# Patient Record
Sex: MALE | Race: WHITE | NOT HISPANIC OR LATINO | Employment: FULL TIME | URBAN - METROPOLITAN AREA
[De-identification: names, ages, dates, MRNs, and addresses within clinical notes are randomized per-mention and may not be internally consistent; named-entity substitution may affect disease eponyms.]

---

## 2017-04-01 ENCOUNTER — APPOINTMENT (OUTPATIENT)
Dept: LAB | Facility: CLINIC | Age: 52
End: 2017-04-01
Payer: COMMERCIAL

## 2017-04-01 DIAGNOSIS — R79.89 OTHER SPECIFIED ABNORMAL FINDINGS OF BLOOD CHEMISTRY: ICD-10-CM

## 2017-04-01 DIAGNOSIS — I51.9 HEART DISEASE: ICD-10-CM

## 2017-04-01 LAB
ANION GAP SERPL CALCULATED.3IONS-SCNC: 7 MMOL/L (ref 4–13)
BUN SERPL-MCNC: 16 MG/DL (ref 5–25)
CALCIUM SERPL-MCNC: 8.8 MG/DL (ref 8.3–10.1)
CHLORIDE SERPL-SCNC: 105 MMOL/L (ref 100–108)
CO2 SERPL-SCNC: 29 MMOL/L (ref 21–32)
CREAT SERPL-MCNC: 1.44 MG/DL (ref 0.6–1.3)
GFR SERPL CREATININE-BSD FRML MDRD: 51.5 ML/MIN/1.73SQ M
GLUCOSE P FAST SERPL-MCNC: 128 MG/DL (ref 65–99)
POTASSIUM SERPL-SCNC: 3.6 MMOL/L (ref 3.5–5.3)
SODIUM SERPL-SCNC: 141 MMOL/L (ref 136–145)

## 2017-04-01 PROCEDURE — 36415 COLL VENOUS BLD VENIPUNCTURE: CPT

## 2017-04-01 PROCEDURE — 80048 BASIC METABOLIC PNL TOTAL CA: CPT

## 2017-04-02 ENCOUNTER — GENERIC CONVERSION - ENCOUNTER (OUTPATIENT)
Dept: OTHER | Facility: OTHER | Age: 52
End: 2017-04-02

## 2017-04-02 DIAGNOSIS — I10 ESSENTIAL (PRIMARY) HYPERTENSION: ICD-10-CM

## 2017-04-02 DIAGNOSIS — Z12.5 ENCOUNTER FOR SCREENING FOR MALIGNANT NEOPLASM OF PROSTATE: ICD-10-CM

## 2017-04-02 DIAGNOSIS — R79.89 OTHER SPECIFIED ABNORMAL FINDINGS OF BLOOD CHEMISTRY: ICD-10-CM

## 2017-04-02 DIAGNOSIS — I51.9 HEART DISEASE: ICD-10-CM

## 2017-04-02 DIAGNOSIS — R53.83 OTHER FATIGUE: ICD-10-CM

## 2017-04-02 DIAGNOSIS — F41.9 ANXIETY DISORDER: ICD-10-CM

## 2017-04-14 ENCOUNTER — APPOINTMENT (OUTPATIENT)
Dept: LAB | Facility: CLINIC | Age: 52
End: 2017-04-14
Payer: COMMERCIAL

## 2017-04-14 DIAGNOSIS — Z12.5 ENCOUNTER FOR SCREENING FOR MALIGNANT NEOPLASM OF PROSTATE: ICD-10-CM

## 2017-04-14 DIAGNOSIS — I51.9 HEART DISEASE: ICD-10-CM

## 2017-04-14 DIAGNOSIS — R53.83 OTHER FATIGUE: ICD-10-CM

## 2017-04-14 DIAGNOSIS — R79.89 OTHER SPECIFIED ABNORMAL FINDINGS OF BLOOD CHEMISTRY: ICD-10-CM

## 2017-04-14 DIAGNOSIS — F41.9 ANXIETY DISORDER: ICD-10-CM

## 2017-04-14 LAB
ALBUMIN SERPL BCP-MCNC: 3.8 G/DL (ref 3.5–5)
ALP SERPL-CCNC: 59 U/L (ref 46–116)
ALT SERPL W P-5'-P-CCNC: 28 U/L (ref 12–78)
ANION GAP SERPL CALCULATED.3IONS-SCNC: 9 MMOL/L (ref 4–13)
AST SERPL W P-5'-P-CCNC: 15 U/L (ref 5–45)
BILIRUB SERPL-MCNC: 0.54 MG/DL (ref 0.2–1)
BUN SERPL-MCNC: 13 MG/DL (ref 5–25)
CALCIUM SERPL-MCNC: 9.2 MG/DL (ref 8.3–10.1)
CHLORIDE SERPL-SCNC: 103 MMOL/L (ref 100–108)
CHOLEST SERPL-MCNC: 155 MG/DL (ref 50–200)
CO2 SERPL-SCNC: 26 MMOL/L (ref 21–32)
CREAT SERPL-MCNC: 1.32 MG/DL (ref 0.6–1.3)
ERYTHROCYTE [DISTWIDTH] IN BLOOD BY AUTOMATED COUNT: 12.9 % (ref 11.6–15.1)
GFR SERPL CREATININE-BSD FRML MDRD: 57 ML/MIN/1.73SQ M
GLUCOSE SERPL-MCNC: 115 MG/DL (ref 65–140)
HCT VFR BLD AUTO: 44.6 % (ref 36.5–49.3)
HDLC SERPL-MCNC: 39 MG/DL (ref 40–60)
HGB BLD-MCNC: 15.7 G/DL (ref 12–17)
LDLC SERPL CALC-MCNC: 75 MG/DL (ref 0–100)
MCH RBC QN AUTO: 30 PG (ref 26.8–34.3)
MCHC RBC AUTO-ENTMCNC: 35.2 G/DL (ref 31.4–37.4)
MCV RBC AUTO: 85 FL (ref 82–98)
PLATELET # BLD AUTO: 217 THOUSANDS/UL (ref 149–390)
PMV BLD AUTO: 10.5 FL (ref 8.9–12.7)
POTASSIUM SERPL-SCNC: 3.8 MMOL/L (ref 3.5–5.3)
PROT SERPL-MCNC: 7.1 G/DL (ref 6.4–8.2)
PSA SERPL-MCNC: 0.6 NG/ML (ref 0–4)
RBC # BLD AUTO: 5.24 MILLION/UL (ref 3.88–5.62)
SODIUM SERPL-SCNC: 138 MMOL/L (ref 136–145)
TRIGL SERPL-MCNC: 206 MG/DL
TSH SERPL DL<=0.05 MIU/L-ACNC: 0.69 UIU/ML (ref 0.36–3.74)
WBC # BLD AUTO: 5.11 THOUSAND/UL (ref 4.31–10.16)

## 2017-04-14 PROCEDURE — 80061 LIPID PANEL: CPT

## 2017-04-14 PROCEDURE — 80053 COMPREHEN METABOLIC PANEL: CPT

## 2017-04-14 PROCEDURE — G0103 PSA SCREENING: HCPCS

## 2017-04-14 PROCEDURE — 84443 ASSAY THYROID STIM HORMONE: CPT

## 2017-04-14 PROCEDURE — 85027 COMPLETE CBC AUTOMATED: CPT

## 2017-04-14 PROCEDURE — 36415 COLL VENOUS BLD VENIPUNCTURE: CPT

## 2017-05-01 ENCOUNTER — ALLSCRIPTS OFFICE VISIT (OUTPATIENT)
Dept: OTHER | Facility: OTHER | Age: 52
End: 2017-05-01

## 2017-05-15 ENCOUNTER — ALLSCRIPTS OFFICE VISIT (OUTPATIENT)
Dept: OTHER | Facility: OTHER | Age: 52
End: 2017-05-15

## 2017-05-22 DIAGNOSIS — R79.89 OTHER SPECIFIED ABNORMAL FINDINGS OF BLOOD CHEMISTRY: ICD-10-CM

## 2017-05-22 DIAGNOSIS — I10 ESSENTIAL (PRIMARY) HYPERTENSION: ICD-10-CM

## 2017-09-11 ENCOUNTER — GENERIC CONVERSION - ENCOUNTER (OUTPATIENT)
Dept: OTHER | Facility: OTHER | Age: 52
End: 2017-09-11

## 2017-10-06 ENCOUNTER — APPOINTMENT (OUTPATIENT)
Dept: LAB | Facility: CLINIC | Age: 52
End: 2017-10-06
Payer: COMMERCIAL

## 2017-10-06 DIAGNOSIS — Z00.00 ENCOUNTER FOR GENERAL ADULT MEDICAL EXAMINATION WITHOUT ABNORMAL FINDINGS: ICD-10-CM

## 2017-10-06 DIAGNOSIS — I10 ESSENTIAL (PRIMARY) HYPERTENSION: ICD-10-CM

## 2017-10-06 DIAGNOSIS — E78.1 PURE HYPERGLYCERIDEMIA: ICD-10-CM

## 2017-10-06 LAB
ALBUMIN SERPL BCP-MCNC: 3.8 G/DL (ref 3.5–5)
ALP SERPL-CCNC: 58 U/L (ref 46–116)
ALT SERPL W P-5'-P-CCNC: 25 U/L (ref 12–78)
ANION GAP SERPL CALCULATED.3IONS-SCNC: 8 MMOL/L (ref 4–13)
AST SERPL W P-5'-P-CCNC: 14 U/L (ref 5–45)
BILIRUB SERPL-MCNC: 0.36 MG/DL (ref 0.2–1)
BUN SERPL-MCNC: 14 MG/DL (ref 5–25)
CALCIUM SERPL-MCNC: 8.5 MG/DL (ref 8.3–10.1)
CHLORIDE SERPL-SCNC: 106 MMOL/L (ref 100–108)
CHOLEST SERPL-MCNC: 134 MG/DL (ref 50–200)
CO2 SERPL-SCNC: 26 MMOL/L (ref 21–32)
CREAT SERPL-MCNC: 1.31 MG/DL (ref 0.6–1.3)
GFR SERPL CREATININE-BSD FRML MDRD: 62 ML/MIN/1.73SQ M
GLUCOSE P FAST SERPL-MCNC: 95 MG/DL (ref 65–99)
HDLC SERPL-MCNC: 38 MG/DL (ref 40–60)
LDLC SERPL CALC-MCNC: 71 MG/DL (ref 0–100)
POTASSIUM SERPL-SCNC: 3.5 MMOL/L (ref 3.5–5.3)
PROT SERPL-MCNC: 7.3 G/DL (ref 6.4–8.2)
SODIUM SERPL-SCNC: 140 MMOL/L (ref 136–145)
TRIGL SERPL-MCNC: 123 MG/DL

## 2017-10-06 PROCEDURE — 36415 COLL VENOUS BLD VENIPUNCTURE: CPT

## 2017-10-06 PROCEDURE — 80061 LIPID PANEL: CPT

## 2017-10-06 PROCEDURE — 80053 COMPREHEN METABOLIC PANEL: CPT

## 2017-10-12 DIAGNOSIS — Z00.00 ENCOUNTER FOR GENERAL ADULT MEDICAL EXAMINATION WITHOUT ABNORMAL FINDINGS: ICD-10-CM

## 2017-10-12 DIAGNOSIS — I10 ESSENTIAL (PRIMARY) HYPERTENSION: ICD-10-CM

## 2017-10-12 DIAGNOSIS — E78.1 PURE HYPERGLYCERIDEMIA: ICD-10-CM

## 2017-12-15 ENCOUNTER — ALLSCRIPTS OFFICE VISIT (OUTPATIENT)
Dept: OTHER | Facility: OTHER | Age: 52
End: 2017-12-15

## 2017-12-16 NOTE — PROGRESS NOTES
Assessment  1  Benign essential hypertension (401 1) (I10)   2  Generalized anxiety disorder (300 02) (F41 1)   3  Acid reflux disease (530 81) (K21 9)    Plan  Acid reflux disease    · Pantoprazole Sodium 40 MG Oral Tablet Delayed Release; TAKE 1 TABLET 30MINUTES BEFORE BREAKFAST DAILY PRN : acid reflux  Benign essential hypertension    · Bystolic 5 MG Oral Tablet; take 1 tablet every day   · EKG/ECG- POC; Status:Complete;   Done: [id number] 12:03PM    Discussion/Summary    Patient presents for re-evaluation of hypertension, generalized anxiety disorder and symptoms of acid reflux  EKG performed in the office today is normal  Blood pressure is elevated  He appears bit anxious and stressed out  Will continue on amlodipine 5 mg once a day and add Bystolic 5 mg daily  I advised patient to follow bland diet  Will try him on pantoprazole for symptoms of acid reflux  He may use medication on a as needed basis  I advised patient to monitor blood pressures at home  He will contact me in a few weeks with an update  The patient was counseled regarding instructions for management,-- impressions  Chief Complaint  Patient is here c/o elevated blood pressure and acid reflux x's 1 day  All medications were reviewed and updated with the patient  History of Present Illness  HPI: acid reflux- burning in chest all day Wednesday - through Thursday - improved nowPt used TUMsB/P was high yesterday,  systolic / yesterday - B/P has improved after resting SBP of 155 felt hot yesterdayface was red  yesterday B/P this am at home 155/89Patient denies chest pain, palpitations or dizziness  He admits being under lot of stress within past few weeksHe uses Lexapro daily along with Norvasc 5 mg once a dayHe tolerates current medication regimen well  We tried him on metoprolol in the past and cause significant fatigue and sexual side effects  Patient admits recurrent acid reflux symptoms      Review of Systems   Constitutional: no fever or chills, feels well, no tiredness, no recent weight loss or weight gain  ENT: no complaints of earache, no loss of hearing, no nosebleeds or nasal discharge, no sore throat or hoarseness  Cardiovascular: no complaints of slow or fast heart rate, no chest pain, no palpitations, no leg claudication or lower extremity edema  Respiratory: no complaints of shortness of breath, no wheezing or cough, no dyspnea on exertion, no orthopnea or PND  Gastrointestinal: as noted in HPI  Genitourinary: no complaints of dysuria or incontinence, no hesitancy, no nocturia, no genital lesion, no inadequacy of penile erection  Musculoskeletal: no complaints of arthralgia, no myalgia, no joint swelling or stiffness, no limb pain or swelling  Integumentary: no complaints of skin rash or lesion, no itching or dry skin, no skin wounds  Neurological: no complaints of headache, no confusion, no numbness or tingling, no dizziness or fainting  Other Symptoms: Anxiety, stress, emotional problems  Active Problems  1  Abnormal TSH (790 6) (R94 6)   2  Allergic rhinitis (477 9) (J30 9)   3  Anxiety (300 00) (F41 9)   4  Asthma (493 90) (J45 909)   5  Benign essential hypertension (401 1) (I10)   6  Chest pain (786 50) (R07 9)   7  Diastolic dysfunction (604 2) (I51 9)   8  Elevated serum creatinine (790 99) (R79 89)   9  Encounter for prostate cancer screening (V76 44) (Z12 5)   10  Fatigue (780 79) (R53 83)   11  Generalized anxiety disorder (300 02) (F41 1)   12  Hypertriglyceridemia (272 1) (E78 1)   13  Insomnia (780 52) (G47 00)   14  Need for influenza vaccination (V04 81) (Z23)   15  Palpitations (785 1) (R00 2)    Past Medical History  Active Problems And Past Medical History Reviewed: The active problems and past medical history were reviewed and updated today  Family History  Father    1  Family history of Diabetes Mellitus (V18 0)   2  Family history of Hypertension (V17 49)  Sister    3   Family history of Breast Cancer (V16 3)  Family History Reviewed: The family history was reviewed and updated today  Social History   · Never A Smoker  The social history was reviewed and updated today  Surgical History  1  History of Surgery Vas Deferens Vasectomy  Surgical History Reviewed: The surgical history was reviewed and updated today  Current Meds   1  AmLODIPine Besylate 5 MG Oral Tablet; Take 1 tablet daily; Therapy: [id number] to (Evaluate:33Bvk1326)  Requested for: 47YAA5093; Last Rx:[id number] Ordered   2  Escitalopram Oxalate 20 MG Oral Tablet; take 1 tablet by mouth every day; Therapy: 05MTG6263 to (Evaluate:13Oct2018)  Requested for: 63YAJ8634; Last Rx:[id number] Ordered    The medication list was reviewed and updated today  Allergies  1  No Known Drug Allergies    Vitals   Recorded: 06PRP2780 11:57AM Recorded: 98XED9128 11:43AM   Temperature  98 2 F   Heart Rate  64   Respiration  16   Systolic 841 707   Diastolic 88 72   Height  5 ft 8 in   Weight  214 lb 4 oz   BMI Calculated  32 58   BSA Calculated  2 1     Physical Exam   Constitutional  General appearance: No acute distress, well appearing and well nourished  Pulmonary  Respiratory effort: No increased work of breathing or signs of respiratory distress  Auscultation of lungs: Clear to auscultation, equal breath sounds bilaterally, no wheezes, no rales, no rhonci  Cardiovascular  Auscultation of heart: Normal rate and rhythm, normal S1 and S2, without murmurs  Examination of extremities for edema and/or varicosities: Normal    Carotid pulses: Normal    Abdomen  Abdomen: Non-tender, no masses  -- Mild epigastric tenderness, no rebound no guarding; no abdominal bruit  Liver and spleen: No hepatomegaly or splenomegaly  Musculoskeletal  Gait and station: Normal    Neurologic  Cranial nerves: Cranial nerves 2-12 intact     Psychiatric  Orientation to person, place and time: Normal    Mood and affect: Normal          Results/Data  EKG/ECG- [facility] [id number] 12:03PM Nicole Tristan     Test Name Result Flag Reference   EKG/ECG 12/15/2017       A 12 lead ECG was performed and was normal -- No acute ischemia  Rhythm and rate: normal sinus rhythm  Signatures   Electronically signed by :  BLU Cabrera ; Dec 15 2017  7:32PM EST                       (Author)

## 2018-01-14 VITALS
WEIGHT: 212.44 LBS | HEART RATE: 62 BPM | HEIGHT: 68 IN | SYSTOLIC BLOOD PRESSURE: 140 MMHG | DIASTOLIC BLOOD PRESSURE: 100 MMHG | BODY MASS INDEX: 32.2 KG/M2

## 2018-01-14 VITALS
HEART RATE: 86 BPM | SYSTOLIC BLOOD PRESSURE: 148 MMHG | DIASTOLIC BLOOD PRESSURE: 80 MMHG | BODY MASS INDEX: 32.15 KG/M2 | HEIGHT: 68 IN | WEIGHT: 212.13 LBS | TEMPERATURE: 98.3 F

## 2018-01-14 NOTE — RESULT NOTES
Message   Please contact patient  I received his blood work  His kidney function is still decreased  At this time I advised him to discontinue Cozaar ( Losartan)  Please make sure that he is drinking plenty of fluids  He needs repeat complete panel blood work in 1-2 weeks and schedule checkup with me then  Verified Results  (1) BASIC METABOLIC PROFILE [id number] 10:07AM Kristy Banuelos Order Number: [id number]     Test Name Result Flag Reference   SODIUM 141 mmol/L  136-145   POTASSIUM 3 6 mmol/L  3 5-5 3   CHLORIDE 105 mmol/L  100-108   CARBON DIOXIDE 29 mmol/L  21-32   ANION GAP (CALC) 7 mmol/L  4-13   BLOOD UREA NITROGEN 16 mg/dL  5-25   CREATININE 1 44 mg/dL H 0 60-1 30   Standardized to IDMS reference method   CALCIUM 8 8 mg/dL  8 3-10 1   eGFR Non-African American 51 5 ml/min/1 73sq m     National Kidney Disease Education Program recommendations are as follows:  GFR calculation is accurate only with a steady state creatinine  Chronic Kidney disease less than 60 ml/min/1 73 sq  meters  Kidney failure less than 15 ml/min/1 73 sq  meters  GLUCOSE FASTING 128 mg/dL H 65-99       Plan  Diastolic dysfunction, Fatigue, Palpitations    · Losartan Potassium 25 MG Oral Tablet (Cozaar)    Signatures   Electronically signed by :  BLU Milan ; Apr 2 2017  2:01PM EST                       (Author)

## 2018-01-16 NOTE — RESULT NOTES
Verified Results  (1) CBC/PLT/DIFF [id number] 12:27PM Beatriz Prader     Test Name Result Flag Reference   WHITE BLOOD CELL COUNT 4 1 Thousand/uL  3 8-10 8   RED BLOOD CELL COUNT 5 08 Million/uL  4 20-5 80   HEMOGLOBIN 15 1 g/dL  13 2-17 1   HEMATOCRIT 44 4 %  38 5-50 0   MCV 87 3 fL  80 0-100 0   MCH 29 7 pg  27 0-33 0   MCHC 34 0 g/dL  32 0-36 0   RDW 13 3 %  11 0-15 0   PLATELET COUNT 224 Thousand/uL  140-400   MPV 8 4 fL  7 5-11 5   ABSOLUTE NEUTROPHILS 2530 cells/uL  8178-4688   ABSOLUTE LYMPHOCYTES 951 cells/uL  850-3900   ABSOLUTE MONOCYTES 390 cells/uL  200-950   ABSOLUTE EOSINOPHILS 201 cells/uL     ABSOLUTE BASOPHILS 29 cells/uL  0-200   NEUTROPHILS 61 7 %     LYMPHOCYTES 23 2 %     MONOCYTES 9 5 %     EOSINOPHILS 4 9 %     BASOPHILS 0 7 %       (1) COMPREHENSIVE METABOLIC PANEL [id number] 48:29XP Melony Prader     Test Name Result Flag Reference   GLUCOSE 106 mg/dL H 65-99   Fasting reference interval   UREA NITROGEN (BUN) 15 mg/dL  7-25   CREATININE 1 32 mg/dL  0 70-1 33   For patients >52years of age, the reference limit  for Creatinine is approximately 13% higher for people  identified as -American  eGFR NON-AFR   AMERICAN 62 mL/min/1 73m2  > OR = 60   eGFR AFRICAN AMERICAN 72 mL/min/1 73m2  > OR = 60   BUN/CREATININE RATIO   3-69   NOT APPLICABLE (calc)   SODIUM 139 mmol/L  135-146   POTASSIUM 3 8 mmol/L  3 5-5 3   CHLORIDE 101 mmol/L     CARBON DIOXIDE 28 mmol/L  19-30   CALCIUM 8 9 mg/dL  8 6-10 3   PROTEIN, TOTAL 6 8 g/dL  6 1-8 1   ALBUMIN 4 5 g/dL  3 6-5 1   GLOBULIN 2 3 g/dL (calc)  1 9-3 7   ALBUMIN/GLOBULIN RATIO 2 0 (calc)  1 0-2 5   BILIRUBIN, TOTAL 0 6 mg/dL  0 2-1 2   ALKALINE PHOSPHATASE 44 U/L     AST 18 U/L  10-35   ALT 22 U/L  9-46     (Q) LIPID PANEL WITH REFLEX TO DIRECT LDL [id number] 12:27PM Melony Prader     Test Name Result Flag Reference   CHOLESTEROL, TOTAL 181 mg/dL  125-200   HDL CHOLESTEROL 40 mg/dL  > OR = 40   TRIGLICERIDES 438 mg/dL H <150   LDL-CHOLESTEROL 111 mg/dL (calc)  <130   Desirable range <100 mg/dL for patients with CHD or  diabetes and <70 mg/dL for diabetic patients with  known heart disease  CHOL/HDLC RATIO 4 5 (calc)  < OR = 5 0   NON HDL CHOLESTEROL 141 mg/dL (calc)     Target for non-HDL cholesterol is 30 mg/dL higher than   LDL cholesterol target  (1) PSA, DIAGNOSTIC (FOLLOW-UP) [id number] 12:27PM Dakota Lester     Test Name Result Flag Reference   PSA, TOTAL 0 6 ng/mL  < OR = 4 0   This test was performed using the Siemens  chemiluminescent method  Values obtained from  different assay methods cannot be used  interchangeably  PSA levels, regardless of  value, should not be interpreted as absolute  evidence of the presence or absence of disease  (Q) TSH, 3RD GENERATION W/REFLEX TO FT4 [id number] 12:27PM Dakota Lester   REPORT COMMENT:  FASTING:YES  AN UPDATE OR CORRECTION HAS BEEN MADE TO      Test Name Result Flag Reference   TSH W/REFLEX TO FT4 0 51 mIU/L  0 40-4 50       Discussion/Summary   Dear Denise Chicas,      All blood work is normal    If you have any questions or concerns please contact our office  Thank you      Signatures   Electronically signed by :  BLU Gonzalez ; Deo 10 2016  2:51PM EST                       (Author)

## 2018-01-18 ENCOUNTER — GENERIC CONVERSION - ENCOUNTER (OUTPATIENT)
Dept: OTHER | Facility: OTHER | Age: 53
End: 2018-01-18

## 2018-01-22 VITALS
WEIGHT: 309.25 LBS | BODY MASS INDEX: 46.87 KG/M2 | SYSTOLIC BLOOD PRESSURE: 130 MMHG | HEIGHT: 68 IN | DIASTOLIC BLOOD PRESSURE: 80 MMHG | RESPIRATION RATE: 16 BRPM | TEMPERATURE: 97.6 F | HEART RATE: 64 BPM

## 2018-01-23 VITALS
WEIGHT: 214.25 LBS | HEIGHT: 68 IN | HEART RATE: 64 BPM | SYSTOLIC BLOOD PRESSURE: 142 MMHG | RESPIRATION RATE: 16 BRPM | DIASTOLIC BLOOD PRESSURE: 88 MMHG | TEMPERATURE: 98.2 F | BODY MASS INDEX: 32.47 KG/M2

## 2018-01-23 NOTE — MISCELLANEOUS
Message   Recorded as Task   Date: 01/18/2018 09:38 AM, Created By: Des Edward   Task Name: Medical Complaint Callback   Assigned To: Ingrid Stevens   Regarding Patient: Yamila Uribe, Status: Active   Comment:    Darcy Reyez - 18 Jan 2018 9:38 AM     TASK CREATED    FYI:  Spoke with pt, he is out of town  He is reporting that his abdomen is extremely bloated and his lower extremities are edematous--he is unable to define his ankles  I advised him it was imperative to proceed to the nearest [facility] for evaluation ASAP  I spelled each of his medications he was taking for him to write down to take the Urgent Care and advised him if the [address] [place] [address] physician required and further information to contact our office--explained that there is always an on call physician here to speak with  Ingrid Stevens - 18 Jan 2018 12:50 PM     TASK REPLIED TO: Previously Assigned To Ingrid Stevens               agree, thank you        Signatures   Electronically signed by :  BLU Caballero ; Jan 18 2018 12:50PM EST                       (Author)

## 2018-01-29 RX ORDER — PANTOPRAZOLE SODIUM 40 MG/1
40 TABLET, DELAYED RELEASE ORAL DAILY
COMMUNITY
Start: 2017-12-15 | End: 2018-03-10 | Stop reason: SDUPTHER

## 2018-01-29 RX ORDER — ESCITALOPRAM OXALATE 20 MG/1
1 TABLET ORAL DAILY
COMMUNITY
Start: 2012-07-24 | End: 2018-09-08 | Stop reason: SDUPTHER

## 2018-01-29 RX ORDER — AMLODIPINE BESYLATE 5 MG/1
1 TABLET ORAL DAILY
COMMUNITY
Start: 2017-09-11 | End: 2018-04-03 | Stop reason: SDUPTHER

## 2018-01-29 RX ORDER — NEBIVOLOL 5 MG/1
1 TABLET ORAL DAILY
COMMUNITY
Start: 2017-12-15 | End: 2018-02-05 | Stop reason: SDUPTHER

## 2018-01-30 ENCOUNTER — OFFICE VISIT (OUTPATIENT)
Dept: FAMILY MEDICINE CLINIC | Facility: CLINIC | Age: 53
End: 2018-01-30
Payer: COMMERCIAL

## 2018-01-30 VITALS
DIASTOLIC BLOOD PRESSURE: 90 MMHG | SYSTOLIC BLOOD PRESSURE: 144 MMHG | HEART RATE: 84 BPM | WEIGHT: 220 LBS | TEMPERATURE: 97.8 F | BODY MASS INDEX: 33.34 KG/M2 | HEIGHT: 68 IN

## 2018-01-30 DIAGNOSIS — I10 BENIGN ESSENTIAL HTN: Primary | ICD-10-CM

## 2018-01-30 PROBLEM — K21.9 ACID REFLUX DISEASE: Status: ACTIVE | Noted: 2017-12-15

## 2018-01-30 PROCEDURE — 99213 OFFICE O/P EST LOW 20 MIN: CPT | Performed by: FAMILY MEDICINE

## 2018-01-30 NOTE — PROGRESS NOTES
FAMILY PRACTICE OFFICE VISIT       NAME: Dick Daley  AGE: 47 y o  SEX: male       : 1965        MRN: [id number]    DATE: 2018  TIME: 9:01 AM    Assessment and Plan     Problem List Items Addressed This Visit     Benign essential HTN - Primary    Relevant Medications    nebivolol (BYSTOLIC) 5 mg tablet    amLODIPine (NORVASC) 5 mg tablet        Patient presents for follow-up  He was evaluated at [place] urgent care [facility] after developing transient leg edema, could be related to travel and use of amlodipine  He has not been taking Bystolic for the last 2 weeks  His blood pressure is elevated today and I advised him to restart his beta-blocker as directed and update me with his blood pressures in 2 weeks  Will schedule follow-up in 1 month  He will continue on regimen of Protonix and Lexapro for chronic medical conditions otherwise  There are no Patient Instructions on file for this visit  Chief Complaint     Chief Complaint   Patient presents with    Follow-up     bilateral leg swelling approx 2 weeks ago  Pt was seen at the hospital in [facility]  History of Present Illness     Patient presents for follow-up  He was traveling in [place] 2 weeks ago and has developed leg swelling  Patient was evaluated at urgent care center and was evaluated  His blood pressure was 144/84 EKG performed at urgent care center revealed no acute ischemic changes with a heart rate of 60 beats per minute  Patient self discontinued Bystolic since he was concerned that this new medication is contributing to his leg swelling  His leg swelling has resolved within few days, he is not on any diuretics  He denies any symptoms of chest pain, palpitations, dyspnea or leg edema at present time  He remains on regimen of amlodipine and Protonix  Acid reflux symptoms are well controlled  He has not restarted Bystolic so far  Review of Systems   Review of Systems   Constitutional: Negative  HENT: Negative  Respiratory: Negative  Cardiovascular: Negative  Gastrointestinal: Negative  Musculoskeletal: Negative  Neurological: Negative  Psychiatric/Behavioral: Negative  Active Problem List     Patient Active Problem List   Diagnosis    Acid reflux disease    Asthma    Benign essential HTN       Past Medical History:  No past medical history on file  Past Surgical History:  Past Surgical History:   Procedure Laterality Date    VASECTOMY      Surgery Vas Deferens Vasectomy       Family History:  Family History   Problem Relation Age of Onset    Diabetes Father      Diabetes Mellitus    Hypertension Father     Breast cancer Sister        Social History:  Social History     Social History    Marital status: /Civil Union     Spouse name: N/A    Number of children: N/A    Years of education: N/A     Occupational History    Not on file  Social History Main Topics    Smoking status: Never Smoker    Smokeless tobacco: Never Used    Alcohol use Not on file      Comment: social    Drug use: Unknown    Sexual activity: Not on file     Other Topics Concern    Not on file     Social History Narrative    No narrative on file       I have reviewed the patient's medical history in detail; there are no changes to the history as noted in the electronic medical record  Objective     Vitals:    01/30/18 0855   BP: 144/90   Pulse:    Temp:      Vitals:    01/30/18 0807 01/30/18 0855   BP: 158/94 144/90   Pulse: 84    Temp: 97 8 °F (36 6 °C)    Weight: 99 8 kg (220 lb)    Height: 5' 8" (1 727 m)      Wt Readings from Last 3 Encounters:   01/30/18 99 8 kg (220 lb)   12/15/17 97 2 kg (214 lb 4 oz)   09/11/17 (!) 140 kg (309 lb 4 oz)       Physical Exam   Constitutional: He is oriented to person, place, and time  He appears well-developed and well-nourished  HENT:   Head: Normocephalic and atraumatic  Neck: Neck supple     Cardiovascular: Normal rate, regular rhythm and normal heart sounds  No murmur heard  No leg edema   Pulmonary/Chest: Effort normal and breath sounds normal    Musculoskeletal: Normal range of motion  Neurological: He is alert and oriented to person, place, and time  Psychiatric: He has a normal mood and affect  His behavior is normal  Thought content normal    Nursing note and vitals reviewed        Pertinent Laboratory/Diagnostic Studies:  Lab Results   Component Value Date    GLUCOSE 115 04/14/2017    BUN 14 10/06/2017    CREATININE 1 31 (H) 10/06/2017    CALCIUM 8 5 10/06/2017     10/06/2017    K 3 5 10/06/2017    CO2 26 10/06/2017     10/06/2017     Lab Results   Component Value Date    ALT 25 10/06/2017    AST 14 10/06/2017    ALKPHOS 58 10/06/2017    BILITOT 0 36 10/06/2017       Lab Results   Component Value Date    WBC 5 11 04/14/2017    HGB 15 7 04/14/2017    HCT 44 6 04/14/2017    MCV 85 04/14/2017     04/14/2017       No results found for: TSH    Lab Results   Component Value Date    CHOL 134 10/06/2017     Lab Results   Component Value Date    TRIG 123 10/06/2017     Lab Results   Component Value Date    HDL 38 (L) 10/06/2017     Lab Results   Component Value Date    LDLCALC 71 10/06/2017     No results found for: HGBA1C    Results for orders placed or performed in visit on 10/06/17   Comprehensive metabolic panel   Result Value Ref Range    Sodium 140 136 - 145 mmol/L    Potassium 3 5 3 5 - 5 3 mmol/L    Chloride 106 100 - 108 mmol/L    CO2 26 21 - 32 mmol/L    Anion Gap 8 4 - 13 mmol/L    BUN 14 5 - 25 mg/dL    Creatinine 1 31 (H) 0 60 - 1 30 mg/dL    Glucose, Fasting 95 65 - 99 mg/dL    Calcium 8 5 8 3 - 10 1 mg/dL    AST 14 5 - 45 U/L    ALT 25 12 - 78 U/L    Alkaline Phosphatase 58 46 - 116 U/L    Total Protein 7 3 6 4 - 8 2 g/dL    Albumin 3 8 3 5 - 5 0 g/dL    Total Bilirubin 0 36 0 20 - 1 00 mg/dL    eGFR 62 ml/min/1 73sq m   Lipid Panel with Direct LDL reflex   Result Value Ref Range    Cholesterol 134 50 - 200 mg/dL    Triglycerides 123 <=150 mg/dL    HDL, Direct 38 (L) 40 - 60 mg/dL    LDL Calculated 71 0 - 100 mg/dL       No orders of the defined types were placed in this encounter  ALLERGIES:  Allergies no known allergies    Current Medications     Current Outpatient Prescriptions   Medication Sig Dispense Refill    amLODIPine (NORVASC) 5 mg tablet Take 1 tablet by mouth daily      escitalopram (LEXAPRO) 20 mg tablet Take 1 tablet by mouth daily      pantoprazole (PROTONIX) 40 mg tablet Take 40 mg by mouth daily      nebivolol (BYSTOLIC) 5 mg tablet Take 1 tablet by mouth daily       No current facility-administered medications for this visit  Health Maintenance   There are no preventive care reminders to display for this patient    Immunization History   Administered Date(s) Administered    Influenza Quadrivalent Preservative Free 3 years and older IM 11/27/2015    Influenza TIV (IM) 11/29/2005       Rasheeda Wong MD

## 2018-01-30 NOTE — PROGRESS NOTES
FAMILY PRACTICE OFFICE VISIT       NAME: Uche Armetna  AGE: 47 y o  SEX: male       : 1965        MRN: [id number]    DATE: 2018  TIME: 8:56 AM    Assessment and Plan     Problem List Items Addressed This Visit     None            There are no Patient Instructions on file for this visit  Chief Complaint     Chief Complaint   Patient presents with    Follow-up     bilateral leg swelling approx 2 weeks ago  Pt was seen at the hospital in [facility]  History of Present Illness     HPI    Review of Systems   Review of Systems    Active Problem List   There is no problem list on file for this patient  Past Medical History:  No past medical history on file  Past Surgical History:  Past Surgical History:   Procedure Laterality Date    VASECTOMY      Surgery Vas Deferens Vasectomy       Family History:  Family History   Problem Relation Age of Onset    Diabetes Father      Diabetes Mellitus    Hypertension Father     Breast cancer Sister        Social History:  Social History     Social History    Marital status: /Civil Union     Spouse name: N/A    Number of children: N/A    Years of education: N/A     Occupational History    Not on file  Social History Main Topics    Smoking status: Never Smoker    Smokeless tobacco: Never Used    Alcohol use Not on file      Comment: social    Drug use: Unknown    Sexual activity: Not on file     Other Topics Concern    Not on file     Social History Narrative    No narrative on file       I have reviewed the patient's medical history in detail; there are no changes to the history as noted in the electronic medical record      Objective     Vitals:    18 0855   BP: 144/90   Pulse:    Temp:      Wt Readings from Last 3 Encounters:   18 99 8 kg (220 lb)   12/15/17 97 2 kg (214 lb 4 oz)   17 (!) 140 kg (309 lb 4 oz)       Physical Exam    Pertinent Laboratory/Diagnostic Studies:  Lab Results   Component Value Date    GLUCOSE 115 04/14/2017    BUN 14 10/06/2017    CREATININE 1 31 (H) 10/06/2017    CALCIUM 8 5 10/06/2017     10/06/2017    K 3 5 10/06/2017    CO2 26 10/06/2017     10/06/2017     Lab Results   Component Value Date    ALT 25 10/06/2017    AST 14 10/06/2017    ALKPHOS 58 10/06/2017    BILITOT 0 36 10/06/2017       Lab Results   Component Value Date    WBC 5 11 04/14/2017    HGB 15 7 04/14/2017    HCT 44 6 04/14/2017    MCV 85 04/14/2017     04/14/2017       No results found for: TSH    Lab Results   Component Value Date    CHOL 134 10/06/2017     Lab Results   Component Value Date    TRIG 123 10/06/2017     Lab Results   Component Value Date    HDL 38 (L) 10/06/2017     Lab Results   Component Value Date    LDLCALC 71 10/06/2017     No results found for: HGBA1C    Results for orders placed or performed in visit on 10/06/17   Comprehensive metabolic panel   Result Value Ref Range    Sodium 140 136 - 145 mmol/L    Potassium 3 5 3 5 - 5 3 mmol/L    Chloride 106 100 - 108 mmol/L    CO2 26 21 - 32 mmol/L    Anion Gap 8 4 - 13 mmol/L    BUN 14 5 - 25 mg/dL    Creatinine 1 31 (H) 0 60 - 1 30 mg/dL    Glucose, Fasting 95 65 - 99 mg/dL    Calcium 8 5 8 3 - 10 1 mg/dL    AST 14 5 - 45 U/L    ALT 25 12 - 78 U/L    Alkaline Phosphatase 58 46 - 116 U/L    Total Protein 7 3 6 4 - 8 2 g/dL    Albumin 3 8 3 5 - 5 0 g/dL    Total Bilirubin 0 36 0 20 - 1 00 mg/dL    eGFR 62 ml/min/1 73sq m   Lipid Panel with Direct LDL reflex   Result Value Ref Range    Cholesterol 134 50 - 200 mg/dL    Triglycerides 123 <=150 mg/dL    HDL, Direct 38 (L) 40 - 60 mg/dL    LDL Calculated 71 0 - 100 mg/dL       No orders of the defined types were placed in this encounter        ALLERGIES:  Allergies no known allergies    Current Medications     Current Outpatient Prescriptions   Medication Sig Dispense Refill    amLODIPine (NORVASC) 5 mg tablet Take 1 tablet by mouth daily      escitalopram (LEXAPRO) 20 mg tablet Take 1 tablet by mouth daily      pantoprazole (PROTONIX) 40 mg tablet Take 40 mg by mouth daily      nebivolol (BYSTOLIC) 5 mg tablet Take 1 tablet by mouth daily       No current facility-administered medications for this visit  Health Maintenance   There are no preventive care reminders to display for this patient    Immunization History   Administered Date(s) Administered    Influenza Quadrivalent Preservative Free 3 years and older IM 11/27/2015    Influenza TIV (IM) 11/29/2005       Hernán Rod MD

## 2018-02-05 ENCOUNTER — TELEPHONE (OUTPATIENT)
Dept: FAMILY MEDICINE CLINIC | Facility: CLINIC | Age: 53
End: 2018-02-05

## 2018-02-05 DIAGNOSIS — I10 ESSENTIAL HYPERTENSION: Primary | ICD-10-CM

## 2018-02-05 RX ORDER — NEBIVOLOL 5 MG/1
5 TABLET ORAL DAILY
Qty: 90 TABLET | Refills: 1 | Status: SHIPPED | OUTPATIENT
Start: 2018-02-05 | End: 2018-07-25 | Stop reason: SDUPTHER

## 2018-02-05 NOTE — TELEPHONE ENCOUNTER
Patient called to advise he is almost out of the Bostolic samples & would like to know if you can call in a script to [facility] in [place] for him

## 2018-02-19 ENCOUNTER — OFFICE VISIT (OUTPATIENT)
Dept: FAMILY MEDICINE CLINIC | Facility: CLINIC | Age: 53
End: 2018-02-19
Payer: COMMERCIAL

## 2018-02-19 VITALS
HEIGHT: 68 IN | SYSTOLIC BLOOD PRESSURE: 118 MMHG | BODY MASS INDEX: 33.01 KG/M2 | DIASTOLIC BLOOD PRESSURE: 80 MMHG | TEMPERATURE: 98.1 F | RESPIRATION RATE: 16 BRPM | HEART RATE: 64 BPM | WEIGHT: 217.8 LBS

## 2018-02-19 DIAGNOSIS — I10 BENIGN ESSENTIAL HTN: Primary | ICD-10-CM

## 2018-02-19 DIAGNOSIS — Z12.5 ENCOUNTER FOR PROSTATE CANCER SCREENING: ICD-10-CM

## 2018-02-19 PROCEDURE — 99212 OFFICE O/P EST SF 10 MIN: CPT | Performed by: FAMILY MEDICINE

## 2018-02-19 NOTE — PROGRESS NOTES
FAMILY PRACTICE OFFICE VISIT       NAME: Laverne Benton  AGE: 47 y o  SEX: male       : 1965        MRN: [id number]    DATE: 2018  TIME: 7:37 PM    Assessment and Plan     Problem List Items Addressed This Visit     Benign essential HTN - Primary        Patient presents for follow-up  Hypertension-well controlled, continue same medications including Norvasc and Bystolic  Patient feels well and offers no complaints  He is motivated to start with exercise routine and lose weight  Will repeat blood work in 3 months and schedule annual wellness exam then  There are no Patient Instructions on file for this visit  Chief Complaint     Chief Complaint   Patient presents with    Follow-up       History of Present Illness     Patient presents for follow-up of hypertension  He remains on regimen of Norvasc and Bystolic  He tolerates both medications well without side effects  No recurrences of leg edema  He uses Lexapro and Protonix as directed as well  His motivated to start healthy diet and exercise routine  He offers no complaints today        Review of Systems   Review of Systems   All other systems reviewed and are negative  Active Problem List     Patient Active Problem List   Diagnosis    Acid reflux disease    Asthma    Benign essential HTN       Past Medical History:  History reviewed  No pertinent past medical history  Past Surgical History:  Past Surgical History:   Procedure Laterality Date    VASECTOMY      Surgery Vas Deferens Vasectomy       Family History:  Family History   Problem Relation Age of Onset    Diabetes Father      Diabetes Mellitus    Hypertension Father     Breast cancer Sister        Social History:  Social History     Social History    Marital status: /Civil Union     Spouse name: N/A    Number of children: N/A    Years of education: N/A     Occupational History    Not on file       Social History Main Topics    Smoking status: Never Smoker    Smokeless tobacco: Never Used    Alcohol use Not on file      Comment: social    Drug use: Unknown    Sexual activity: Not on file     Other Topics Concern    Not on file     Social History Narrative    No narrative on file       Objective     Vitals:    02/19/18 1107   BP: 118/80   Pulse: 64   Resp: 16   Temp: 98 1 °F (36 7 °C)     Wt Readings from Last 3 Encounters:   02/19/18 98 8 kg (217 lb 12 8 oz)   01/30/18 99 8 kg (220 lb)   12/15/17 97 2 kg (214 lb 4 oz)       Physical Exam   Constitutional: He appears well-developed and well-nourished  Neck: Neck supple  Cardiovascular: Normal rate, regular rhythm and normal heart sounds  Neurological: He is alert  Psychiatric: He has a normal mood and affect  Nursing note and vitals reviewed        Pertinent Laboratory/Diagnostic Studies:  Lab Results   Component Value Date    GLUCOSE 115 04/14/2017    BUN 14 10/06/2017    CREATININE 1 31 (H) 10/06/2017    CALCIUM 8 5 10/06/2017     10/06/2017    K 3 5 10/06/2017    CO2 26 10/06/2017     10/06/2017     Lab Results   Component Value Date    ALT 25 10/06/2017    AST 14 10/06/2017    ALKPHOS 58 10/06/2017    BILITOT 0 36 10/06/2017       Lab Results   Component Value Date    WBC 5 11 04/14/2017    HGB 15 7 04/14/2017    HCT 44 6 04/14/2017    MCV 85 04/14/2017     04/14/2017       No results found for: TSH    Lab Results   Component Value Date    CHOL 134 10/06/2017     Lab Results   Component Value Date    TRIG 123 10/06/2017     Lab Results   Component Value Date    HDL 38 (L) 10/06/2017     Lab Results   Component Value Date    LDLCALC 71 10/06/2017     No results found for: HGBA1C    Results for orders placed or performed in visit on 10/06/17   Comprehensive metabolic panel   Result Value Ref Range    Sodium 140 136 - 145 mmol/L    Potassium 3 5 3 5 - 5 3 mmol/L    Chloride 106 100 - 108 mmol/L    CO2 26 21 - 32 mmol/L    Anion Gap 8 4 - 13 mmol/L    BUN 14 5 - 25 mg/dL Creatinine 1 31 (H) 0 60 - 1 30 mg/dL    Glucose, Fasting 95 65 - 99 mg/dL    Calcium 8 5 8 3 - 10 1 mg/dL    AST 14 5 - 45 U/L    ALT 25 12 - 78 U/L    Alkaline Phosphatase 58 46 - 116 U/L    Total Protein 7 3 6 4 - 8 2 g/dL    Albumin 3 8 3 5 - 5 0 g/dL    Total Bilirubin 0 36 0 20 - 1 00 mg/dL    eGFR 62 ml/min/1 73sq m   Lipid Panel with Direct LDL reflex   Result Value Ref Range    Cholesterol 134 50 - 200 mg/dL    Triglycerides 123 <=150 mg/dL    HDL, Direct 38 (L) 40 - 60 mg/dL    LDL Calculated 71 0 - 100 mg/dL       No orders of the defined types were placed in this encounter  ALLERGIES:  No Known Allergies    Current Medications     Current Outpatient Prescriptions   Medication Sig Dispense Refill    amLODIPine (NORVASC) 5 mg tablet Take 1 tablet by mouth daily      escitalopram (LEXAPRO) 20 mg tablet Take 1 tablet by mouth daily      nebivolol (BYSTOLIC) 5 mg tablet Take 1 tablet (5 mg total) by mouth daily 90 tablet 1    pantoprazole (PROTONIX) 40 mg tablet Take 40 mg by mouth daily       No current facility-administered medications for this visit            Health Maintenance     Health Maintenance   Topic Date Due    HIV SCREENING  1965    Hepatitis C Screening  1965    PNEUMOCOCCAL POLYSACCHARIDE VACCINE AGE 2-64 HIGH RISK  07/05/1966    DTaP,Tdap,and Td Vaccines (1 - Tdap) 07/05/1971    INFLUENZA VACCINE  09/01/2017    Depression Screening PHQ-9  01/30/2019     Immunization History   Administered Date(s) Administered    Influenza Quadrivalent Preservative Free 3 years and older IM 11/27/2015    Influenza TIV (IM) 11/29/2005       Justus Vogel MD

## 2018-03-10 DIAGNOSIS — K21.9 CHRONIC GERD: Primary | ICD-10-CM

## 2018-03-10 RX ORDER — PANTOPRAZOLE SODIUM 40 MG/1
TABLET, DELAYED RELEASE ORAL
Qty: 30 TABLET | Refills: 5 | Status: SHIPPED | OUTPATIENT
Start: 2018-03-10 | End: 2018-09-08 | Stop reason: SDUPTHER

## 2018-04-03 DIAGNOSIS — I10 ESSENTIAL HYPERTENSION: Primary | ICD-10-CM

## 2018-04-04 RX ORDER — AMLODIPINE BESYLATE 5 MG/1
TABLET ORAL
Qty: 30 TABLET | Refills: 3 | Status: SHIPPED | OUTPATIENT
Start: 2018-04-04 | End: 2018-04-26 | Stop reason: SDUPTHER

## 2018-04-26 DIAGNOSIS — I10 ESSENTIAL HYPERTENSION: ICD-10-CM

## 2018-04-26 RX ORDER — AMLODIPINE BESYLATE 5 MG/1
TABLET ORAL
Qty: 30 TABLET | Refills: 3 | Status: SHIPPED | OUTPATIENT
Start: 2018-04-26 | End: 2018-07-27 | Stop reason: SDUPTHER

## 2018-07-24 ENCOUNTER — TELEPHONE (OUTPATIENT)
Dept: FAMILY MEDICINE CLINIC | Facility: CLINIC | Age: 53
End: 2018-07-24

## 2018-07-25 DIAGNOSIS — I10 ESSENTIAL HYPERTENSION: ICD-10-CM

## 2018-07-26 RX ORDER — NEBIVOLOL 5 MG/1
5 TABLET ORAL DAILY
Qty: 90 TABLET | Refills: 0 | Status: SHIPPED | OUTPATIENT
Start: 2018-07-26 | End: 2018-09-08

## 2018-07-26 NOTE — TELEPHONE ENCOUNTER
Please contact patient  I refilled his Bystolic  I have not seen him in the office since January  He was supposed to proceed with blood work and schedule follow-up  Please remind him to do so    Thank you

## 2018-07-27 ENCOUNTER — TELEPHONE (OUTPATIENT)
Dept: FAMILY MEDICINE CLINIC | Facility: CLINIC | Age: 53
End: 2018-07-27

## 2018-07-27 DIAGNOSIS — I10 ESSENTIAL HYPERTENSION: ICD-10-CM

## 2018-07-27 RX ORDER — AMLODIPINE BESYLATE 5 MG/1
5 TABLET ORAL DAILY
Qty: 90 TABLET | Refills: 0 | Status: SHIPPED | OUTPATIENT
Start: 2018-07-27 | End: 2018-09-08 | Stop reason: SDUPTHER

## 2018-07-27 NOTE — TELEPHONE ENCOUNTER
Bystolic is not covered with pts new [employer]  PA is required and started:,  Ref# [id number]  Pt has not been taking this or the Amlodipine for his BP  He did state he was taking Cherry Extract and losing weight and has not s/s of HTN  He does have an appt scheduled with you 8/22/18  I did request the refill of the Amlodipine but he does not have any Bystolic

## 2018-08-03 NOTE — TELEPHONE ENCOUNTER
PA for Bystolic denied  Pt must have documentation of trial and failure of at least five of the formulary alternatives:  1  Acebutolol  2  Atenolol  3  Betaxolol  4  Bisoprolol  5  Carvedilol  6  Labetolol  7  Metoprolol Succinate  8  Metoprolol Tartrate  9  Nadolol  10  Propranolol  11  Timolol    Please change to one of the formulary alternatives  Please call pt with outcome

## 2018-09-08 ENCOUNTER — OFFICE VISIT (OUTPATIENT)
Dept: FAMILY MEDICINE CLINIC | Facility: CLINIC | Age: 53
End: 2018-09-08
Payer: COMMERCIAL

## 2018-09-08 ENCOUNTER — APPOINTMENT (OUTPATIENT)
Dept: LAB | Facility: CLINIC | Age: 53
End: 2018-09-08
Payer: COMMERCIAL

## 2018-09-08 VITALS
TEMPERATURE: 97.7 F | WEIGHT: 218.6 LBS | HEART RATE: 66 BPM | RESPIRATION RATE: 16 BRPM | DIASTOLIC BLOOD PRESSURE: 78 MMHG | HEIGHT: 68 IN | SYSTOLIC BLOOD PRESSURE: 122 MMHG | BODY MASS INDEX: 33.13 KG/M2

## 2018-09-08 DIAGNOSIS — Z12.5 ENCOUNTER FOR PROSTATE CANCER SCREENING: ICD-10-CM

## 2018-09-08 DIAGNOSIS — I10 BENIGN ESSENTIAL HTN: ICD-10-CM

## 2018-09-08 DIAGNOSIS — K21.9 CHRONIC GERD: ICD-10-CM

## 2018-09-08 DIAGNOSIS — F41.9 ANXIETY: ICD-10-CM

## 2018-09-08 DIAGNOSIS — K21.9 GASTROESOPHAGEAL REFLUX DISEASE WITHOUT ESOPHAGITIS: ICD-10-CM

## 2018-09-08 DIAGNOSIS — I10 ESSENTIAL HYPERTENSION: Primary | ICD-10-CM

## 2018-09-08 LAB
ALBUMIN SERPL BCP-MCNC: 3.9 G/DL (ref 3.5–5)
ALP SERPL-CCNC: 68 U/L (ref 46–116)
ALT SERPL W P-5'-P-CCNC: 32 U/L (ref 12–78)
ANION GAP SERPL CALCULATED.3IONS-SCNC: 8 MMOL/L (ref 4–13)
AST SERPL W P-5'-P-CCNC: 15 U/L (ref 5–45)
BILIRUB SERPL-MCNC: 0.44 MG/DL (ref 0.2–1)
BUN SERPL-MCNC: 19 MG/DL (ref 5–25)
CALCIUM SERPL-MCNC: 8.4 MG/DL (ref 8.3–10.1)
CHLORIDE SERPL-SCNC: 103 MMOL/L (ref 100–108)
CHOLEST SERPL-MCNC: 148 MG/DL (ref 50–200)
CO2 SERPL-SCNC: 29 MMOL/L (ref 21–32)
CREAT SERPL-MCNC: 1.4 MG/DL (ref 0.6–1.3)
ERYTHROCYTE [DISTWIDTH] IN BLOOD BY AUTOMATED COUNT: 13 % (ref 11.6–15.1)
GFR SERPL CREATININE-BSD FRML MDRD: 57 ML/MIN/1.73SQ M
GLUCOSE P FAST SERPL-MCNC: 95 MG/DL (ref 65–99)
HCT VFR BLD AUTO: 44.2 % (ref 36.5–49.3)
HDLC SERPL-MCNC: 34 MG/DL (ref 40–60)
HGB BLD-MCNC: 14.6 G/DL (ref 12–17)
LDLC SERPL CALC-MCNC: 78 MG/DL (ref 0–100)
MCH RBC QN AUTO: 28.8 PG (ref 26.8–34.3)
MCHC RBC AUTO-ENTMCNC: 33 G/DL (ref 31.4–37.4)
MCV RBC AUTO: 87 FL (ref 82–98)
NONHDLC SERPL-MCNC: 114 MG/DL
PLATELET # BLD AUTO: 227 THOUSANDS/UL (ref 149–390)
PMV BLD AUTO: 10.3 FL (ref 8.9–12.7)
POTASSIUM SERPL-SCNC: 3.8 MMOL/L (ref 3.5–5.3)
PROT SERPL-MCNC: 7 G/DL (ref 6.4–8.2)
PSA SERPL-MCNC: 0.6 NG/ML (ref 0–4)
RBC # BLD AUTO: 5.07 MILLION/UL (ref 3.88–5.62)
SODIUM SERPL-SCNC: 140 MMOL/L (ref 136–145)
TRIGL SERPL-MCNC: 181 MG/DL
TSH SERPL DL<=0.05 MIU/L-ACNC: 0.61 UIU/ML (ref 0.36–3.74)
WBC # BLD AUTO: 5.5 THOUSAND/UL (ref 4.31–10.16)

## 2018-09-08 PROCEDURE — G0103 PSA SCREENING: HCPCS

## 2018-09-08 PROCEDURE — 3008F BODY MASS INDEX DOCD: CPT | Performed by: FAMILY MEDICINE

## 2018-09-08 PROCEDURE — 36415 COLL VENOUS BLD VENIPUNCTURE: CPT

## 2018-09-08 PROCEDURE — 80053 COMPREHEN METABOLIC PANEL: CPT

## 2018-09-08 PROCEDURE — 3074F SYST BP LT 130 MM HG: CPT | Performed by: FAMILY MEDICINE

## 2018-09-08 PROCEDURE — 85027 COMPLETE CBC AUTOMATED: CPT

## 2018-09-08 PROCEDURE — 80061 LIPID PANEL: CPT

## 2018-09-08 PROCEDURE — 1036F TOBACCO NON-USER: CPT | Performed by: FAMILY MEDICINE

## 2018-09-08 PROCEDURE — 84443 ASSAY THYROID STIM HORMONE: CPT

## 2018-09-08 PROCEDURE — 3078F DIAST BP <80 MM HG: CPT | Performed by: FAMILY MEDICINE

## 2018-09-08 PROCEDURE — 99213 OFFICE O/P EST LOW 20 MIN: CPT | Performed by: FAMILY MEDICINE

## 2018-09-08 RX ORDER — AMLODIPINE BESYLATE 5 MG/1
5 TABLET ORAL DAILY
Qty: 90 TABLET | Refills: 3 | Status: SHIPPED | OUTPATIENT
Start: 2018-09-08 | End: 2019-10-12 | Stop reason: SDUPTHER

## 2018-09-08 RX ORDER — ESCITALOPRAM OXALATE 20 MG/1
20 TABLET ORAL DAILY
Qty: 90 TABLET | Refills: 3 | Status: SHIPPED | OUTPATIENT
Start: 2018-09-08 | End: 2019-10-12 | Stop reason: SDUPTHER

## 2018-09-08 RX ORDER — PANTOPRAZOLE SODIUM 40 MG/1
TABLET, DELAYED RELEASE ORAL
Qty: 90 TABLET | Refills: 3 | Status: SHIPPED | OUTPATIENT
Start: 2018-09-08 | End: 2019-10-12 | Stop reason: SDUPTHER

## 2018-09-08 NOTE — PROGRESS NOTES
FAMILY PRACTICE OFFICE VISIT       NAME: Alexa Pena  AGE: 46 y o  SEX: male       : 1965        MRN: [id number]        Assessment and Plan     Problem List Items Addressed This Visit     Acid reflux disease    Relevant Medications    pantoprazole (PROTONIX) 40 mg tablet    Anxiety    Relevant Medications    escitalopram (LEXAPRO) 20 mg tablet      Other Visit Diagnoses     Essential hypertension    -  Primary    Relevant Medications    amLODIPine (NORVASC) 5 mg tablet    Chronic GERD        Relevant Medications    pantoprazole (PROTONIX) 40 mg tablet       Patient presents for follow-up of chronic medical conditions  He will continue same daily medications for hypertension, generalized anxiety disorder and acid reflux disease  I advised him to proceed with blood work including CBC, CMP, TSH and lipid panel  I commended him on significant lifestyle changes  Blood pressure is well controlled  Will follow up in 9-12 months and pending lab results  There are no Patient Instructions on file for this visit  Chief Complaint     Chief Complaint   Patient presents with    Follow-up     Patient is here for a follow up visit  History of Present Illness     Patient presents for follow-up of chronic medical conditions  He has been using Norvasc 5 mg once a day for hypertension  He denies any adverse side effects with this medication  He has been exercising, following healthy diet and trying to lose weight  Patient denies chest pain, palpitations, shortness of breath or dizziness  He remains on Lexapro 20 mg daily for symptoms of generalized anxiety disorder  He has been using Protonix daily for symptoms of acid reflux  Patient is up-to-date with colonoscopy, 2016 - Dr Rosalba Sanchez  He unfortunately did not proceed with blood work that was ordered        Review of Systems   Review of Systems   Constitutional: Negative  HENT: Negative  Respiratory: Negative      Cardiovascular: Negative  Gastrointestinal: Negative  Endocrine: Negative  Genitourinary: Negative  Musculoskeletal: Negative  Allergic/Immunologic: Negative  Neurological: Negative  Hematological: Negative  Psychiatric/Behavioral: Negative  Active Problem List     Patient Active Problem List   Diagnosis    Acid reflux disease    Asthma    Benign essential HTN    Anxiety       Past Medical History:  No past medical history on file  Past Surgical History:  Past Surgical History:   Procedure Laterality Date    VASECTOMY      Surgery Vas Deferens Vasectomy       Family History:  Family History   Problem Relation Age of Onset    Diabetes Father         Diabetes Mellitus    Hypertension Father     Breast cancer Sister        Social History:  Social History     Social History    Marital status: /Civil Union     Spouse name: N/A    Number of children: N/A    Years of education: N/A     Occupational History    Not on file  Social History Main Topics    Smoking status: Never Smoker    Smokeless tobacco: Never Used    Alcohol use Yes      Comment: social    Drug use: No    Sexual activity: Not on file     Other Topics Concern    Not on file     Social History Narrative    No narrative on file       Objective     Vitals:    09/08/18 0928 09/08/18 1000   BP: 122/78 122/78   Pulse: 66    Resp: 16    Temp: 97 7 °F (36 5 °C)    TempSrc: Tympanic    Weight: 99 2 kg (218 lb 9 6 oz)    Height: 5' 8" (1 727 m)        Vitals:    09/08/18 1000   BP: 122/78   Pulse:    Resp:    Temp:      Wt Readings from Last 3 Encounters:   09/08/18 99 2 kg (218 lb 9 6 oz)   02/19/18 98 8 kg (217 lb 12 8 oz)   01/30/18 99 8 kg (220 lb)       Physical Exam   Constitutional: He is oriented to person, place, and time  He appears well-developed and well-nourished  HENT:   Head: Normocephalic and atraumatic  Eyes: Conjunctivae are normal    Neck: Neck supple  Carotid bruit is not present     Cardiovascular: Normal rate, regular rhythm and normal heart sounds  No murmur heard  Pulmonary/Chest: Effort normal and breath sounds normal  No respiratory distress  He has no wheezes  He has no rales  Musculoskeletal: Normal range of motion  Neurological: He is alert and oriented to person, place, and time  No cranial nerve deficit  Psychiatric: He has a normal mood and affect  His behavior is normal    Nursing note and vitals reviewed        Pertinent Laboratory/Diagnostic Studies:  Lab Results   Component Value Date    GLUCOSE 102 07/21/2014    BUN 19 09/08/2018    CREATININE 1 40 (H) 09/08/2018    CALCIUM 8 4 09/08/2018     09/08/2018    K 3 8 09/08/2018    CO2 29 09/08/2018     09/08/2018     Lab Results   Component Value Date    ALT 32 09/08/2018    AST 15 09/08/2018    ALKPHOS 68 09/08/2018    BILITOT 0 6 01/08/2016       Lab Results   Component Value Date    WBC 5 50 09/08/2018    HGB 14 6 09/08/2018    HCT 44 2 09/08/2018    MCV 87 09/08/2018     09/08/2018       No results found for: TSH    Lab Results   Component Value Date    CHOL 181 01/08/2016     Lab Results   Component Value Date    TRIG 181 (H) 09/08/2018     Lab Results   Component Value Date    HDL 34 (L) 09/08/2018     Lab Results   Component Value Date    LDLCALC 78 09/08/2018     No results found for: HGBA1C    Results for orders placed or performed in visit on 10/06/17   Comprehensive metabolic panel   Result Value Ref Range    Sodium 140 136 - 145 mmol/L    Potassium 3 5 3 5 - 5 3 mmol/L    Chloride 106 100 - 108 mmol/L    CO2 26 21 - 32 mmol/L    ANION GAP 8 4 - 13 mmol/L    BUN 14 5 - 25 mg/dL    Creatinine 1 31 (H) 0 60 - 1 30 mg/dL    Glucose, Fasting 95 65 - 99 mg/dL    Calcium 8 5 8 3 - 10 1 mg/dL    AST 14 5 - 45 U/L    ALT 25 12 - 78 U/L    Alkaline Phosphatase 58 46 - 116 U/L    Total Protein 7 3 6 4 - 8 2 g/dL    Albumin 3 8 3 5 - 5 0 g/dL    Total Bilirubin 0 36 0 20 - 1 00 mg/dL    eGFR 62 ml/min/1 73sq m   Lipid Panel with Direct LDL reflex   Result Value Ref Range    Cholesterol 134 50 - 200 mg/dL    Triglycerides 123 <=150 mg/dL    HDL, Direct 38 (L) 40 - 60 mg/dL    LDL Calculated 71 0 - 100 mg/dL       No orders of the defined types were placed in this encounter  ALLERGIES:  No Known Allergies    Current Medications     Current Outpatient Prescriptions   Medication Sig Dispense Refill    amLODIPine (NORVASC) 5 mg tablet Take 1 tablet (5 mg total) by mouth daily 90 tablet 3    escitalopram (LEXAPRO) 20 mg tablet Take 1 tablet (20 mg total) by mouth daily 90 tablet 3    pantoprazole (PROTONIX) 40 mg tablet Take 1 tablet once a day in the morning half an hour before breakfast 90 tablet 3     No current facility-administered medications for this visit            Health Maintenance     Health Maintenance   Topic Date Due    CRC Screening: Colonoscopy  1965    DTaP,Tdap,and Td Vaccines (1 - Tdap) 08/21/2019 (Originally 7/5/1985)    INFLUENZA VACCINE  09/07/2019 (Originally 9/1/2018)    Depression Screening PHQ  09/08/2019     Immunization History   Administered Date(s) Administered    Influenza Quadrivalent Preservative Free 3 years and older IM 11/27/2015    Influenza TIV (IM) 11/29/2005       Arsen France MD

## 2018-09-12 ENCOUNTER — TELEPHONE (OUTPATIENT)
Dept: FAMILY MEDICINE CLINIC | Facility: CLINIC | Age: 53
End: 2018-09-12

## 2018-09-12 DIAGNOSIS — I10 BENIGN ESSENTIAL HTN: Primary | ICD-10-CM

## 2018-09-12 DIAGNOSIS — N18.30 CHRONIC RENAL DISEASE, STAGE 3, MODERATELY DECREASED GLOMERULAR FILTRATION RATE BETWEEN 30-59 ML/MIN/1.73 SQUARE METER (HCC): ICD-10-CM

## 2018-09-13 NOTE — TELEPHONE ENCOUNTER
Please contact patient  I received his blood work  It was all normal aside from still decreased kidney function  I strongly advise patient to proceed with kidney ultrasound that I have ordered over a year ago!! We also need to check urinalysis and keep an eye on kidney function via blood work  I will place new orders for kidney ultrasound  Please advise him to repeat nonfasting BMP in 3-4 weeks along with urinalysis  Please advise him to drink plenty of water! Please emphasize importance of this follow-up!!!      Thank you

## 2018-11-14 DIAGNOSIS — K21.9 CHRONIC GERD: ICD-10-CM

## 2018-11-14 RX ORDER — PANTOPRAZOLE SODIUM 40 MG/1
TABLET, DELAYED RELEASE ORAL
Qty: 30 TABLET | Refills: 5 | Status: SHIPPED | OUTPATIENT
Start: 2018-11-14 | End: 2020-06-07

## 2019-01-09 ENCOUNTER — HOSPITAL ENCOUNTER (OUTPATIENT)
Dept: ULTRASOUND IMAGING | Facility: HOSPITAL | Age: 54
Discharge: HOME | End: 2019-01-09
Payer: COMMERCIAL

## 2019-01-09 DIAGNOSIS — N18.30 CHRONIC RENAL DISEASE, STAGE 3, MODERATELY DECREASED GLOMERULAR FILTRATION RATE BETWEEN 30-59 ML/MIN/1.73 SQUARE METER (HCC): ICD-10-CM

## 2019-01-09 DIAGNOSIS — I10 BENIGN ESSENTIAL HTN: ICD-10-CM

## 2019-01-09 PROCEDURE — 76770 US EXAM ABDO BACK WALL COMP: CPT

## 2019-01-17 ENCOUNTER — TELEPHONE (OUTPATIENT)
Dept: FAMILY MEDICINE CLINIC | Facility: CLINIC | Age: 54
End: 2019-01-17

## 2019-01-17 DIAGNOSIS — N28.1 KIDNEY CYSTS: Primary | ICD-10-CM

## 2019-01-17 NOTE — TELEPHONE ENCOUNTER
----- Message from Damon Mcarthur MD sent at 1/16/2019  9:59 PM EST -----  Please contact patient  I received results of kidney and bladder ultrasound  Overall it looks normal, kidneys look healthy on ultrasound  Radiologist does see 2 small cysts on the left and right kidney  This is not a worrisome finding but since this is patient's baseline - II would  Recommend  to repeat US to recheck those cysts in 6 months    I will place orders for repeat kidney ultrasound in July/August   Thank you

## 2019-07-01 ENCOUNTER — TELEPHONE (OUTPATIENT)
Dept: FAMILY MEDICINE CLINIC | Facility: CLINIC | Age: 54
End: 2019-07-01

## 2019-07-01 DIAGNOSIS — Z01.84 IMMUNITY STATUS TESTING: ICD-10-CM

## 2019-07-01 DIAGNOSIS — Z11.1 SCREENING FOR TUBERCULOSIS: Primary | ICD-10-CM

## 2019-07-01 NOTE — TELEPHONE ENCOUNTER
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   Please call to advise

## 2019-07-02 ENCOUNTER — APPOINTMENT (OUTPATIENT)
Dept: LAB | Facility: CLINIC | Age: 54
End: 2019-07-02
Payer: COMMERCIAL

## 2019-07-02 ENCOUNTER — TRANSCRIBE ORDERS (OUTPATIENT)
Dept: LAB | Facility: CLINIC | Age: 54
End: 2019-07-02

## 2019-07-02 DIAGNOSIS — Z01.84 IMMUNITY STATUS TESTING: ICD-10-CM

## 2019-07-02 DIAGNOSIS — Z11.1 SCREENING FOR TUBERCULOSIS: ICD-10-CM

## 2019-07-02 DIAGNOSIS — I10 BENIGN ESSENTIAL HTN: ICD-10-CM

## 2019-07-02 DIAGNOSIS — N18.30 CHRONIC RENAL DISEASE, STAGE 3, MODERATELY DECREASED GLOMERULAR FILTRATION RATE BETWEEN 30-59 ML/MIN/1.73 SQUARE METER (HCC): ICD-10-CM

## 2019-07-02 LAB
ANION GAP SERPL CALCULATED.3IONS-SCNC: 8 MMOL/L (ref 4–13)
BUN SERPL-MCNC: 12 MG/DL (ref 5–25)
CALCIUM SERPL-MCNC: 8.8 MG/DL (ref 8.3–10.1)
CHLORIDE SERPL-SCNC: 105 MMOL/L (ref 100–108)
CO2 SERPL-SCNC: 24 MMOL/L (ref 21–32)
CREAT SERPL-MCNC: 1.52 MG/DL (ref 0.6–1.3)
GFR SERPL CREATININE-BSD FRML MDRD: 51 ML/MIN/1.73SQ M
GLUCOSE SERPL-MCNC: 113 MG/DL (ref 65–140)
HBV SURFACE AB SER-ACNC: <3.1 MIU/ML
POTASSIUM SERPL-SCNC: 3.4 MMOL/L (ref 3.5–5.3)
RUBV IGG SERPL IA-ACNC: 16.9 IU/ML
SODIUM SERPL-SCNC: 137 MMOL/L (ref 136–145)

## 2019-07-02 PROCEDURE — 80048 BASIC METABOLIC PNL TOTAL CA: CPT

## 2019-07-02 PROCEDURE — 86480 TB TEST CELL IMMUN MEASURE: CPT

## 2019-07-02 PROCEDURE — 86706 HEP B SURFACE ANTIBODY: CPT

## 2019-07-02 PROCEDURE — 86787 VARICELLA-ZOSTER ANTIBODY: CPT

## 2019-07-02 PROCEDURE — 86762 RUBELLA ANTIBODY: CPT

## 2019-07-02 PROCEDURE — 36415 COLL VENOUS BLD VENIPUNCTURE: CPT

## 2019-07-02 PROCEDURE — 86735 MUMPS ANTIBODY: CPT

## 2019-07-02 PROCEDURE — 86765 RUBEOLA ANTIBODY: CPT

## 2019-07-04 LAB
MEV IGG SER QL: NORMAL
MUV IGG SER QL: NORMAL

## 2019-07-05 ENCOUNTER — TELEPHONE (OUTPATIENT)
Dept: FAMILY MEDICINE CLINIC | Facility: CLINIC | Age: 54
End: 2019-07-05

## 2019-07-05 DIAGNOSIS — I10 BENIGN ESSENTIAL HTN: ICD-10-CM

## 2019-07-05 DIAGNOSIS — N18.30 CHRONIC RENAL DISEASE, STAGE 3, MODERATELY DECREASED GLOMERULAR FILTRATION RATE BETWEEN 30-59 ML/MIN/1.73 SQUARE METER (HCC): Primary | ICD-10-CM

## 2019-07-05 LAB
GAMMA INTERFERON BACKGROUND BLD IA-ACNC: 0.02 IU/ML
M TB IFN-G BLD-IMP: NEGATIVE
M TB IFN-G CD4+ BCKGRND COR BLD-ACNC: -0.01 IU/ML
M TB IFN-G CD4+ BCKGRND COR BLD-ACNC: -0.01 IU/ML
MITOGEN IGNF BCKGRD COR BLD-ACNC: >10 IU/ML

## 2019-07-05 NOTE — TELEPHONE ENCOUNTER
Please contact patient  I received blood work results  · Patient has good immunity against measles, mumps and rubella  · Testing for tuberculosis is negative  · He does not have immunity against hepatitis B and we need to schedule hepatitis-B vaccine series in the office  · Testing for immunity against chickenpox is still pending  · Patient's kidney function is still decreased, slightly worse than last study 10 months ago  His potassium level is slightly decreased as well  Please advise him to follow high potassium diet, I strongly advised him to schedule consultation with [facility], kidney specialists, patient should be drinking plenty of water    · We need to repeat his blood work in a few weeks to recheck kidney function and potassium, orders placed, no need to fast   Thank you

## 2019-07-06 LAB — VZV IGG SER IA-ACNC: NORMAL

## 2019-07-08 NOTE — TELEPHONE ENCOUNTER
Please contact patient  I believe my message was very important  If patient does not understand did-then I would rather schedule a follow-up office visits we can review results of his blood work and long-term follow-up  Please schedule follow-up with me, 15 minutes office visit, will do hepatitis B vaccine at that time  Let me know there is any scheduling conflicts I will try to accommodate this patient    Thank you

## 2019-07-08 NOTE — TELEPHONE ENCOUNTER
Spoke with patient and will  a copy of the task so he understands more as to what is going on  He states that e message is hard to make a decision  Other than that no other questions at this time and he will call to schedule Hep B injection

## 2019-07-09 ENCOUNTER — OFFICE VISIT (OUTPATIENT)
Dept: FAMILY MEDICINE CLINIC | Facility: CLINIC | Age: 54
End: 2019-07-09
Payer: COMMERCIAL

## 2019-07-09 VITALS
HEART RATE: 78 BPM | TEMPERATURE: 98.5 F | BODY MASS INDEX: 33.49 KG/M2 | WEIGHT: 221 LBS | DIASTOLIC BLOOD PRESSURE: 82 MMHG | HEIGHT: 68 IN | SYSTOLIC BLOOD PRESSURE: 124 MMHG

## 2019-07-09 DIAGNOSIS — N18.30 CHRONIC RENAL DISEASE, STAGE 3, MODERATELY DECREASED GLOMERULAR FILTRATION RATE BETWEEN 30-59 ML/MIN/1.73 SQUARE METER (HCC): Primary | ICD-10-CM

## 2019-07-09 DIAGNOSIS — Z23 NEED FOR HEPATITIS B VACCINATION: ICD-10-CM

## 2019-07-09 DIAGNOSIS — I10 BENIGN ESSENTIAL HTN: ICD-10-CM

## 2019-07-09 DIAGNOSIS — Z01.84 LACK OF IMMUNITY TO HEPATITIS B VIRUS DEMONSTRATED BY SEROLOGIC TEST: ICD-10-CM

## 2019-07-09 DIAGNOSIS — F41.9 ANXIETY: ICD-10-CM

## 2019-07-09 DIAGNOSIS — K21.9 GASTROESOPHAGEAL REFLUX DISEASE WITHOUT ESOPHAGITIS: ICD-10-CM

## 2019-07-09 PROCEDURE — 90471 IMMUNIZATION ADMIN: CPT

## 2019-07-09 PROCEDURE — 99213 OFFICE O/P EST LOW 20 MIN: CPT | Performed by: FAMILY MEDICINE

## 2019-07-09 PROCEDURE — 90746 HEPB VACCINE 3 DOSE ADULT IM: CPT

## 2019-07-09 PROCEDURE — 3074F SYST BP LT 130 MM HG: CPT | Performed by: FAMILY MEDICINE

## 2019-07-09 PROCEDURE — 3008F BODY MASS INDEX DOCD: CPT | Performed by: FAMILY MEDICINE

## 2019-07-09 NOTE — PROGRESS NOTES
FAMILY PRACTICE OFFICE VISIT       NAME: Jordan Mcqueen  AGE: 53 y o  SEX: male       : 1965        MRN: [id number]        Assessment and Plan     Problem List Items Addressed This Visit        Digestive    Acid reflux disease     Symptoms are well controlled Protonix            Cardiovascular and Mediastinum    Benign essential HTN     Norvasc 5 mg daily             Genitourinary    Chronic renal disease, stage 3, moderately decreased glomerular filtration rate between 30-59 mL/min/1 73 square meter (HCC) - Primary       Other    Anxiety     Continue Lexapro 20 mg daily, symptoms are well controlled           Other Visit Diagnoses     Need for hepatitis B vaccination        Relevant Orders    HEPATITIS B VACCINE ADULT IM (Completed)    Lack of immunity to hepatitis B virus demonstrated by serologic test           Patient presents for evaluation of hypertension, chronic renal insufficiency, new onset of mild hypokalemia  Pending evaluation with Nephrology  He likely will benefit from further workup with evaluation of renin/aldosterone levels, will defer to Nephrology team   I advised patient to discuss results of renal ultrasound performed in January at his forthcoming appointment with Nephrology  Blood pressure is well controlled on Norvasc 5 mg daily  Patient will continue same daily medications for chronic medical conditions  Hepatitis-B vaccine was administered today, will schedule follow-up vaccination series  There are no Patient Instructions on file for this visit  Discussed with the patient and all questioned fully answered  He will call me if any problems arise  M*Modal software was used to dictate this note  It may contain errors with dictating incorrect words/spelling  Please contact provider directly with any questions  Chief Complaint   No chief complaint on file        History of Present Illness     Patient presents for follow-up of hypertension and chronic renal insufficiency  He is feeling well and remains on regimen of Norvasc, Lexapro and Protonix  Symptoms of chronic acid reflux and generalized anxiety disorder well controlled on current med regimen  Daily supplements include probiotic and new supplement by  " [employer] ' - testosterone supplement  Patient denies symptoms of chest pain, palpitations, shortness of breath or dizziness  He has been compliant with med regimen  Results of recent blood work reviewed  BMP reveals creatinine 1 5 with GFR 51 and mild hypokalemia with potassium level of 3 4  No history of hypokalemia in the past   Patient was evaluated with essentially normal kidney/bladder ultrasound in January of 2019 revealing calcification in the right kidney  I did recommend that patient should repeat ultrasound in 6 months/end of July  Patient has pending evaluation with [facility] tomorrow    Rest of the blood work:  Patient has strong immunity against Varivax, measles, mumps, rubella  QuantiFERON gold is negative  Inadequate immunity against hepatitis-B  Will be starting vaccination series today                  Review of Systems   Review of Systems   Constitutional: Negative  HENT: Negative  Eyes: Negative  Respiratory: Negative  Cardiovascular: Negative  Gastrointestinal: Negative  Endocrine: Negative  Genitourinary: Negative  Musculoskeletal: Negative  Skin: Negative  Allergic/Immunologic: Negative  Neurological: Negative  Hematological: Negative  Psychiatric/Behavioral: Negative  Active Problem List     Patient Active Problem List   Diagnosis    Acid reflux disease    Asthma    Benign essential HTN    Anxiety    Chronic renal disease, stage 3, moderately decreased glomerular filtration rate between 30-59 mL/min/1 73 square meter ([address] )    Kidney cysts       Past Medical History:  No past medical history on file      Past Surgical History:  Past Surgical History:   Procedure Laterality Date    VASECTOMY      Surgery Vas Deferens Vasectomy       Family History:  Family History   Problem Relation Age of Onset    Diabetes Father         Diabetes Mellitus    Hypertension Father     Breast cancer Sister        Social History:  Social History     Socioeconomic History    Marital status: /Civil Union     Spouse name: Not on file    Number of children: Not on file    Years of education: Not on file    Highest education level: Not on file   Occupational History    Not on file   Social Needs    Financial resource strain: Not on file    Food insecurity:     Worry: Not on file     Inability: Not on file    Transportation needs:     Medical: Not on file     Non-medical: Not on file   Tobacco Use    Smoking status: Never Smoker    Smokeless tobacco: Never Used   Substance and Sexual Activity    Alcohol use: Yes     Comment: social    Drug use: No    Sexual activity: Not on file   Lifestyle    Physical activity:     Days per week: Not on file     Minutes per session: Not on file    Stress: Not on file   Relationships    Social connections:     Talks on phone: Not on file     Gets together: Not on file     Attends Hindu service: Not on file     Active member of club or organization: Not on file     Attends meetings of clubs or organizations: Not on file     Relationship status: Not on file    Intimate partner violence:     Fear of current or ex partner: Not on file     Emotionally abused: Not on file     Physically abused: Not on file     Forced sexual activity: Not on file   Other Topics Concern    Not on file   Social History Narrative    Not on file       Objective     Vitals:    07/09/19 1346   BP: 124/82   BP Location: Left arm   Pulse: 78   Temp: 98 5 °F (36 9 °C)   Weight: 100 kg (221 lb)   Height: 5' 8" (1 727 m)     Wt Readings from Last 3 Encounters:   07/09/19 100 kg (221 lb)   09/08/18 99 2 kg (218 lb 9 6 oz)   02/19/18 98 8 kg (217 lb 12 8 oz) Physical Exam   Constitutional: He is oriented to person, place, and time  He appears well-developed and well-nourished  HENT:   Head: Normocephalic and atraumatic  Eyes: Conjunctivae are normal    Neck: Neck supple  Carotid bruit is not present  Cardiovascular: Normal rate, regular rhythm and normal heart sounds  No murmur heard  Pulmonary/Chest: Effort normal and breath sounds normal  No respiratory distress  He has no wheezes  He has no rales  Abdominal: Bowel sounds are normal  He exhibits no distension and no abdominal bruit  Musculoskeletal: Normal range of motion  He exhibits no edema  Neurological: He is alert and oriented to person, place, and time  No cranial nerve deficit  Psychiatric: He has a normal mood and affect  His behavior is normal    Nursing note and vitals reviewed        Pertinent Laboratory/Diagnostic Studies:  Lab Results   Component Value Date    GLUCOSE 102 07/21/2014    BUN 12 07/02/2019    CREATININE 1 52 (H) 07/02/2019    CALCIUM 8 8 07/02/2019     01/08/2016    K 3 4 (L) 07/02/2019    CO2 24 07/02/2019     07/02/2019     Lab Results   Component Value Date    ALT 32 09/08/2018    AST 15 09/08/2018    ALKPHOS 68 09/08/2018    BILITOT 0 6 01/08/2016       Lab Results   Component Value Date    WBC 5 50 09/08/2018    HGB 14 6 09/08/2018    HCT 44 2 09/08/2018    MCV 87 09/08/2018     09/08/2018       No results found for: TSH    Lab Results   Component Value Date    CHOL 181 01/08/2016     Lab Results   Component Value Date    TRIG 181 (H) 09/08/2018     Lab Results   Component Value Date    HDL 34 (L) 09/08/2018     Lab Results   Component Value Date    LDLCALC 78 09/08/2018     No results found for: HGBA1C    Results for orders placed or performed in visit on 66/64/82   Basic metabolic panel   Result Value Ref Range    Sodium 137 136 - 145 mmol/L    Potassium 3 4 (L) 3 5 - 5 3 mmol/L    Chloride 105 100 - 108 mmol/L    CO2 24 21 - 32 mmol/L ANION GAP 8 4 - 13 mmol/L    BUN 12 5 - 25 mg/dL    Creatinine 1 52 (H) 0 60 - 1 30 mg/dL    Glucose 113 65 - 140 mg/dL    Calcium 8 8 8 3 - 10 1 mg/dL    eGFR 51 ml/min/1 73sq m   Rubeola antibody IgG   Result Value Ref Range    Rubeola IgG IMMUNE IMMUNE   Rubella antibody, IgG   Result Value Ref Range    Rubella IgG Quant 16 9 >9 9 IU/mL   Mumps antibody, IgG   Result Value Ref Range    Mumps IgG IMMUNE IMMUNE   Varicella zoster antibody, IgG   Result Value Ref Range    Varicella IgG IMMUNE IMMUNE   Hepatitis B surface antibody   Result Value Ref Range    Hep B S Ab <3 10 mIU/mL   Quantiferon TB Gold Plus   Result Value Ref Range    QFT Nil 0 02 0 - 8 0 IU/ml    QFT TB1-NIL -0 01 IU/ml    QFT TB2-NIL -0 01 IU/ml    QFT Mitogen-NIL >10 00 IU/ml    QFT Final Interpretation Negative Negative       Orders Placed This Encounter   Procedures    HEPATITIS B VACCINE ADULT IM       ALLERGIES:  No Known Allergies    Current Medications     Current Outpatient Medications   Medication Sig Dispense Refill    amLODIPine (NORVASC) 5 mg tablet Take 1 tablet (5 mg total) by mouth daily 90 tablet 3    escitalopram (LEXAPRO) 20 mg tablet Take 1 tablet (20 mg total) by mouth daily 90 tablet 3    pantoprazole (PROTONIX) 40 mg tablet Take 1 tablet once a day in the morning half an hour before breakfast 90 tablet 3    pantoprazole (PROTONIX) 40 mg tablet TAKE 1 TABLET 30 MINUTES BEFORE BREAKFAST DAILY AS NEEDED : ACID REFLUX 30 tablet 5     No current facility-administered medications for this visit  There are no discontinued medications      Health Maintenance     Health Maintenance   Topic Date Due    Hepatitis C Screening  1965    Pneumococcal Vaccine: Pediatrics (0 to 5 Years) and At-Risk Patients (6 to 59 Years) (1 of 3 - PCV13) 07/05/1970    BMI: Followup Plan  07/05/1982    CRC Screening: Colonoscopy  08/22/2019    DTaP,Tdap,and Td Vaccines (1 - Tdap) 08/21/2019 (Originally 7/5/1985)    INFLUENZA VACCINE 09/07/2019 (Originally 7/1/2019)    HEPATITIS B VACCINES (2 of 3 - Risk 3-dose series) 08/06/2019    Depression Screening PHQ  09/08/2019    BMI: Adult  07/09/2020    Pneumococcal Vaccine: 65+ Years (1 of 2 - PCV13) 07/05/2029       Immunization History   Administered Date(s) Administered    Hep B, adult 07/09/2019    Influenza Quadrivalent Preservative Free 3 years and older IM 11/27/2015    Influenza TIV (IM) 11/29/2005       Vona Harada, MD

## 2019-10-12 DIAGNOSIS — K21.9 CHRONIC GERD: ICD-10-CM

## 2019-10-12 DIAGNOSIS — I10 ESSENTIAL HYPERTENSION: ICD-10-CM

## 2019-10-12 DIAGNOSIS — F41.9 ANXIETY: ICD-10-CM

## 2019-10-13 RX ORDER — ESCITALOPRAM OXALATE 20 MG/1
TABLET ORAL
Qty: 90 TABLET | Refills: 3 | Status: SHIPPED | OUTPATIENT
Start: 2019-10-13 | End: 2020-11-09

## 2019-10-13 RX ORDER — PANTOPRAZOLE SODIUM 40 MG/1
TABLET, DELAYED RELEASE ORAL
Qty: 90 TABLET | Refills: 3 | Status: SHIPPED | OUTPATIENT
Start: 2019-10-13 | End: 2020-11-09

## 2019-10-13 RX ORDER — AMLODIPINE BESYLATE 5 MG/1
TABLET ORAL
Qty: 90 TABLET | Refills: 3 | Status: SHIPPED | OUTPATIENT
Start: 2019-10-13 | End: 2020-11-09

## 2020-06-02 ENCOUNTER — TELEMEDICINE (OUTPATIENT)
Dept: FAMILY MEDICINE CLINIC | Facility: CLINIC | Age: 55
End: 2020-06-02
Payer: COMMERCIAL

## 2020-06-02 VITALS
DIASTOLIC BLOOD PRESSURE: 85 MMHG | SYSTOLIC BLOOD PRESSURE: 140 MMHG | BODY MASS INDEX: 33.95 KG/M2 | HEIGHT: 68 IN | TEMPERATURE: 98 F | WEIGHT: 224 LBS

## 2020-06-02 DIAGNOSIS — Z12.5 ENCOUNTER FOR PROSTATE CANCER SCREENING: ICD-10-CM

## 2020-06-02 DIAGNOSIS — N18.30 CHRONIC RENAL DISEASE, STAGE 3, MODERATELY DECREASED GLOMERULAR FILTRATION RATE BETWEEN 30-59 ML/MIN/1.73 SQUARE METER (HCC): ICD-10-CM

## 2020-06-02 DIAGNOSIS — K21.9 GASTROESOPHAGEAL REFLUX DISEASE WITHOUT ESOPHAGITIS: ICD-10-CM

## 2020-06-02 DIAGNOSIS — F41.1 GAD (GENERALIZED ANXIETY DISORDER): ICD-10-CM

## 2020-06-02 DIAGNOSIS — I10 BENIGN ESSENTIAL HTN: Primary | ICD-10-CM

## 2020-06-02 PROCEDURE — 3077F SYST BP >= 140 MM HG: CPT | Performed by: FAMILY MEDICINE

## 2020-06-02 PROCEDURE — 3079F DIAST BP 80-89 MM HG: CPT | Performed by: FAMILY MEDICINE

## 2020-06-02 PROCEDURE — 99214 OFFICE O/P EST MOD 30 MIN: CPT | Performed by: FAMILY MEDICINE

## 2020-06-11 ENCOUNTER — TELEPHONE (OUTPATIENT)
Dept: FAMILY MEDICINE CLINIC | Facility: CLINIC | Age: 55
End: 2020-06-11

## 2020-07-02 ENCOUNTER — TELEPHONE (OUTPATIENT)
Dept: NEPHROLOGY | Facility: CLINIC | Age: 55
End: 2020-07-02

## 2020-07-02 ENCOUNTER — TELEMEDICINE (OUTPATIENT)
Dept: NEPHROLOGY | Facility: CLINIC | Age: 55
End: 2020-07-02
Payer: COMMERCIAL

## 2020-07-02 VITALS — HEIGHT: 68 IN | WEIGHT: 225 LBS | BODY MASS INDEX: 34.1 KG/M2

## 2020-07-02 DIAGNOSIS — I10 BENIGN ESSENTIAL HTN: ICD-10-CM

## 2020-07-02 DIAGNOSIS — N18.30 CHRONIC RENAL DISEASE, STAGE 3, MODERATELY DECREASED GLOMERULAR FILTRATION RATE BETWEEN 30-59 ML/MIN/1.73 SQUARE METER (HCC): ICD-10-CM

## 2020-07-02 DIAGNOSIS — N28.1 KIDNEY CYSTS: ICD-10-CM

## 2020-07-02 DIAGNOSIS — E83.9 CHRONIC KIDNEY DISEASE-MINERAL AND BONE DISORDER: Primary | ICD-10-CM

## 2020-07-02 DIAGNOSIS — M89.9 CHRONIC KIDNEY DISEASE-MINERAL AND BONE DISORDER: Primary | ICD-10-CM

## 2020-07-02 DIAGNOSIS — N18.9 CHRONIC KIDNEY DISEASE-MINERAL AND BONE DISORDER: Primary | ICD-10-CM

## 2020-07-02 PROCEDURE — 99203 OFFICE O/P NEW LOW 30 MIN: CPT | Performed by: INTERNAL MEDICINE

## 2020-07-02 PROCEDURE — 3008F BODY MASS INDEX DOCD: CPT | Performed by: INTERNAL MEDICINE

## 2020-07-02 NOTE — PATIENT INSTRUCTIONS
Try to work on non pharmacologic means of bringing BP down (weight loss, low sodium diet, exercise)  Check BP x 1 week in August 2020 and send readings in  Try to get off Protonix and use Pepcid  Check kidney ultrasound  Follow up in 4 months with repeat labs

## 2020-07-02 NOTE — PROGRESS NOTES
NEPHROLOGY OUTPATIENT CONSULTATION VIRTUAL VISIT (Video)  Ned Quevedo 53 y o  male MRN: [id number]  DATE: 07/02/20    Reason for visit: CKD  Patient with concern for COVID19 exposure; hence, did not want to come in for an actual appointment  ASSESSMENT & PLAN:  1  Chronic kidney disease, stage III:  · Baseline serum creatinine is around 1 3-1 4  · Renal function is stable - creatinine 1 07 on July 1, 2020  · The etiology of the CKD is not entirely clear - I cannot implicate hypertension or PPI use as sole causes for his CKD  In any case, he has no proteinuria which bodes for a good renal prognosis  · We discussed ways of keeping his renal function stable which include - good BP control, avoidance of nephrotoxic meds, staying generally healthy  · I asked him to try to get off Protonix and use Pepcid instead  · We will see him back in 4 months with repeat labs  2  Hypertension:  · BP is above goal with Amlodipine 5 mg daily  · For now, we will try to push for non pharmacologic means of lowering his blood pressure  · We discussed the blood pressure goal of < 130/85 mm Hg  · He will check his BP for me x 1 week in August and send in the readings  3  Mineral and bone disease:  · Check PTH and Vitamin D  Patient Instructions   Try to work on non pharmacologic means of bringing BP down (weight loss, low sodium diet, exercise)  Check BP x 1 week in August 2020 and send readings in  Try to get off Protonix and use Pepcid  Check kidney ultrasound  Follow up in 4 months with repeat labs  The patient was informed that this was a billable visit and they are aware of it and wish to proceed  Encounter provider Kirk Sena MD    Provider located at [address] [place] [id number]    Recent Visits  No visits were found meeting these conditions     Showing recent visits within past 7 days and meeting all other requirements     Future Appointments  No visits were found meeting these conditions  Showing future appointments within next 150 days and meeting all other requirements      The patient was identified by name and date of birth  Vilma Chavez was informed that this is a telemedicine visit and that the visit is being conducted through [facility] and patient was informed that this is a secure, HIPAA-compliant platform  He agrees to proceed     My office door was closed  No one else was in the room  He acknowledged consent and understanding of privacy and security of the video platform  The patient has agreed to participate and understands they can discontinue the visit at any time  Reason for consultation: Initial evaluation of CKD    HISTORY OF PRESENT ILLNESS:  Requesting Physician: Rodolfo Chávez MD    Vilma Chavez is a 53 y o  male who was referred for initial evaluation of renal disease  Drew Flaquita has a history of hypertension, GERD, anxiety, and lower back issues  He was referred for evaluation due to abnormal creatinine readings  Based on my review of the record, I note that his creatinine has been abnormal since 2014  For the most part, his creatinine has ranged between 1 3 and 1 4 from 2014 to 2018  In July 2019, his creatinine had gone up to 1 52  A renal ultrasound during that time revealed no hydronephrosis  He had routine labs recently which revealed a creatinine of 1 07  Due to the abnormal creatinine readings, he was referred for further evaluation  He reports that he is in fairly good health  He takes amlodipine 5 mg daily for hypertension  Currently, his home BP runs around 130s-140s/70s to 80s  He has been on pantoprazole for about 2 years for reflux  He has not had any previous prolonged hospital stays  He takes Advil once every 2-3 months  He denies any history of recurrent UTIs  PAST MEDICAL HISTORY:  1  HTN: Diagnosed about 3-5 years ago    No admissions for hypertensive urgency  Does not recall SBP of > 180 mm Hg  2  GERD: on PPI for the past 2 years  3  Anxiety: on Lexapro  4  DDD: sees a chiropractor  No known history of DM, HLP, CAD, CHF, CVA, PAD, COPD, AFTAB, asthma, thromboembolism, liver disease, BPH, malignancy  PAST SURGICAL HISTORY:  1  No major surgeries  ALLERGIES:  No Known Allergies    SOCIAL HISTORY:  · He never smoked cigarettes  He smokes cigars  · Social alcohol use  · No IVDA  FAMILY HISTORY:  · His father was ESRD on HD  Unclear etiology  MEDICATIONS:    Current Outpatient Medications:     amLODIPine (NORVASC) 5 mg tablet, take 1 tablet by mouth once daily, Disp: 90 tablet, Rfl: 3    escitalopram (LEXAPRO) 20 mg tablet, take 1 tablet by mouth once daily, Disp: 90 tablet, Rfl: 3    pantoprazole (PROTONIX) 40 mg tablet, TAKE 1 TABLET BY MOUTH EVERY MORNING HALF HOUR BEFORE BREAKFAST, Disp: 90 tablet, Rfl: 3    REVIEW OF SYSTEMS:  Review of Systems   Constitutional: Negative for appetite change, chills, fatigue, fever and unexpected weight change  HENT: Negative for sinus pressure and sinus pain  Eyes: Negative for visual disturbance  Respiratory: Negative for cough and shortness of breath  Cardiovascular: Negative for chest pain and leg swelling  Gastrointestinal: Negative for abdominal pain, diarrhea, nausea and vomiting  Genitourinary: Negative for dysuria and hematuria  Musculoskeletal: Negative for arthralgias  Skin: Negative for rash  Allergic/Immunologic: Negative for immunocompromised state  Neurological: Negative for dizziness and light-headedness  Hematological: Does not bruise/bleed easily  Psychiatric/Behavioral: Negative for dysphoric mood  The patient is not nervous/anxious  All the systems were reviewed and were negative except as documented on the HPI      PHYSICAL EXAMINATION:  Ht 5' 8" (1 727 m)   Wt 102 kg (225 lb)   BMI 34 21 kg/m²   Current Weight:   Body mass index is 34 21 kg/m²  Exam was limited by the quality of the video  General: conscious, coherent and not in acute distress over video  Skin: no visible rash  Eyes: appeared normal  ENT: atraumatic, appeared normal externally  Respiratory: talking in full sentences without difficulty, not in respiratory distress  CVS: could not evaluate  GI: could not evaluate  : could not evaluate  MSK: could not evaluate  Neuro: awake, alert and oriented     Psych: appropriate affect    LABORATORY RESULTS:  Results for orders placed or performed in visit on 07/01/20   Lipid Panel with Direct LDL reflex   Result Value Ref Range    Total Cholesterol 158 <200 mg/dL    HDL 37 (L) > OR = 40 mg/dL    Triglycerides 153 (H) <150 mg/dL    LDL Calculated 96 mg/dL (calc)    Chol HDLC Ratio 4 3 <5 0 (calc)    Non-HDL Cholesterol 121 <130 mg/dL (calc)   ALDOSTERONE/PLASMA RENIN ACTIVITY RATIO,LC/MS/MS   Result Value Ref Range    Aldosterone      Renin      Aldos/Renin Ratio     Comprehensive metabolic panel   Result Value Ref Range    Glucose, Random 105 (H) 65 - 99 mg/dL    BUN 14 7 - 25 mg/dL    Creatinine 1 07 0 70 - 1 33 mg/dL    eGFR Non  78 > OR = 60 mL/min/1 73m2    eGFR  90 > OR = 60 mL/min/1 73m2    SL AMB BUN/CREATININE RATIO NOT APPLICABLE 6 - 22 (calc)    Sodium 141 135 - 146 mmol/L    Potassium 4 0 3 5 - 5 3 mmol/L    Chloride 105 98 - 110 mmol/L    CO2 27 20 - 32 mmol/L    Calcium 9 0 8 6 - 10 3 mg/dL    Protein, Total 6 5 6 1 - 8 1 g/dL    Albumin 4 2 3 6 - 5 1 g/dL    Globulin 2 3 1 9 - 3 7 g/dL (calc)    Albumin/Globulin Ratio 1 8 1 0 - 2 5 (calc)    TOTAL BILIRUBIN 0 4 0 2 - 1 2 mg/dL    Alkaline Phosphatase 58 35 - 144 U/L    AST 18 10 - 35 U/L    ALT 24 9 - 46 U/L   Urinalysis with microscopic   Result Value Ref Range    Color UA YELLOW YELLOW    Urine Appearance CLEAR CLEAR    Specific Gravity 1 023 1 001 - 1 035    Ph 6 0 5 0 - 8 0    Glucose, Urine NEGATIVE NEGATIVE    Bilirubin, Urine NEGATIVE NEGATIVE    Ketone, Urine NEGATIVE NEGATIVE    Blood, Urine NEGATIVE NEGATIVE    Protein, Urine NEGATIVE NEGATIVE    SL AMB NITRITES URINE, QUAL  NEGATIVE NEGATIVE    Leukocyte Esterase NEGATIVE NEGATIVE    SL AMB WBC, URINE 0-5 < OR = 5 /HPF    RBC, Urine 0-2 < OR = 2 /HPF    Squamous Epithelial Cells NONE SEEN < OR = 5 /HPF    Bacteria, UA FEW (A) NONE SEEN /HPF    Calcium Oxalate Crystals MODERATE (A) NONE OR FEW /HPF    Hyaline Casts NONE SEEN NONE SEEN /LPF   CBC and differential   Result Value Ref Range    White Blood Cell Count 5 1 3 8 - 10 8 Thousand/uL    Red Blood Cell Count 5 09 4  20 - 5 80 Million/uL    Hemoglobin 15 2 13 2 - 17 1 g/dL    HCT 44 3 38 5 - 50 0 %    MCV 87 0 80 0 - 100 0 fL    MCH 29 9 27 0 - 33 0 pg    MCHC 34 3 32 0 - 36 0 g/dL    RDW 13 4 11 0 - 15 0 %    Platelet Count 116 805 - 400 Thousand/uL    SL AMB MPV 10 4 7 5 - 12 5 fL    Neutrophils (Absolute) 3,040 1,500 - 7,800 cells/uL    Lymphocytes (Absolute) 1,168 850 - 3,900 cells/uL    Monocytes (Absolute) 632 200 - 950 cells/uL    Eosinophils (Absolute) 199 15 - 500 cells/uL    Basophils ABS 61 0 - 200 cells/uL    Neutrophils 59 6 %    Lymphocytes 22 9 %    Monocytes 12 4 %    Eosinophils 3 9 %    Basophils PCT 1 2 %   TSH, 3rd generation   Result Value Ref Range    TSH 0 67 0 40 - 4 50 mIU/L   PSA, Total Screen   Result Value Ref Range    Prostate Specific Antigen Total 0 7 < OR = 4 0 ng/mL     IMAGIN/9/19 Renal US: R 11 5 cm, L 11 6 cm, (+) simple cysts  VIRTUAL VISIT DISCLAIMER    Milena Marinelli acknowledges that he has consented to an online visit or consultation  He understands that the online visit is based solely on information provided by him, and that, in the absence of a face-to-face physical evaluation by the physician, the diagnosis he receives is both limited and provisional in terms of accuracy and completeness   This is not intended to replace a full medical face-to-face evaluation by the physician  Aleksandra Platt understands and accepts these terms

## 2020-07-02 NOTE — LETTER
July 2, 2020     Misha Marroquin MD  [address] [facility] [address]    Patient: Julianna Webber   YOB: 1965   Date of Visit: 7/2/2020       Dear Dr Madison Camarena: Thank you for referring Roc Contreras to me for evaluation  Below are my notes for this consultation  If you have questions, please do not hesitate to call me  I look forward to following your patient along with you  Sincerely,        Vimal Baires MD        CC: No Recipients  Vimal Baires MD  7/2/2020  4:06 PM  Sign at close encounter  [address] (Video)  Julianna Webber 53 y o  male MRN: [id number]  DATE: 07/02/20    Reason for visit: CKD  Patient with concern for COVID19 exposure; hence, did not want to come in for an actual appointment  ASSESSMENT & PLAN:  1  Chronic kidney disease, stage III:  · Baseline serum creatinine is around 1 3-1 4  · Renal function is stable - creatinine 1 07 on July 1, 2020  · The etiology of the CKD is not entirely clear - I cannot implicate hypertension or PPI use as sole causes for his CKD  In any case, he has no proteinuria which bodes for a good renal prognosis  · We discussed ways of keeping his renal function stable which include - good BP control, avoidance of nephrotoxic meds, staying generally healthy  · I asked him to try to get off Protonix and use Pepcid instead  · We will see him back in 4 months with repeat labs  2  Hypertension:  · BP is above goal with Amlodipine 5 mg daily  · For now, we will try to push for non pharmacologic means of lowering his blood pressure  · We discussed the blood pressure goal of < 130/85 mm Hg  · He will check his BP for me x 1 week in August and send in the readings  3  Mineral and bone disease:  · Check PTH and Vitamin D  Patient Instructions   Try to work on non pharmacologic means of bringing BP down (weight loss, low sodium diet, exercise)     Check BP x 1 week in August 2020 and send readings in  Try to get off Protonix and use Pepcid  Check kidney ultrasound  Follow up in 4 months with repeat labs  The patient was informed that this was a billable visit and they are aware of it and wish to proceed  Encounter provider Billy Marroquin MD    Provider located at [address] [place] [id number]    Recent Visits  No visits were found meeting these conditions  Showing recent visits within past 7 days and meeting all other requirements     Future Appointments  No visits were found meeting these conditions  Showing future appointments within next 150 days and meeting all other requirements      The patient was identified by name and date of birth  Polly Jimenez was informed that this is a telemedicine visit and that the visit is being conducted through [facility] and patient was informed that this is a secure, HIPAA-compliant platform  He agrees to proceed     My office door was closed  No one else was in the room  He acknowledged consent and understanding of privacy and security of the video platform  The patient has agreed to participate and understands they can discontinue the visit at any time  Reason for consultation: Initial evaluation of CKD    HISTORY OF PRESENT ILLNESS:  Requesting Physician: Shady Asencio MD    Polly Jimenez is a 53 y o  male who was referred for initial evaluation of renal disease  Deirdre Murillo has a history of hypertension, GERD, anxiety, and lower back issues  He was referred for evaluation due to abnormal creatinine readings  Based on my review of the record, I note that his creatinine has been abnormal since 2014  For the most part, his creatinine has ranged between 1 3 and 1 4 from 2014 to 2018  In July 2019, his creatinine had gone up to 1 52  A renal ultrasound during that time revealed no hydronephrosis    He had routine labs recently which revealed a creatinine of 1 07  Due to the abnormal creatinine readings, he was referred for further evaluation  He reports that he is in fairly good health  He takes amlodipine 5 mg daily for hypertension  Currently, his home BP runs around 130s-140s/70s to 80s  He has been on pantoprazole for about 2 years for reflux  He has not had any previous prolonged hospital stays  He takes Advil once every 2-3 months  He denies any history of recurrent UTIs  PAST MEDICAL HISTORY:  1  HTN: Diagnosed about 3-5 years ago  No admissions for hypertensive urgency  Does not recall SBP of > 180 mm Hg  2  GERD: on PPI for the past 2 years  3  Anxiety: on Lexapro  4  DDD: sees a chiropractor  No known history of DM, HLP, CAD, CHF, CVA, PAD, COPD, AFTAB, asthma, thromboembolism, liver disease, BPH, malignancy  PAST SURGICAL HISTORY:  1  No major surgeries  ALLERGIES:  No Known Allergies    SOCIAL HISTORY:  · He never smoked cigarettes  He smokes cigars  · Social alcohol use  · No IVDA  FAMILY HISTORY:  · His father was ESRD on HD  Unclear etiology  MEDICATIONS:    Current Outpatient Medications:     amLODIPine (NORVASC) 5 mg tablet, take 1 tablet by mouth once daily, Disp: 90 tablet, Rfl: 3    escitalopram (LEXAPRO) 20 mg tablet, take 1 tablet by mouth once daily, Disp: 90 tablet, Rfl: 3    pantoprazole (PROTONIX) 40 mg tablet, TAKE 1 TABLET BY MOUTH EVERY MORNING HALF HOUR BEFORE BREAKFAST, Disp: 90 tablet, Rfl: 3    REVIEW OF SYSTEMS:  Review of Systems   Constitutional: Negative for appetite change, chills, fatigue, fever and unexpected weight change  HENT: Negative for sinus pressure and sinus pain  Eyes: Negative for visual disturbance  Respiratory: Negative for cough and shortness of breath  Cardiovascular: Negative for chest pain and leg swelling  Gastrointestinal: Negative for abdominal pain, diarrhea, nausea and vomiting     Genitourinary: Negative for dysuria and hematuria  Musculoskeletal: Negative for arthralgias  Skin: Negative for rash  Allergic/Immunologic: Negative for immunocompromised state  Neurological: Negative for dizziness and light-headedness  Hematological: Does not bruise/bleed easily  Psychiatric/Behavioral: Negative for dysphoric mood  The patient is not nervous/anxious  All the systems were reviewed and were negative except as documented on the HPI  PHYSICAL EXAMINATION:  Ht 5' 8" (1 727 m)   Wt 102 kg (225 lb)   BMI 34 21 kg/m²    Current Weight:   Body mass index is 34 21 kg/m²  Exam was limited by the quality of the video  General: conscious, coherent and not in acute distress over video  Skin: no visible rash  Eyes: appeared normal  ENT: atraumatic, appeared normal externally  Respiratory: talking in full sentences without difficulty, not in respiratory distress  CVS: could not evaluate  GI: could not evaluate  : could not evaluate  MSK: could not evaluate  Neuro: awake, alert and oriented     Psych: appropriate affect    LABORATORY RESULTS:  Results for orders placed or performed in visit on 07/01/20   Lipid Panel with Direct LDL reflex   Result Value Ref Range    Total Cholesterol 158 <200 mg/dL    HDL 37 (L) > OR = 40 mg/dL    Triglycerides 153 (H) <150 mg/dL    LDL Calculated 96 mg/dL (calc)    Chol HDLC Ratio 4 3 <5 0 (calc)    Non-HDL Cholesterol 121 <130 mg/dL (calc)   ALDOSTERONE/PLASMA RENIN ACTIVITY RATIO,LC/MS/MS   Result Value Ref Range    Aldosterone      Renin      Aldos/Renin Ratio     Comprehensive metabolic panel   Result Value Ref Range    Glucose, Random 105 (H) 65 - 99 mg/dL    BUN 14 7 - 25 mg/dL    Creatinine 1 07 0 70 - 1 33 mg/dL    eGFR Non  78 > OR = 60 mL/min/1 73m2    eGFR  90 > OR = 60 mL/min/1 73m2    SL AMB BUN/CREATININE RATIO NOT APPLICABLE 6 - 22 (calc)    Sodium 141 135 - 146 mmol/L    Potassium 4 0 3 5 - 5 3 mmol/L    Chloride 105 98 - 110 mmol/L    CO2 27 20 - 32 mmol/L    Calcium 9 0 8 6 - 10 3 mg/dL    Protein, Total 6 5 6 1 - 8 1 g/dL    Albumin 4 2 3 6 - 5 1 g/dL    Globulin 2 3 1 9 - 3 7 g/dL (calc)    Albumin/Globulin Ratio 1 8 1 0 - 2 5 (calc)    TOTAL BILIRUBIN 0 4 0 2 - 1 2 mg/dL    Alkaline Phosphatase 58 35 - 144 U/L    AST 18 10 - 35 U/L    ALT 24 9 - 46 U/L   Urinalysis with microscopic   Result Value Ref Range    Color UA YELLOW YELLOW    Urine Appearance CLEAR CLEAR    Specific Gravity 1 023 1 001 - 1 035    Ph 6 0 5 0 - 8 0    Glucose, Urine NEGATIVE NEGATIVE    Bilirubin, Urine NEGATIVE NEGATIVE    Ketone, Urine NEGATIVE NEGATIVE    Blood, Urine NEGATIVE NEGATIVE    Protein, Urine NEGATIVE NEGATIVE    SL AMB NITRITES URINE, QUAL  NEGATIVE NEGATIVE    Leukocyte Esterase NEGATIVE NEGATIVE    SL AMB WBC, URINE 0-5 < OR = 5 /HPF    RBC, Urine 0-2 < OR = 2 /HPF    Squamous Epithelial Cells NONE SEEN < OR = 5 /HPF    Bacteria, UA FEW (A) NONE SEEN /HPF    Calcium Oxalate Crystals MODERATE (A) NONE OR FEW /HPF    Hyaline Casts NONE SEEN NONE SEEN /LPF   CBC and differential   Result Value Ref Range    White Blood Cell Count 5 1 3 8 - 10 8 Thousand/uL    Red Blood Cell Count 5 09 4  20 - 5 80 Million/uL    Hemoglobin 15 2 13 2 - 17 1 g/dL    HCT 44 3 38 5 - 50 0 %    MCV 87 0 80 0 - 100 0 fL    MCH 29 9 27 0 - 33 0 pg    MCHC 34 3 32 0 - 36 0 g/dL    RDW 13 4 11 0 - 15 0 %    Platelet Count 447 184 - 400 Thousand/uL    SL AMB MPV 10 4 7 5 - 12 5 fL    Neutrophils (Absolute) 3,040 1,500 - 7,800 cells/uL    Lymphocytes (Absolute) 1,168 850 - 3,900 cells/uL    Monocytes (Absolute) 632 200 - 950 cells/uL    Eosinophils (Absolute) 199 15 - 500 cells/uL    Basophils ABS 61 0 - 200 cells/uL    Neutrophils 59 6 %    Lymphocytes 22 9 %    Monocytes 12 4 %    Eosinophils 3 9 %    Basophils PCT 1 2 %   TSH, 3rd generation   Result Value Ref Range    TSH 0 67 0 40 - 4 50 mIU/L   PSA, Total Screen   Result Value Ref Range    Prostate Specific Antigen Total 0 7 < OR = 4 0 ng/mL     IMAGIN/9/19 Renal US: R 11 5 cm, L 11 6 cm, (+) simple cysts  VIRTUAL VISIT DISCLAIMER    Sulema Eleanor acknowledges that he has consented to an online visit or consultation  He understands that the online visit is based solely on information provided by him, and that, in the absence of a face-to-face physical evaluation by the physician, the diagnosis he receives is both limited and provisional in terms of accuracy and completeness  This is not intended to replace a full medical face-to-face evaluation by the physician  Sulema Webster understands and accepts these terms

## 2020-07-05 LAB
ALBUMIN SERPL-MCNC: 4.2 G/DL (ref 3.6–5.1)
ALBUMIN/GLOB SERPL: 1.8 (CALC) (ref 1–2.5)
ALDOST SERPL-MCNC: 17 NG/DL
ALDOST/RENIN PLAS-RTO: 81 RATIO (ref 0.9–28.9)
ALP SERPL-CCNC: 58 U/L (ref 35–144)
ALT SERPL-CCNC: 24 U/L (ref 9–46)
APPEARANCE UR: CLEAR
AST SERPL-CCNC: 18 U/L (ref 10–35)
BACTERIA UR QL AUTO: ABNORMAL /HPF
BASOPHILS # BLD AUTO: 61 CELLS/UL (ref 0–200)
BASOPHILS NFR BLD AUTO: 1.2 %
BILIRUB SERPL-MCNC: 0.4 MG/DL (ref 0.2–1.2)
BILIRUB UR QL STRIP: NEGATIVE
BUN SERPL-MCNC: 14 MG/DL (ref 7–25)
BUN/CREAT SERPL: ABNORMAL (CALC) (ref 6–22)
CALCIUM SERPL-MCNC: 9 MG/DL (ref 8.6–10.3)
CAOX CRY #/AREA URNS HPF: ABNORMAL /HPF
CHLORIDE SERPL-SCNC: 105 MMOL/L (ref 98–110)
CHOLEST SERPL-MCNC: 158 MG/DL
CHOLEST/HDLC SERPL: 4.3 (CALC)
CO2 SERPL-SCNC: 27 MMOL/L (ref 20–32)
COLOR UR: YELLOW
CREAT SERPL-MCNC: 1.07 MG/DL (ref 0.7–1.33)
EOSINOPHIL # BLD AUTO: 199 CELLS/UL (ref 15–500)
EOSINOPHIL NFR BLD AUTO: 3.9 %
ERYTHROCYTE [DISTWIDTH] IN BLOOD BY AUTOMATED COUNT: 13.4 % (ref 11–15)
GLOBULIN SER CALC-MCNC: 2.3 G/DL (CALC) (ref 1.9–3.7)
GLUCOSE SERPL-MCNC: 105 MG/DL (ref 65–99)
GLUCOSE UR QL STRIP: NEGATIVE
HCT VFR BLD AUTO: 44.3 % (ref 38.5–50)
HDLC SERPL-MCNC: 37 MG/DL
HGB BLD-MCNC: 15.2 G/DL (ref 13.2–17.1)
HGB UR QL STRIP: NEGATIVE
HYALINE CASTS #/AREA URNS LPF: ABNORMAL /LPF
KETONES UR QL STRIP: NEGATIVE
LDLC SERPL CALC-MCNC: 96 MG/DL (CALC)
LEUKOCYTE ESTERASE UR QL STRIP: NEGATIVE
LYMPHOCYTES # BLD AUTO: 1168 CELLS/UL (ref 850–3900)
LYMPHOCYTES NFR BLD AUTO: 22.9 %
MCH RBC QN AUTO: 29.9 PG (ref 27–33)
MCHC RBC AUTO-ENTMCNC: 34.3 G/DL (ref 32–36)
MCV RBC AUTO: 87 FL (ref 80–100)
MONOCYTES # BLD AUTO: 632 CELLS/UL (ref 200–950)
MONOCYTES NFR BLD AUTO: 12.4 %
NEUTROPHILS # BLD AUTO: 3040 CELLS/UL (ref 1500–7800)
NEUTROPHILS NFR BLD AUTO: 59.6 %
NITRITE UR QL STRIP: NEGATIVE
NONHDLC SERPL-MCNC: 121 MG/DL (CALC)
PH UR STRIP: 6 [PH] (ref 5–8)
PLATELET # BLD AUTO: 205 THOUSAND/UL (ref 140–400)
PMV BLD REES-ECKER: 10.4 FL (ref 7.5–12.5)
POTASSIUM SERPL-SCNC: 4 MMOL/L (ref 3.5–5.3)
PROT SERPL-MCNC: 6.5 G/DL (ref 6.1–8.1)
PROT UR QL STRIP: NEGATIVE
PSA SERPL-MCNC: 0.7 NG/ML
RBC # BLD AUTO: 5.09 MILLION/UL (ref 4.2–5.8)
RBC #/AREA URNS HPF: ABNORMAL /HPF
RENIN PLAS-CCNC: 0.21 NG/ML/H (ref 0.25–5.82)
SL AMB EGFR AFRICAN AMERICAN: 90 ML/MIN/1.73M2
SL AMB EGFR NON AFRICAN AMERICAN: 78 ML/MIN/1.73M2
SODIUM SERPL-SCNC: 141 MMOL/L (ref 135–146)
SP GR UR STRIP: 1.02 (ref 1–1.03)
SQUAMOUS #/AREA URNS HPF: ABNORMAL /HPF
TRIGL SERPL-MCNC: 153 MG/DL
TSH SERPL-ACNC: 0.67 MIU/L (ref 0.4–4.5)
WBC # BLD AUTO: 5.1 THOUSAND/UL (ref 3.8–10.8)
WBC #/AREA URNS HPF: ABNORMAL /HPF

## 2020-07-08 ENCOUNTER — TELEPHONE (OUTPATIENT)
Dept: NEPHROLOGY | Facility: CLINIC | Age: 55
End: 2020-07-08

## 2020-07-08 ENCOUNTER — TELEPHONE (OUTPATIENT)
Dept: FAMILY MEDICINE CLINIC | Facility: CLINIC | Age: 55
End: 2020-07-08

## 2020-07-08 DIAGNOSIS — R79.89 HIGH ALDOSTERONE LEVEL: Primary | ICD-10-CM

## 2020-07-08 RX ORDER — SODIUM CHLORIDE 1000 MG
1 TABLET, SOLUBLE MISCELLANEOUS 3 TIMES DAILY
Qty: 12 TABLET | Refills: 0 | Status: SHIPPED | OUTPATIENT
Start: 2020-07-08 | End: 2022-05-13 | Stop reason: ALTCHOICE

## 2020-07-08 NOTE — TELEPHONE ENCOUNTER
I called the patient and explained how the 24 hour collection is   He understood I advised him the labs will also explain the day he plans on going  Mailed out scripts

## 2020-07-08 NOTE — TELEPHONE ENCOUNTER
Please contact patient  All his blood work was normal aside from abnormal ratio of aldosterone and renin  I did discuss it with Dr Jo Martin, nephrologists, and I am aware that he is proceeding with additional blood work and patient should collect 24 hour urine as advised    Thank you

## 2020-07-08 NOTE — TELEPHONE ENCOUNTER
Patient called the office he said he received a message on Monday from [facility] requesting a 24 hr urine test ?   Can someone please verify this, I don's see any recent notes after the visit on 7/2/2020

## 2020-07-08 NOTE — TELEPHONE ENCOUNTER
Yes, I called him about his aldosterone level which came back after I saw him  His aldosterone level (a hormone which causes high blood pressure) is slightly abnormal and we need to do a 24 hour urine to confirm  I would like for him to:  1  Start salt tablets 1 gm three times a day for 4 days  2  Go for a 24 hour urine for aldosterone, sodium, and creatinine on the 4th day  3  He will need to plan ahead as to when he is doing the 24 hour urine so he can start the salt tablets 3 days before  I will place the order for the salt tabs and the 24 hour urine  Please explain to him how to collect the 24 hour urine

## 2020-08-18 NOTE — TELEPHONE ENCOUNTER
Ultrasound order was placed by Dr Julia Martino  I left a message for patient to call the office back  He needs an ultrasound  Please ask patient if he would like us to schedule it for him or if he would like to schedule it himself

## 2020-08-25 NOTE — TELEPHONE ENCOUNTER
I spoke with patient regarding ultrasound  Patient will schedule the ultrasound himself  I mailed the order to Karen Davis

## 2020-09-17 ENCOUNTER — TELEPHONE (OUTPATIENT)
Dept: NEPHROLOGY | Facility: CLINIC | Age: 55
End: 2020-09-17

## 2020-09-17 NOTE — TELEPHONE ENCOUNTER
I called and left message for patient to call back to schedule November follow up appt with Dr Didi Rodriguez

## 2020-11-09 DIAGNOSIS — F41.9 ANXIETY: ICD-10-CM

## 2020-11-09 DIAGNOSIS — K21.9 CHRONIC GERD: ICD-10-CM

## 2020-11-09 DIAGNOSIS — I10 ESSENTIAL HYPERTENSION: ICD-10-CM

## 2020-11-09 RX ORDER — PANTOPRAZOLE SODIUM 40 MG/1
TABLET, DELAYED RELEASE ORAL
Qty: 90 TABLET | Refills: 3 | Status: SHIPPED | OUTPATIENT
Start: 2020-11-09 | End: 2021-12-22

## 2020-11-09 RX ORDER — AMLODIPINE BESYLATE 5 MG/1
TABLET ORAL
Qty: 90 TABLET | Refills: 3 | Status: SHIPPED | OUTPATIENT
Start: 2020-11-09 | End: 2020-12-16 | Stop reason: SDUPTHER

## 2020-11-09 RX ORDER — ESCITALOPRAM OXALATE 20 MG/1
TABLET ORAL
Qty: 90 TABLET | Refills: 3 | Status: SHIPPED | OUTPATIENT
Start: 2020-11-09 | End: 2021-11-30

## 2020-11-14 ENCOUNTER — HOSPITAL ENCOUNTER (EMERGENCY)
Facility: HOSPITAL | Age: 55
Discharge: HOME | End: 2020-11-14
Attending: EMERGENCY MEDICINE
Payer: COMMERCIAL

## 2020-11-14 ENCOUNTER — APPOINTMENT (EMERGENCY)
Dept: CT IMAGING | Facility: HOSPITAL | Age: 55
End: 2020-11-14
Payer: COMMERCIAL

## 2020-11-14 VITALS
RESPIRATION RATE: 18 BRPM | WEIGHT: 232 LBS | TEMPERATURE: 97.9 F | OXYGEN SATURATION: 99 % | HEART RATE: 72 BPM | BODY MASS INDEX: 35.28 KG/M2 | DIASTOLIC BLOOD PRESSURE: 84 MMHG | SYSTOLIC BLOOD PRESSURE: 177 MMHG

## 2020-11-14 DIAGNOSIS — N17.9 ACUTE KIDNEY INJURY (HCC): ICD-10-CM

## 2020-11-14 DIAGNOSIS — N13.30 HYDRONEPHROSIS: ICD-10-CM

## 2020-11-14 DIAGNOSIS — N20.0 KIDNEY STONE: Primary | ICD-10-CM

## 2020-11-14 LAB
ALBUMIN SERPL BCP-MCNC: 3.9 G/DL (ref 3.5–5)
ALP SERPL-CCNC: 66 U/L (ref 46–116)
ALT SERPL W P-5'-P-CCNC: 38 U/L (ref 12–78)
ANION GAP SERPL CALCULATED.3IONS-SCNC: 12 MMOL/L (ref 4–13)
AST SERPL W P-5'-P-CCNC: 13 U/L (ref 5–45)
BACTERIA UR QL AUTO: ABNORMAL /HPF
BASOPHILS # BLD AUTO: 0.07 THOUSANDS/ΜL (ref 0–0.1)
BASOPHILS NFR BLD AUTO: 1 % (ref 0–1)
BILIRUB SERPL-MCNC: 0.43 MG/DL (ref 0.2–1)
BILIRUB UR QL STRIP: NEGATIVE
BUN SERPL-MCNC: 18 MG/DL (ref 5–25)
CALCIUM SERPL-MCNC: 9.4 MG/DL (ref 8.3–10.1)
CHLORIDE SERPL-SCNC: 104 MMOL/L (ref 100–108)
CLARITY UR: CLEAR
CO2 SERPL-SCNC: 26 MMOL/L (ref 21–32)
COLOR UR: YELLOW
CREAT SERPL-MCNC: 1.64 MG/DL (ref 0.6–1.3)
EOSINOPHIL # BLD AUTO: 0.23 THOUSAND/ΜL (ref 0–0.61)
EOSINOPHIL NFR BLD AUTO: 4 % (ref 0–6)
ERYTHROCYTE [DISTWIDTH] IN BLOOD BY AUTOMATED COUNT: 12.7 % (ref 11.6–15.1)
GFR SERPL CREATININE-BSD FRML MDRD: 46 ML/MIN/1.73SQ M
GLUCOSE SERPL-MCNC: 117 MG/DL (ref 65–140)
GLUCOSE UR STRIP-MCNC: NEGATIVE MG/DL
HCT VFR BLD AUTO: 44.7 % (ref 36.5–49.3)
HGB BLD-MCNC: 15.4 G/DL (ref 12–17)
HGB UR QL STRIP.AUTO: ABNORMAL
IMM GRANULOCYTES # BLD AUTO: 0.03 THOUSAND/UL (ref 0–0.2)
IMM GRANULOCYTES NFR BLD AUTO: 1 % (ref 0–2)
KETONES UR STRIP-MCNC: NEGATIVE MG/DL
LEUKOCYTE ESTERASE UR QL STRIP: NEGATIVE
LYMPHOCYTES # BLD AUTO: 1.33 THOUSANDS/ΜL (ref 0.6–4.47)
LYMPHOCYTES NFR BLD AUTO: 21 % (ref 14–44)
MCH RBC QN AUTO: 29.8 PG (ref 26.8–34.3)
MCHC RBC AUTO-ENTMCNC: 34.5 G/DL (ref 31.4–37.4)
MCV RBC AUTO: 87 FL (ref 82–98)
MONOCYTES # BLD AUTO: 0.63 THOUSAND/ΜL (ref 0.17–1.22)
MONOCYTES NFR BLD AUTO: 10 % (ref 4–12)
MUCOUS THREADS UR QL AUTO: ABNORMAL
NEUTROPHILS # BLD AUTO: 4.08 THOUSANDS/ΜL (ref 1.85–7.62)
NEUTS SEG NFR BLD AUTO: 63 % (ref 43–75)
NITRITE UR QL STRIP: NEGATIVE
NON-SQ EPI CELLS URNS QL MICRO: ABNORMAL /HPF
NRBC BLD AUTO-RTO: 0 /100 WBCS
PH UR STRIP.AUTO: 5.5 [PH] (ref 4.5–8)
PLATELET # BLD AUTO: 215 THOUSANDS/UL (ref 149–390)
PMV BLD AUTO: 9.6 FL (ref 8.9–12.7)
POTASSIUM SERPL-SCNC: 3.4 MMOL/L (ref 3.5–5.3)
PROT SERPL-MCNC: 7.3 G/DL (ref 6.4–8.2)
PROT UR STRIP-MCNC: NEGATIVE MG/DL
RBC # BLD AUTO: 5.17 MILLION/UL (ref 3.88–5.62)
RBC #/AREA URNS AUTO: ABNORMAL /HPF
SODIUM SERPL-SCNC: 142 MMOL/L (ref 136–145)
SP GR UR STRIP.AUTO: >=1.03 (ref 1–1.03)
UROBILINOGEN UR QL STRIP.AUTO: 0.2 E.U./DL
WBC # BLD AUTO: 6.37 THOUSAND/UL (ref 4.31–10.16)
WBC #/AREA URNS AUTO: ABNORMAL /HPF

## 2020-11-14 PROCEDURE — 99284 EMERGENCY DEPT VISIT MOD MDM: CPT

## 2020-11-14 PROCEDURE — 96374 THER/PROPH/DIAG INJ IV PUSH: CPT

## 2020-11-14 PROCEDURE — 80053 COMPREHEN METABOLIC PANEL: CPT | Performed by: EMERGENCY MEDICINE

## 2020-11-14 PROCEDURE — 99284 EMERGENCY DEPT VISIT MOD MDM: CPT | Performed by: EMERGENCY MEDICINE

## 2020-11-14 PROCEDURE — 74176 CT ABD & PELVIS W/O CONTRAST: CPT

## 2020-11-14 PROCEDURE — 96361 HYDRATE IV INFUSION ADD-ON: CPT

## 2020-11-14 PROCEDURE — 81001 URINALYSIS AUTO W/SCOPE: CPT

## 2020-11-14 PROCEDURE — 85025 COMPLETE CBC W/AUTO DIFF WBC: CPT | Performed by: EMERGENCY MEDICINE

## 2020-11-14 PROCEDURE — G1004 CDSM NDSC: HCPCS

## 2020-11-14 PROCEDURE — 36415 COLL VENOUS BLD VENIPUNCTURE: CPT | Performed by: EMERGENCY MEDICINE

## 2020-11-14 RX ORDER — MORPHINE SULFATE 4 MG/ML
4 INJECTION, SOLUTION INTRAMUSCULAR; INTRAVENOUS ONCE
Status: COMPLETED | OUTPATIENT
Start: 2020-11-14 | End: 2020-11-14

## 2020-11-14 RX ORDER — TAMSULOSIN HYDROCHLORIDE 0.4 MG/1
0.4 CAPSULE ORAL
Qty: 90 CAPSULE | Refills: 0 | Status: SHIPPED | OUTPATIENT
Start: 2020-11-14 | End: 2020-11-14 | Stop reason: SDUPTHER

## 2020-11-14 RX ORDER — TAMSULOSIN HYDROCHLORIDE 0.4 MG/1
0.4 CAPSULE ORAL ONCE
Status: COMPLETED | OUTPATIENT
Start: 2020-11-14 | End: 2020-11-14

## 2020-11-14 RX ORDER — TAMSULOSIN HYDROCHLORIDE 0.4 MG/1
0.4 CAPSULE ORAL
Qty: 90 CAPSULE | Refills: 0 | Status: SHIPPED | OUTPATIENT
Start: 2020-11-14 | End: 2022-05-13 | Stop reason: ALTCHOICE

## 2020-11-14 RX ORDER — OXYCODONE HYDROCHLORIDE AND ACETAMINOPHEN 5; 325 MG/1; MG/1
1 TABLET ORAL EVERY 4 HOURS PRN
Qty: 20 TABLET | Refills: 0 | Status: SHIPPED | OUTPATIENT
Start: 2020-11-14 | End: 2020-11-24

## 2020-11-14 RX ADMIN — MORPHINE SULFATE 4 MG: 4 INJECTION INTRAVENOUS at 17:13

## 2020-11-14 RX ADMIN — SODIUM CHLORIDE 1000 ML: 0.9 INJECTION, SOLUTION INTRAVENOUS at 17:13

## 2020-11-14 RX ADMIN — TAMSULOSIN HYDROCHLORIDE 0.4 MG: 0.4 CAPSULE ORAL at 18:23

## 2020-11-16 ENCOUNTER — TELEPHONE (OUTPATIENT)
Dept: UROLOGY | Facility: MEDICAL CENTER | Age: 55
End: 2020-11-16

## 2020-11-20 ENCOUNTER — OFFICE VISIT (OUTPATIENT)
Dept: UROLOGY | Facility: AMBULATORY SURGERY CENTER | Age: 55
End: 2020-11-20
Payer: COMMERCIAL

## 2020-11-20 VITALS
BODY MASS INDEX: 36.22 KG/M2 | WEIGHT: 239 LBS | HEIGHT: 68 IN | HEART RATE: 82 BPM | DIASTOLIC BLOOD PRESSURE: 72 MMHG | SYSTOLIC BLOOD PRESSURE: 102 MMHG

## 2020-11-20 DIAGNOSIS — Z12.5 SCREENING FOR PROSTATE CANCER: ICD-10-CM

## 2020-11-20 DIAGNOSIS — N20.0 NEPHROLITHIASIS: Primary | ICD-10-CM

## 2020-11-20 PROCEDURE — 3074F SYST BP LT 130 MM HG: CPT | Performed by: NURSE PRACTITIONER

## 2020-11-20 PROCEDURE — 99204 OFFICE O/P NEW MOD 45 MIN: CPT | Performed by: NURSE PRACTITIONER

## 2020-11-20 PROCEDURE — 3078F DIAST BP <80 MM HG: CPT | Performed by: NURSE PRACTITIONER

## 2020-11-20 PROCEDURE — 3008F BODY MASS INDEX DOCD: CPT | Performed by: NURSE PRACTITIONER

## 2020-12-15 ENCOUNTER — LAB (OUTPATIENT)
Dept: LAB | Facility: CLINIC | Age: 55
End: 2020-12-15
Payer: COMMERCIAL

## 2020-12-15 ENCOUNTER — TRANSCRIBE ORDERS (OUTPATIENT)
Dept: LAB | Facility: CLINIC | Age: 55
End: 2020-12-15

## 2020-12-15 DIAGNOSIS — Z12.5 ENCOUNTER FOR PROSTATE CANCER SCREENING: ICD-10-CM

## 2020-12-15 DIAGNOSIS — N18.30 CHRONIC RENAL DISEASE, STAGE 3, MODERATELY DECREASED GLOMERULAR FILTRATION RATE BETWEEN 30-59 ML/MIN/1.73 SQUARE METER (HCC): ICD-10-CM

## 2020-12-15 DIAGNOSIS — I10 BENIGN ESSENTIAL HTN: ICD-10-CM

## 2020-12-15 LAB
25(OH)D3 SERPL-MCNC: 11 NG/ML (ref 30–100)
ALBUMIN SERPL BCP-MCNC: 3.9 G/DL (ref 3.5–5)
ALP SERPL-CCNC: 74 U/L (ref 46–116)
ALT SERPL W P-5'-P-CCNC: 32 U/L (ref 12–78)
ANION GAP SERPL CALCULATED.3IONS-SCNC: 8 MMOL/L (ref 4–13)
AST SERPL W P-5'-P-CCNC: 16 U/L (ref 5–45)
BILIRUB SERPL-MCNC: 0.47 MG/DL (ref 0.2–1)
BUN SERPL-MCNC: 14 MG/DL (ref 5–25)
CALCIUM SERPL-MCNC: 8.7 MG/DL (ref 8.3–10.1)
CHLORIDE SERPL-SCNC: 107 MMOL/L (ref 100–108)
CHOLEST SERPL-MCNC: 147 MG/DL (ref 50–200)
CO2 SERPL-SCNC: 28 MMOL/L (ref 21–32)
CREAT SERPL-MCNC: 1.21 MG/DL (ref 0.6–1.3)
ERYTHROCYTE [DISTWIDTH] IN BLOOD BY AUTOMATED COUNT: 12.4 % (ref 11.6–15.1)
GFR SERPL CREATININE-BSD FRML MDRD: 67 ML/MIN/1.73SQ M
GLUCOSE P FAST SERPL-MCNC: 121 MG/DL (ref 65–99)
HCT VFR BLD AUTO: 45.5 % (ref 36.5–49.3)
HDLC SERPL-MCNC: 30 MG/DL
HGB BLD-MCNC: 15.6 G/DL (ref 12–17)
LDLC SERPL CALC-MCNC: 56 MG/DL (ref 0–100)
MAGNESIUM SERPL-MCNC: 2.7 MG/DL (ref 1.6–2.6)
MCH RBC QN AUTO: 29.7 PG (ref 26.8–34.3)
MCHC RBC AUTO-ENTMCNC: 34.3 G/DL (ref 31.4–37.4)
MCV RBC AUTO: 87 FL (ref 82–98)
PHOSPHATE SERPL-MCNC: 3.1 MG/DL (ref 2.7–4.5)
PLATELET # BLD AUTO: 231 THOUSANDS/UL (ref 149–390)
PMV BLD AUTO: 9.7 FL (ref 8.9–12.7)
POTASSIUM SERPL-SCNC: 3.4 MMOL/L (ref 3.5–5.3)
PROT SERPL-MCNC: 7.3 G/DL (ref 6.4–8.2)
PSA SERPL-MCNC: 0.7 NG/ML (ref 0–4)
PTH-INTACT SERPL-MCNC: 107.1 PG/ML (ref 18.4–80.1)
RBC # BLD AUTO: 5.26 MILLION/UL (ref 3.88–5.62)
SODIUM SERPL-SCNC: 143 MMOL/L (ref 136–145)
TRIGL SERPL-MCNC: 303 MG/DL
TSH SERPL DL<=0.05 MIU/L-ACNC: 0.67 UIU/ML (ref 0.36–3.74)
WBC # BLD AUTO: 6.72 THOUSAND/UL (ref 4.31–10.16)

## 2020-12-15 PROCEDURE — 36415 COLL VENOUS BLD VENIPUNCTURE: CPT

## 2020-12-15 PROCEDURE — 84100 ASSAY OF PHOSPHORUS: CPT

## 2020-12-15 PROCEDURE — 80061 LIPID PANEL: CPT

## 2020-12-15 PROCEDURE — 80053 COMPREHEN METABOLIC PANEL: CPT

## 2020-12-15 PROCEDURE — 83735 ASSAY OF MAGNESIUM: CPT

## 2020-12-15 PROCEDURE — 84443 ASSAY THYROID STIM HORMONE: CPT

## 2020-12-15 PROCEDURE — 83970 ASSAY OF PARATHORMONE: CPT

## 2020-12-15 PROCEDURE — 82306 VITAMIN D 25 HYDROXY: CPT

## 2020-12-15 PROCEDURE — G0103 PSA SCREENING: HCPCS

## 2020-12-15 PROCEDURE — 85027 COMPLETE CBC AUTOMATED: CPT

## 2020-12-16 ENCOUNTER — TELEMEDICINE (OUTPATIENT)
Dept: NEPHROLOGY | Facility: CLINIC | Age: 55
End: 2020-12-16
Payer: COMMERCIAL

## 2020-12-16 DIAGNOSIS — E87.6 HYPOKALEMIA: ICD-10-CM

## 2020-12-16 DIAGNOSIS — I12.9 BENIGN HYPERTENSION WITH CKD (CHRONIC KIDNEY DISEASE) STAGE III (HCC): ICD-10-CM

## 2020-12-16 DIAGNOSIS — M89.9 CHRONIC KIDNEY DISEASE-MINERAL AND BONE DISORDER: ICD-10-CM

## 2020-12-16 DIAGNOSIS — I10 ESSENTIAL HYPERTENSION: ICD-10-CM

## 2020-12-16 DIAGNOSIS — E55.9 VITAMIN D DEFICIENCY: ICD-10-CM

## 2020-12-16 DIAGNOSIS — E83.9 CHRONIC KIDNEY DISEASE-MINERAL AND BONE DISORDER: ICD-10-CM

## 2020-12-16 DIAGNOSIS — N18.31 STAGE 3A CHRONIC KIDNEY DISEASE (HCC): Primary | ICD-10-CM

## 2020-12-16 DIAGNOSIS — N18.9 CHRONIC KIDNEY DISEASE-MINERAL AND BONE DISORDER: ICD-10-CM

## 2020-12-16 DIAGNOSIS — N18.30 BENIGN HYPERTENSION WITH CKD (CHRONIC KIDNEY DISEASE) STAGE III (HCC): ICD-10-CM

## 2020-12-16 PROCEDURE — 99214 OFFICE O/P EST MOD 30 MIN: CPT | Performed by: INTERNAL MEDICINE

## 2020-12-16 RX ORDER — AMLODIPINE BESYLATE 5 MG/1
5 TABLET ORAL 2 TIMES DAILY
Qty: 180 TABLET | Refills: 3 | Status: SHIPPED | OUTPATIENT
Start: 2020-12-16 | End: 2021-01-14 | Stop reason: SDUPTHER

## 2020-12-16 RX ORDER — ERGOCALCIFEROL (VITAMIN D2) 1250 MCG
50000 CAPSULE ORAL WEEKLY
Qty: 16 CAPSULE | Refills: 1 | Status: SHIPPED | OUTPATIENT
Start: 2020-12-16 | End: 2022-05-13 | Stop reason: ALTCHOICE

## 2020-12-16 RX ORDER — POTASSIUM CHLORIDE 20 MEQ/1
20 TABLET, EXTENDED RELEASE ORAL DAILY
Qty: 90 TABLET | Refills: 1 | Status: SHIPPED | OUTPATIENT
Start: 2020-12-16 | End: 2022-05-13 | Stop reason: ALTCHOICE

## 2020-12-21 ENCOUNTER — TELEPHONE (OUTPATIENT)
Dept: FAMILY MEDICINE CLINIC | Facility: CLINIC | Age: 55
End: 2020-12-21

## 2020-12-21 DIAGNOSIS — R73.02 IGT (IMPAIRED GLUCOSE TOLERANCE): Primary | ICD-10-CM

## 2020-12-21 DIAGNOSIS — E78.5 DYSLIPIDEMIA: ICD-10-CM

## 2021-01-14 DIAGNOSIS — I10 ESSENTIAL HYPERTENSION: ICD-10-CM

## 2021-01-14 RX ORDER — AMLODIPINE BESYLATE 5 MG/1
5 TABLET ORAL 2 TIMES DAILY
Qty: 180 TABLET | Refills: 3 | Status: SHIPPED | OUTPATIENT
Start: 2021-01-14 | End: 2022-01-17 | Stop reason: SDUPTHER

## 2021-03-03 ENCOUNTER — TELEPHONE (OUTPATIENT)
Dept: NEPHROLOGY | Facility: CLINIC | Age: 56
End: 2021-03-03

## 2021-03-08 ENCOUNTER — HOSPITAL ENCOUNTER (EMERGENCY)
Facility: HOSPITAL | Age: 56
Discharge: HOME | End: 2021-03-08
Attending: EMERGENCY MEDICINE | Admitting: EMERGENCY MEDICINE
Payer: COMMERCIAL

## 2021-03-08 ENCOUNTER — APPOINTMENT (EMERGENCY)
Dept: CT IMAGING | Facility: HOSPITAL | Age: 56
End: 2021-03-08
Payer: COMMERCIAL

## 2021-03-08 VITALS
HEART RATE: 80 BPM | DIASTOLIC BLOOD PRESSURE: 83 MMHG | RESPIRATION RATE: 18 BRPM | OXYGEN SATURATION: 94 % | SYSTOLIC BLOOD PRESSURE: 127 MMHG | TEMPERATURE: 98 F

## 2021-03-08 DIAGNOSIS — K52.9 ENTERITIS: Primary | ICD-10-CM

## 2021-03-08 LAB
ALBUMIN SERPL BCP-MCNC: 3.3 G/DL (ref 3.5–5)
ALP SERPL-CCNC: 40 U/L (ref 46–116)
ALT SERPL W P-5'-P-CCNC: 29 U/L (ref 12–78)
ANION GAP SERPL CALCULATED.3IONS-SCNC: 7 MMOL/L (ref 4–13)
AST SERPL W P-5'-P-CCNC: 14 U/L (ref 5–45)
BASOPHILS # BLD AUTO: 0.03 THOUSANDS/ΜL (ref 0–0.1)
BASOPHILS NFR BLD AUTO: 1 % (ref 0–1)
BILIRUB SERPL-MCNC: 0.76 MG/DL (ref 0.2–1)
BUN SERPL-MCNC: 18 MG/DL (ref 5–25)
CALCIUM ALBUM COR SERPL-MCNC: 8.9 MG/DL (ref 8.3–10.1)
CALCIUM SERPL-MCNC: 8.3 MG/DL (ref 8.3–10.1)
CHLORIDE SERPL-SCNC: 103 MMOL/L (ref 100–108)
CO2 SERPL-SCNC: 27 MMOL/L (ref 21–32)
CREAT SERPL-MCNC: 1.75 MG/DL (ref 0.6–1.3)
EOSINOPHIL # BLD AUTO: 0.11 THOUSAND/ΜL (ref 0–0.61)
EOSINOPHIL NFR BLD AUTO: 2 % (ref 0–6)
ERYTHROCYTE [DISTWIDTH] IN BLOOD BY AUTOMATED COUNT: 12.6 % (ref 11.6–15.1)
GFR SERPL CREATININE-BSD FRML MDRD: 43 ML/MIN/1.73SQ M
GLUCOSE SERPL-MCNC: 157 MG/DL (ref 65–140)
HCT VFR BLD AUTO: 45.9 % (ref 36.5–49.3)
HGB BLD-MCNC: 15.5 G/DL (ref 12–17)
IMM GRANULOCYTES # BLD AUTO: 0.03 THOUSAND/UL (ref 0–0.2)
IMM GRANULOCYTES NFR BLD AUTO: 1 % (ref 0–2)
LIPASE SERPL-CCNC: 114 U/L (ref 73–393)
LYMPHOCYTES # BLD AUTO: 0.63 THOUSANDS/ΜL (ref 0.6–4.47)
LYMPHOCYTES NFR BLD AUTO: 10 % (ref 14–44)
MCH RBC QN AUTO: 28.9 PG (ref 26.8–34.3)
MCHC RBC AUTO-ENTMCNC: 33.8 G/DL (ref 31.4–37.4)
MCV RBC AUTO: 86 FL (ref 82–98)
MONOCYTES # BLD AUTO: 0.67 THOUSAND/ΜL (ref 0.17–1.22)
MONOCYTES NFR BLD AUTO: 11 % (ref 4–12)
NEUTROPHILS # BLD AUTO: 4.75 THOUSANDS/ΜL (ref 1.85–7.62)
NEUTS SEG NFR BLD AUTO: 75 % (ref 43–75)
NRBC BLD AUTO-RTO: 0 /100 WBCS
PLATELET # BLD AUTO: 202 THOUSANDS/UL (ref 149–390)
PMV BLD AUTO: 9.6 FL (ref 8.9–12.7)
POTASSIUM SERPL-SCNC: 3.1 MMOL/L (ref 3.5–5.3)
PROT SERPL-MCNC: 6.8 G/DL (ref 6.4–8.2)
RBC # BLD AUTO: 5.36 MILLION/UL (ref 3.88–5.62)
SODIUM SERPL-SCNC: 137 MMOL/L (ref 136–145)
WBC # BLD AUTO: 6.22 THOUSAND/UL (ref 4.31–10.16)

## 2021-03-08 PROCEDURE — 85025 COMPLETE CBC W/AUTO DIFF WBC: CPT | Performed by: EMERGENCY MEDICINE

## 2021-03-08 PROCEDURE — 93005 ELECTROCARDIOGRAM TRACING: CPT

## 2021-03-08 PROCEDURE — 36415 COLL VENOUS BLD VENIPUNCTURE: CPT | Performed by: EMERGENCY MEDICINE

## 2021-03-08 PROCEDURE — 99284 EMERGENCY DEPT VISIT MOD MDM: CPT

## 2021-03-08 PROCEDURE — 96365 THER/PROPH/DIAG IV INF INIT: CPT

## 2021-03-08 PROCEDURE — 99285 EMERGENCY DEPT VISIT HI MDM: CPT | Performed by: EMERGENCY MEDICINE

## 2021-03-08 PROCEDURE — 83690 ASSAY OF LIPASE: CPT | Performed by: EMERGENCY MEDICINE

## 2021-03-08 PROCEDURE — 96375 TX/PRO/DX INJ NEW DRUG ADDON: CPT

## 2021-03-08 PROCEDURE — 74176 CT ABD & PELVIS W/O CONTRAST: CPT

## 2021-03-08 PROCEDURE — 80053 COMPREHEN METABOLIC PANEL: CPT | Performed by: EMERGENCY MEDICINE

## 2021-03-08 RX ORDER — NEBIVOLOL 5 MG/1
5 TABLET ORAL DAILY
COMMUNITY
End: 2021-04-20

## 2021-03-08 RX ORDER — ONDANSETRON 4 MG/1
8 TABLET, ORALLY DISINTEGRATING ORAL EVERY 8 HOURS PRN
Qty: 10 TABLET | Refills: 3 | Status: SHIPPED | OUTPATIENT
Start: 2021-03-08 | End: 2022-05-13 | Stop reason: ALTCHOICE

## 2021-03-08 RX ORDER — MORPHINE SULFATE 4 MG/ML
4 INJECTION, SOLUTION INTRAMUSCULAR; INTRAVENOUS ONCE
Status: COMPLETED | OUTPATIENT
Start: 2021-03-08 | End: 2021-03-08

## 2021-03-08 RX ADMIN — SODIUM CHLORIDE, SODIUM LACTATE, POTASSIUM CHLORIDE, AND CALCIUM CHLORIDE 500 ML: .6; .31; .03; .02 INJECTION, SOLUTION INTRAVENOUS at 17:25

## 2021-03-08 RX ADMIN — MORPHINE SULFATE 4 MG: 4 INJECTION INTRAVENOUS at 20:12

## 2021-03-08 NOTE — ED PROCEDURE NOTE
PROCEDURE  ECG 12 Lead Documentation Only    Date/Time: 3/8/2021 5:23 PM  Performed by: Cedric Marquez MD  Authorized by: Cedric Marquez MD     Indications / Diagnosis:  Gen abd pain- nausea  ECG reviewed by me, the ED Provider: yes    Patient location:  ED  Previous ECG:     Previous ECG:  Unavailable    Comparison to cardiac monitor: Yes    Interpretation:     Interpretation: non-specific    Rate:     ECG rate:  87    ECG rate assessment: normal    Rhythm:     Rhythm: sinus rhythm    Ectopy:     Ectopy: none    QRS:     QRS axis:  Left    QRS intervals:  Normal  Conduction:     Conduction: abnormal      Abnormal conduction: incomplete RBBB    ST segments:     ST segments:  Normal  T waves:     T waves: flattening      Flattening:  I, aVL, V1 and V2  Q waves:     Q waves:  II, III, aVF and V3  Other findings:     Other findings: poor R wave progression and U wave    Comments:      No ecg signs of ischemia/ injury          Cedric Maruqez MD  03/08/21 [id number]

## 2021-03-08 NOTE — ED PROVIDER NOTES
History  Chief Complaint   Patient presents with    Abdominal Pain     pt reporta abd pain x 2 days    Diarrhea     x 2 days     63 yr male c/o approx 2 days of gen abd pain/ bloating-- with approx 2 episodes of diarrhea per hr while awake over last 2 days- pos nausea- no vomitus- fever to 102 last night-- no uri/cough- ill contacts-- no travel/ new meds/ diet chagne- recent antibx- no gu comps- no recent gerd/dsypepsia/ progressive dysphagia/ weight loss- no ruq pain  Rad to back after meals- no gen abd pain  After measl -followed by v/d- or any intestinal anginal symptoms      History provided by:  Patient and spouse   used: No    Abdominal Pain  Pain location:  Generalized  Pain quality: bloating    Pain radiates to:  Does not radiate  Associated symptoms: diarrhea, fever and nausea    Associated symptoms: no chills, no constipation, no fatigue and no vomiting    Diarrhea  Associated symptoms: abdominal pain and fever    Associated symptoms: no chills, no diaphoresis and no vomiting        Prior to Admission Medications   Prescriptions Last Dose Informant Patient Reported? Taking?    amLODIPine (NORVASC) 5 mg tablet 3/8/2021 at 0900  No Yes   Sig: Take 1 tablet (5 mg total) by mouth 2 (two) times a day   ergocalciferol (ERGOCALCIFEROL) 1 25 MG (69478 UT) capsule Not Taking at Unknown time  No No   Sig: Take 1 capsule (50,000 Units total) by mouth once a week   Patient not taking: Reported on 3/8/2021   escitalopram (LEXAPRO) 20 mg tablet  at 0900 Self No Yes   Sig: take 1 tablet by mouth once daily   nebivolol (BYSTOLIC) 5 mg tablet  at 0900  Yes Yes   Sig: Take 5 mg by mouth daily   pantoprazole (PROTONIX) 40 mg tablet  at 0900 Self No Yes   Sig: take 1 tablet by mouth every morning 1/2 HOUR before breakfast   potassium chloride (K-DUR,KLOR-CON) 20 mEq tablet Not Taking at Unknown time  No No   Sig: Take 1 tablet (20 mEq total) by mouth daily   Patient not taking: Reported on 3/8/2021 sodium chloride 1 g tablet   No No   Sig: Take 1 tablet (1 g total) by mouth 3 (three) times a day for 4 days   Patient not taking: Reported on 11/20/2020   tamsulosin (FLOMAX) 0 4 mg Not Taking at Unknown time Self No No   Sig: Take 1 capsule (0 4 mg total) by mouth daily with dinner   Patient not taking: Reported on 3/8/2021      Facility-Administered Medications: None       History reviewed  No pertinent past medical history  Past Surgical History:   Procedure Laterality Date    VASECTOMY      Surgery Vas Deferens Vasectomy       Family History   Problem Relation Age of Onset    Diabetes Father         Diabetes Mellitus    Hypertension Father     Breast cancer Sister      I have reviewed and agree with the history as documented  E-Cigarette/Vaping    E-Cigarette Use Never User      E-Cigarette/Vaping Substances    Nicotine No     THC No     CBD No     Flavoring No     Other No     Unknown No      Social History     Tobacco Use    Smoking status: Current Some Day Smoker    Smokeless tobacco: Never Used   Substance Use Topics    Alcohol use: Yes     Comment: social    Drug use: No       Review of Systems   Constitutional: Positive for activity change and fever  Negative for appetite change, chills, diaphoresis, fatigue and unexpected weight change  HENT: Negative  Eyes: Negative  Respiratory: Negative  Cardiovascular: Negative  Gastrointestinal: Positive for abdominal pain, diarrhea and nausea  Negative for abdominal distention, anal bleeding, blood in stool, constipation, rectal pain and vomiting  Endocrine: Negative  Genitourinary: Negative  Musculoskeletal: Negative  Skin: Negative  Allergic/Immunologic: Negative  Neurological: Negative  Hematological: Negative  Psychiatric/Behavioral: Negative  Physical Exam  Physical Exam  Vitals signs and nursing note reviewed  Constitutional:       General: He is not in acute distress       Appearance: He is well-developed  He is not ill-appearing, toxic-appearing or diaphoretic  Comments: avss-- pulse ox  98 % on ra- interpretation is normal- no intervention- well appearing- in nad    HENT:      Head: Normocephalic and atraumatic  Eyes:      General: No scleral icterus  Extraocular Movements: Extraocular movements intact  Pupils: Pupils are equal, round, and reactive to light  Comments: Mm pink   Cardiovascular:      Rate and Rhythm: Normal rate and regular rhythm  Heart sounds: Normal heart sounds  No murmur  No friction rub  No gallop  Comments: Equal bilateral radial/dp pulses- no ble edema/calf tenderness/asym/ erythema  Pulmonary:      Effort: Pulmonary effort is normal  No respiratory distress  Breath sounds: Normal breath sounds  No stridor  No wheezing, rhonchi or rales  Chest:      Chest wall: No tenderness  Abdominal:      General: Bowel sounds are normal  There is no distension or abdominal bruit  There are no signs of injury  Palpations: Abdomen is soft  There is no shifting dullness, fluid wave, hepatomegaly, splenomegaly, mass or pulsatile mass  Tenderness: There is generalized abdominal tenderness  There is no right CVA tenderness, left CVA tenderness, guarding or rebound  Negative signs include Ferrer's sign, Rovsing's sign, McBurney's sign, psoas sign and obturator sign  Hernia: A hernia is present  Hernia is present in the umbilical area  There is no hernia in the ventral area, left inguinal area, right femoral area, left femoral area or right inguinal area  Comments: Mild gen abd tenderness to palpation-  No peritoneal signs- no cva tenderness- no pulsatile abd mass/bruit/tendenress-- pos umbilical hernia with tenderness- over umbilical oinly   Genitourinary:     Penis: Normal        Scrotum/Testes: Normal          Right: Mass, tenderness or swelling not present  Left: Mass, tenderness or swelling not present     Skin:     General: Skin is warm  Coloration: Skin is not cyanotic, jaundiced, mottled or pale  Findings: No erythema or rash  Neurological:      General: No focal deficit present  Mental Status: He is alert and oriented to person, place, and time  Cranial Nerves: No cranial nerve deficit  Motor: No weakness  Comments: Normal non focal neuro exam    Psychiatric:         Mood and Affect: Mood normal  Mood is not anxious           Behavior: Behavior normal          Vital Signs  ED Triage Vitals [03/08/21 1651]   Temperature Pulse Respirations Blood Pressure SpO2   98 °F (36 7 °C) 97 18 136/89 98 %      Temp src Heart Rate Source Patient Position - Orthostatic VS BP Location FiO2 (%)   -- Monitor Sitting Left arm --      Pain Score       No Pain           Vitals:    03/08/21 1651   BP: 136/89   Pulse: 97   Patient Position - Orthostatic VS: Sitting         Visual Acuity      ED Medications  Medications   lactated ringers bolus 500 mL (has no administration in time range)       Diagnostic Studies  Results Reviewed     Procedure Component Value Units Date/Time    CBC and differential [[id number]]     Lab Status: No result Specimen: Blood     Comprehensive metabolic panel [[id number]]     Lab Status: No result Specimen: Blood     Lipase [[id number]]     Lab Status: No result Specimen: Blood                  No orders to display              Procedures  Procedures         ED Course  ED Course as of Mar 09 0058   Mon Mar 08, 2021   1719 Er md note- pt- offered pain meds refuses at this point - will continue to - re-eval      1759 Er md note- all current labs reviewed and comapred to previous by er md       1808 Er md note- pt- re-evaluated-  on repeat abd exam- still with mild diffuse tenderness-  but no peritoneal signs-  discuss ct scan  of abd/pelvis non contrast-  will order      65 - er md note- p reviewed ct scan findings to pt and wife-- pt c/o diffuse abvd pain and would now like something for pain- will order low dose iv morphine and start poi'-s and re-eval  and plan for d/ c       2111 - ER MD NOTE- PT- RE-EVALUATED- FEELS IMPROVED- TOLERATED PO CHALLENGE IN THE ER WITH NO COMPLAINTS- SHARON PECK D/C                                              MDM    Disposition  Final diagnoses:   None     ED Disposition     None      Follow-up Information    None         Patient's Medications   Discharge Prescriptions    No medications on file     No discharge procedures on file      PDMP Review     None          ED Provider  Electronically Signed by           Felton Rodriguez MD  03/09/21 [id number]

## 2021-03-09 LAB
ATRIAL RATE: 90 BPM
P AXIS: 54 DEGREES
PR INTERVAL: 164 MS
QRS AXIS: -70 DEGREES
QRSD INTERVAL: 88 MS
QT INTERVAL: 356 MS
QTC INTERVAL: 429 MS
T WAVE AXIS: 76 DEGREES
VENTRICULAR RATE: 87 BPM

## 2021-03-09 PROCEDURE — 93010 ELECTROCARDIOGRAM REPORT: CPT | Performed by: INTERNAL MEDICINE

## 2021-03-09 NOTE — DISCHARGE INSTRUCTIONS
DIAGNOSIS; ENTERITIS- A DIARRHEAL ILLNESS     - USUALLY SELF LIMITING CAN LAST FOR Days to a week     - for any abdominal cramping- bloating- over the counter tylenol 500 mg every 4 hrs     - for nausea- zofran 2 tablets dissolve in the mouth  every 6-8 hrs as needed     - for diarrhea- over the counter imodium ad as directed     - please bring back stool sample  in cup to outpatient lab for testing    - please call your primary doctor tomorrow to schedule an appointment for a recheck within 1 week     - please return to  the er for any persistent fevers- temp > 100 4/ any persistent vomiting with the inability to keep anything down by mouth / any bloody/ Coffee ground vomitus/ any worsening/ intractable abdominal pain/ any bloody /black tarry stools

## 2021-03-25 ENCOUNTER — TELEMEDICINE (OUTPATIENT)
Dept: FAMILY MEDICINE CLINIC | Facility: CLINIC | Age: 56
End: 2021-03-25
Payer: COMMERCIAL

## 2021-03-25 VITALS — HEIGHT: 68 IN | BODY MASS INDEX: 36.22 KG/M2 | WEIGHT: 239 LBS

## 2021-03-25 DIAGNOSIS — N18.30 BENIGN HYPERTENSION WITH CKD (CHRONIC KIDNEY DISEASE) STAGE III (HCC): ICD-10-CM

## 2021-03-25 DIAGNOSIS — S39.011D STRAIN OF ABDOMINAL MUSCLE, SUBSEQUENT ENCOUNTER: Primary | ICD-10-CM

## 2021-03-25 DIAGNOSIS — K42.9 UMBILICAL HERNIA WITHOUT OBSTRUCTION AND WITHOUT GANGRENE: ICD-10-CM

## 2021-03-25 DIAGNOSIS — I12.9 BENIGN HYPERTENSION WITH CKD (CHRONIC KIDNEY DISEASE) STAGE III (HCC): ICD-10-CM

## 2021-03-25 PROCEDURE — 99214 OFFICE O/P EST MOD 30 MIN: CPT | Performed by: FAMILY MEDICINE

## 2021-03-25 RX ORDER — METHOCARBAMOL 750 MG/1
750 TABLET, FILM COATED ORAL 2 TIMES DAILY PRN
Qty: 60 TABLET | Refills: 0 | Status: SHIPPED | OUTPATIENT
Start: 2021-03-25 | End: 2022-05-13 | Stop reason: ALTCHOICE

## 2021-03-25 NOTE — PROGRESS NOTES
Virtual Regular Visit      Assessment/Plan:    Problem List Items Addressed This Visit        Cardiovascular and Mediastinum    Benign hypertension with CKD (chronic kidney disease) stage III     Lab Results   Component Value Date    EGFR 43 03/08/2021    EGFR 67 12/15/2020    EGFR 46 11/14/2020    CREATININE 1 75 (H) 03/08/2021    CREATININE 1 21 12/15/2020    CREATININE 1 64 (H) 11/14/2020 ·   repeat BMP         Relevant Orders    Basic metabolic panel      Other Visit Diagnoses     Strain of abdominal muscle, subsequent encounter    -  Primary    Relevant Medications    methocarbamol (ROBAXIN) 750 mg tablet    Umbilical hernia without obstruction and without gangrene        Relevant Orders    Ambulatory referral to General Surgery       patient presents for video visit after emergency room evaluation on March 8th  He presented to emergency room with symptoms of fever, nausea vomiting as well as lower abdominal discomfort  Patient lifted very heavy cart few days prior to emergency room visit  ED workup included blood work and CT abdomen and pelvis consistent with enteritis  Blood work indicated elevated creatinine of 1 75 with hypokalemia at 3 1  Patient reports that nausea, vomiting and fever have completely resolved  His appetite is back to normal, he admits to normal urination and bowel movement  He is complaining of persistent lower abdominal /groin pain described as pulling discomfort  Patient feels that his lower abdominal wall is ' swollen"  His discomfort seems to be localized around umbilical hernia  I cannot exclude component of abdominal wall strain as well as discomfort due to hernia  Patient understands  limitation of today's  assessmentexam due to video visit chosen by patient   I explained patient that I am basing my decisions on emergency room examination and results of CT as well as observation of umbilical hernia on video exam today    Plan:  ·   Patient should schedule evaluation with General surgery ASAP  ·  patient should avoid anti-inflammatories due to chronic renal insufficiency  Since he is likely experiencing symptoms of abdominal wall strain - I advised him to try Robaxin on an as-needed basis  ·   Patient should proceed with repeat BMP to reassess elevated creatinine and low potassium level detected in emergency room  Patient admits to normal appetite and fluid intake  ·   I strongly advised patient to proceed with COVID-19 vaccination  ·  incidental finding of degenerative changes in lumbar spine by CT, patient's pain also could be representing component of sciatica  Patient denies any low back pain per se  ·  I advised patient to follow-up if his symptoms are not improving            Reason for visit is   Chief Complaint   Patient presents with    Follow-up     ER f/u     Virtual Regular Visit        Encounter provider Sp Velazquez MD    Provider located at [address] [place] [address]  97       Recent Visits  No visits were found meeting these conditions  Showing recent visits within past 7 days and meeting all other requirements     Today's Visits  Date Type Provider Dept   03/25/21 Telemedicine Sp Velazquez MD [id number] today's visits and meeting all other requirements     Future Appointments  No visits were found meeting these conditions  Showing future appointments within next 150 days and meeting all other requirements        The patient was identified by name and date of birth  Farzad Dickey was informed that this is a telemedicine visit and that the visit is being conducted through [address] [place] [address] and patient was informed that this is not a secure, HIPAA-compliant platform  He agrees to proceed     My office door was closed  No one else was in the room  He acknowledged consent and understanding of privacy and security of the video platform   The patient has agreed to participate and understands they can discontinue the visit at any time  Patient is aware this is a billable service  El Diaz is a 63 y o  male    Patient presents for video exam after emergency room visit on March 8th  His presenting complaint was abdominal pain, nausea , vomiting and  fever  Reportedly, patient moved very heavy cart over 100 lb on March 4th  Cart was loaded with cases of beer and ice and was extremely heavy  Patient has noticed ongoing lower abdominal discomfort and pulling sensation as well as bulging of umbilical hernia since that day  On March 5th he has developed symptoms of fever, nausea and vomiting  Bowel movements were normal   Patient did experience some numbness in his penis and scrotum  He was able to urinate with no difficulty but has noticed some dribbling  Patient did feel that lower abdominal pain was rather positional   He had to hold his lower abdomen to urinate due to discomfort  Patient was evaluated in the ER and had CT abdomen and pelvis revealing mildly distended loops of small bowel with air-fluid levels throughout the abdomen  No evidence of colitis or diverticulitis  Stable periumbilical 2 cm fat containing hernia  Emergency room notes indicate that on exam patient had mild generalized abdominal tenderness with no peritoneal signs and no CVA tenderness as well as no pulsatile abdominal mass, bruit or tenderness  Patient was diagnosed with enteritis and was discharged from ED with advise of supportive care  Patient states that his nausea vomiting and fever have completely resolved  His appetite is normal   He denies symptoms of diarrhea or constipation  He is experiencing ongoing generalized pulling discomfort in his lower abdomen, left worse than right, as well as tenderness around umbilical hernia  Patient feels that his lower abdominal discomfort is better while laying on the side    He suspects that his symptoms could be musculoskeletal after abdominal wall strain with heavy lifting few days prior  patient denies any symptoms of lower abdominal pain or sciatica symptoms  No saddle paresthesias  His urination is normal       Emergency room performed blood work with creatinine of 1 75  Patient's previous baseline in December of 2020 was 1 2  Potassium level was decreased at 3 1  CBC and lipase were normal   Patient has not repeated blood work since  Emergency room ordered stool testing for C diff and cultures, patient did not proceed with those tests  as his diarrhea has resolved  CT abdomen and pelvis March 8, 2021, impression:  IMPRESSION:  Findings compatible with enteritis  No evidence of bowel obstruction, colitis or diverticulitis             History reviewed  No pertinent past medical history      Past Surgical History:   Procedure Laterality Date    VASECTOMY      Surgery Vas Deferens Vasectomy       Current Outpatient Medications   Medication Sig Dispense Refill    amLODIPine (NORVASC) 5 mg tablet Take 1 tablet (5 mg total) by mouth 2 (two) times a day 180 tablet 3    escitalopram (LEXAPRO) 20 mg tablet take 1 tablet by mouth once daily 90 tablet 3    nebivolol (BYSTOLIC) 5 mg tablet Take 5 mg by mouth daily      ondansetron (Zofran ODT) 4 mg disintegrating tablet Take 2 tablets (8 mg total) by mouth every 8 (eight) hours as needed for nausea or vomiting for up to 10 doses 10 tablet 3    pantoprazole (PROTONIX) 40 mg tablet take 1 tablet by mouth every morning 1/2 HOUR before breakfast 90 tablet 3    ergocalciferol (ERGOCALCIFEROL) 1 25 MG (10856 UT) capsule Take 1 capsule (50,000 Units total) by mouth once a week (Patient not taking: Reported on 3/8/2021) 16 capsule 1    methocarbamol (ROBAXIN) 750 mg tablet Take 1 tablet (750 mg total) by mouth 2 (two) times a day as needed for muscle spasms 60 tablet 0    potassium chloride (K-DUR,KLOR-CON) 20 mEq tablet Take 1 tablet (20 mEq total) by mouth daily (Patient not taking: Reported on 3/8/2021) 90 tablet 1    sodium chloride 1 g tablet Take 1 tablet (1 g total) by mouth 3 (three) times a day for 4 days (Patient not taking: Reported on 11/20/2020) 12 tablet 0    tamsulosin (FLOMAX) 0 4 mg Take 1 capsule (0 4 mg total) by mouth daily with dinner (Patient not taking: Reported on 3/8/2021) 90 capsule 0     No current facility-administered medications for this visit  No Known Allergies    Review of Systems   Constitutional: Negative  HENT: Negative  Eyes: Negative  Respiratory: Negative  Cardiovascular: Negative  Gastrointestinal: Positive for abdominal pain  Negative for diarrhea, nausea and vomiting  Endocrine: Negative  Genitourinary: Negative  Musculoskeletal: Positive for myalgias  Skin: Negative  Allergic/Immunologic: Negative  Neurological: Negative  Hematological: Negative  Psychiatric/Behavioral: Negative  Video Exam    Vitals:    03/25/21 1338   Weight: 108 kg (239 lb)   Height: 5' 8" (1 727 m)       Physical Exam  Constitutional:       General: He is not in acute distress  Appearance: Normal appearance  He is not ill-appearing  HENT:      Head: Normocephalic and atraumatic  Pulmonary:      Effort: Pulmonary effort is normal  No respiratory distress  Comments: Unlabored breathing  No tachypnea, cough or wheezing throughout exam   Patient is able to complete full sentences without cough  Abdominal:      Hernia: A hernia (  Umbilical hernia) is present  Neurological:      General: No focal deficit present  Mental Status: He is alert and oriented to person, place, and time  Psychiatric:         Mood and Affect: Mood normal          Behavior: Behavior normal           I spent 25 minutes directly with the patient during this visit      VIRTUAL VISIT DISCLAIMER    Maria Alejandra Garcia acknowledges that he has consented to an online visit or consultation   He understands that the online visit is based solely on information provided by him, and that, in the absence of a face-to-face physical evaluation by the physician, the diagnosis he receives is both limited and provisional in terms of accuracy and completeness  This is not intended to replace a full medical face-to-face evaluation by the physician  Huyen Collado understands and accepts these terms

## 2021-03-25 NOTE — ASSESSMENT & PLAN NOTE
Lab Results   Component Value Date    EGFR 43 03/08/2021    EGFR 67 12/15/2020    EGFR 46 11/14/2020    CREATININE 1 75 (H) 03/08/2021    CREATININE 1 21 12/15/2020    CREATININE 1 64 (H) 11/14/2020   · repeat BMP

## 2021-03-29 ENCOUNTER — IMMUNIZATIONS (OUTPATIENT)
Dept: FAMILY MEDICINE CLINIC | Facility: HOSPITAL | Age: 56
End: 2021-03-29

## 2021-03-29 DIAGNOSIS — Z23 ENCOUNTER FOR IMMUNIZATION: Primary | ICD-10-CM

## 2021-03-29 PROCEDURE — 91300 SARS-COV-2 / COVID-19 MRNA VACCINE (PFIZER-BIONTECH) 30 MCG: CPT

## 2021-03-29 PROCEDURE — 0001A SARS-COV-2 / COVID-19 MRNA VACCINE (PFIZER-BIONTECH) 30 MCG: CPT

## 2021-04-13 ENCOUNTER — LAB (OUTPATIENT)
Dept: LAB | Facility: CLINIC | Age: 56
End: 2021-04-13
Payer: COMMERCIAL

## 2021-04-13 ENCOUNTER — OFFICE VISIT (OUTPATIENT)
Dept: SURGERY | Facility: CLINIC | Age: 56
End: 2021-04-13
Payer: COMMERCIAL

## 2021-04-13 VITALS
SYSTOLIC BLOOD PRESSURE: 170 MMHG | HEIGHT: 68 IN | TEMPERATURE: 96.7 F | RESPIRATION RATE: 16 BRPM | WEIGHT: 237.8 LBS | BODY MASS INDEX: 36.04 KG/M2 | DIASTOLIC BLOOD PRESSURE: 91 MMHG | HEART RATE: 84 BPM

## 2021-04-13 DIAGNOSIS — E78.5 DYSLIPIDEMIA: ICD-10-CM

## 2021-04-13 DIAGNOSIS — I12.9 BENIGN HYPERTENSION WITH CKD (CHRONIC KIDNEY DISEASE) STAGE III (HCC): ICD-10-CM

## 2021-04-13 DIAGNOSIS — N18.31 STAGE 3A CHRONIC KIDNEY DISEASE (HCC): ICD-10-CM

## 2021-04-13 DIAGNOSIS — E66.09 CLASS 2 OBESITY DUE TO EXCESS CALORIES WITHOUT SERIOUS COMORBIDITY WITH BODY MASS INDEX (BMI) OF 36.0 TO 36.9 IN ADULT: Primary | ICD-10-CM

## 2021-04-13 DIAGNOSIS — N18.30 BENIGN HYPERTENSION WITH CKD (CHRONIC KIDNEY DISEASE) STAGE III (HCC): ICD-10-CM

## 2021-04-13 DIAGNOSIS — K42.9 UMBILICAL HERNIA WITHOUT OBSTRUCTION AND WITHOUT GANGRENE: ICD-10-CM

## 2021-04-13 DIAGNOSIS — R73.02 IGT (IMPAIRED GLUCOSE TOLERANCE): ICD-10-CM

## 2021-04-13 PROBLEM — E66.812 CLASS 2 OBESITY DUE TO EXCESS CALORIES WITHOUT SERIOUS COMORBIDITY WITH BODY MASS INDEX (BMI) OF 36.0 TO 36.9 IN ADULT: Status: ACTIVE | Noted: 2021-04-13

## 2021-04-13 LAB
ALBUMIN SERPL BCP-MCNC: 3.9 G/DL (ref 3.5–5)
ANION GAP SERPL CALCULATED.3IONS-SCNC: 4 MMOL/L (ref 4–13)
BUN SERPL-MCNC: 7 MG/DL (ref 5–25)
CALCIUM SERPL-MCNC: 8.8 MG/DL (ref 8.3–10.1)
CHLORIDE SERPL-SCNC: 102 MMOL/L (ref 100–108)
CHOLEST SERPL-MCNC: 181 MG/DL (ref 50–200)
CO2 SERPL-SCNC: 31 MMOL/L (ref 21–32)
CREAT SERPL-MCNC: 1.22 MG/DL (ref 0.6–1.3)
EST. AVERAGE GLUCOSE BLD GHB EST-MCNC: 140 MG/DL
GFR SERPL CREATININE-BSD FRML MDRD: 66 ML/MIN/1.73SQ M
GLUCOSE P FAST SERPL-MCNC: 154 MG/DL (ref 65–99)
HBA1C MFR BLD: 6.5 %
HDLC SERPL-MCNC: 35 MG/DL
LDLC SERPL CALC-MCNC: 106 MG/DL (ref 0–100)
PHOSPHATE SERPL-MCNC: 2.3 MG/DL (ref 2.7–4.5)
POTASSIUM SERPL-SCNC: 3.4 MMOL/L (ref 3.5–5.3)
SODIUM SERPL-SCNC: 137 MMOL/L (ref 136–145)
TRIGL SERPL-MCNC: 200 MG/DL

## 2021-04-13 PROCEDURE — 80069 RENAL FUNCTION PANEL: CPT

## 2021-04-13 PROCEDURE — 3008F BODY MASS INDEX DOCD: CPT | Performed by: SURGERY

## 2021-04-13 PROCEDURE — 1036F TOBACCO NON-USER: CPT | Performed by: SURGERY

## 2021-04-13 PROCEDURE — 80061 LIPID PANEL: CPT

## 2021-04-13 PROCEDURE — 99213 OFFICE O/P EST LOW 20 MIN: CPT | Performed by: SURGERY

## 2021-04-13 PROCEDURE — 36415 COLL VENOUS BLD VENIPUNCTURE: CPT

## 2021-04-13 PROCEDURE — 83036 HEMOGLOBIN GLYCOSYLATED A1C: CPT

## 2021-04-13 RX ORDER — SODIUM CHLORIDE, SODIUM LACTATE, POTASSIUM CHLORIDE, CALCIUM CHLORIDE 600; 310; 30; 20 MG/100ML; MG/100ML; MG/100ML; MG/100ML
125 INJECTION, SOLUTION INTRAVENOUS CONTINUOUS
Status: CANCELLED | OUTPATIENT
Start: 2021-04-22

## 2021-04-13 RX ORDER — CEFAZOLIN SODIUM 2 G/50ML
2000 SOLUTION INTRAVENOUS ONCE
Status: CANCELLED | OUTPATIENT
Start: 2021-04-22 | End: 2021-04-22

## 2021-04-13 NOTE — ASSESSMENT & PLAN NOTE
I reviewed the CT scan and the images from his visit emergency department and emergency department visit note  He does have a reducible fat containing umbilical hernia  On review of previous CT scan in November of 2020 the hernia was the present and the same size  Explained to him that his GI complaints that he has been having is not related to his small umbilical hernia  However he does have a hernia and now that is more aware of it it is causing more discomfort  Will plan on open hernia repair with or without mesh at [address]   The risks benefits alternatives explained to is agreeable to proceed

## 2021-04-13 NOTE — PROGRESS NOTES
Assessment/Plan:    Umbilical hernia without obstruction and without gangrene  I reviewed the CT scan and the images from his visit emergency department and emergency department visit note  He does have a reducible fat containing umbilical hernia  On review of previous CT scan in November of 2020 the hernia was the present and the same size  Explained to him that his GI complaints that he has been having is not related to his small umbilical hernia  However he does have a hernia and now that is more aware of it it is causing more discomfort  Will plan on open hernia repair with or without mesh at [facility]   The risks benefits alternatives explained to is agreeable to proceed  Class 2 obesity due to excess calories without serious comorbidity with body mass index (BMI) of 36 0 to 36 9 in adult   Recommend weight loss and healthy diet  Increase obesity increases the risk of recurrence       Diagnoses and all orders for this visit:    Class 2 obesity due to excess calories without serious comorbidity with body mass index (BMI) of 36 0 to 36 9 in adult    Umbilical hernia without obstruction and without gangrene  -     Ambulatory referral to General Surgery  -     Case request operating room: [address] [place] [address]; Standing  -     Case request operating room: [address] [place] [address]    Other orders  -     Diet NPO; Sips with meds; Standing  -     Apply Sequential Compression Device; Standing  -     Place sequential compression device; Standing  -     Reason for no Mechanical VTE Prophylaxis; Standing  -     lactated ringers infusion  -     ceFAZolin (ANCEF) 2,000 mg in dextrose 5 % 100 mL IVPB          Subjective:      Patient ID: Rosie Verma is a 63 y o  male  Mr Pernell Swenson  Is a 48-year-old gentleman that referred here for evaluation of abdominal pain and umbilical hernia    Patient states about a month ago he was having a celebration and was lifting some beer he developed abdominal pain  He then started having some diarrhea and some vomiting and maybe a fever  This persisted for few days and they went to the emergency department for evaluation  At that time had a CT scan that showed enteritis as well as an umbilical hernia  Since then his bowel movements are returned relatively to normal although he still does have intermittent loose bowel movements but this is his baseline  He had a colonoscopy few years ago and was otherwise normal at that time  He still having some lower abdominal discomfort and is now very aware of umbilical hernia and is here for evaluation  The following portions of the patient's history were reviewed and updated as appropriate: allergies, current medications, past family history, past medical history, past social history, past surgical history and problem list     Review of Systems   Constitutional: Negative for chills and fever  HENT: Negative for ear pain and sore throat  Eyes: Negative for pain and visual disturbance  Respiratory: Negative for cough and shortness of breath  Cardiovascular: Negative for chest pain and palpitations  Gastrointestinal: Positive for abdominal pain and diarrhea  Negative for vomiting  Genitourinary: Negative for dysuria and hematuria  Musculoskeletal: Negative for arthralgias and back pain  Skin: Negative for color change and rash  Neurological: Negative for seizures and syncope  Psychiatric/Behavioral: Negative for agitation and behavioral problems  All other systems reviewed and are negative  Objective:      /91   Pulse 84   Temp (!) 96 7 °F (35 9 °C)   Resp 16   Ht 5' 8" (1 727 m)   Wt 108 kg (237 lb 12 8 oz)   BMI 36 16 kg/m²          Physical Exam  Vitals signs and nursing note reviewed  Constitutional:       General: He is not in acute distress  Appearance: He is well-developed  He is not diaphoretic  HENT:      Head: Normocephalic and atraumatic     Eyes: Pupils: Pupils are equal, round, and reactive to light  Neck:      Musculoskeletal: Normal range of motion and neck supple  Cardiovascular:      Rate and Rhythm: Normal rate and regular rhythm  Heart sounds: Normal heart sounds  No murmur  Pulmonary:      Effort: Pulmonary effort is normal  No respiratory distress  Breath sounds: Normal breath sounds  No wheezing  Abdominal:      General: Bowel sounds are normal       Palpations: Abdomen is soft  Hernia: A hernia is present  Comments:   Reducible fat containing umbilical hernia  No evidence of inguinal hernias   Musculoskeletal: Normal range of motion  Skin:     General: Skin is warm and dry  Neurological:      Mental Status: He is alert and oriented to person, place, and time     Psychiatric:         Behavior: Behavior normal

## 2021-04-14 NOTE — RESULT ENCOUNTER NOTE
Please contact patient  Test for diabetes, hemoglobin A1c, is 6 5,  Indicating early start of diabetes  I would like to see patient in person ( if possible) for annual physical to discuss his blood work and establish plan of action  He needs to start low carbohydrate low-fat diet,lose weight  and exercise  He can schedule this appointment after completing his COVID-19 vaccination    Thank you

## 2021-04-15 ENCOUNTER — TELEPHONE (OUTPATIENT)
Dept: NEPHROLOGY | Facility: CLINIC | Age: 56
End: 2021-04-15

## 2021-04-15 NOTE — TELEPHONE ENCOUNTER
Called and left message on 4/14/21 and again 4/15/21         ----- Message from Ten Scott MD sent at 4/13/2021  6:50 PM EDT -----  Please inform patient that renal function is stable  However, potassium is still low  1  Please have him increase potassium chloride to 20 meq twice a day  2  Please remind him to do the 24 hour urine for sodium, aldosterone and creatinine  3  Please arrange for follow up in the office in 4-8 weeks  Labs: BMP

## 2021-04-19 ENCOUNTER — IMMUNIZATIONS (OUTPATIENT)
Dept: FAMILY MEDICINE CLINIC | Facility: HOSPITAL | Age: 56
End: 2021-04-19

## 2021-04-19 DIAGNOSIS — Z23 ENCOUNTER FOR IMMUNIZATION: Primary | ICD-10-CM

## 2021-04-19 PROCEDURE — 91300 SARS-COV-2 / COVID-19 MRNA VACCINE (PFIZER-BIONTECH) 30 MCG: CPT

## 2021-04-19 PROCEDURE — 0002A SARS-COV-2 / COVID-19 MRNA VACCINE (PFIZER-BIONTECH) 30 MCG: CPT

## 2021-04-20 ENCOUNTER — ANESTHESIA EVENT (OUTPATIENT)
Dept: PERIOP | Facility: HOSPITAL | Age: 56
End: 2021-04-20
Payer: COMMERCIAL

## 2021-04-20 NOTE — PRE-PROCEDURE INSTRUCTIONS
Pre-Surgery Instructions:   Medication Instructions    amLODIPine (NORVASC) 5 mg tablet Instructed patient per Anesthesia Guidelines   escitalopram (LEXAPRO) 20 mg tablet Instructed patient per Anesthesia Guidelines   pantoprazole (PROTONIX) 40 mg tablet Instructed patient per Anesthesia Guidelines  Patient   instructed to take*amlodipine,lexapro, protonix*with a sip of water the morning of surgery  Patient / instructed on use of chlorhexidine soap per hospital protocol    Patient instructed to stop all ASA, NSAIDS, vitamins and herbal supplements one week prior to surgery or per Dr Anuja Christensen

## 2021-04-22 ENCOUNTER — ANESTHESIA (OUTPATIENT)
Dept: PERIOP | Facility: HOSPITAL | Age: 56
End: 2021-04-22
Payer: COMMERCIAL

## 2021-04-22 ENCOUNTER — HOSPITAL ENCOUNTER (OUTPATIENT)
Facility: HOSPITAL | Age: 56
Setting detail: OUTPATIENT SURGERY
Discharge: HOME | End: 2021-04-22
Attending: SURGERY | Admitting: SURGERY
Payer: COMMERCIAL

## 2021-04-22 VITALS
SYSTOLIC BLOOD PRESSURE: 123 MMHG | HEIGHT: 68 IN | OXYGEN SATURATION: 97 % | BODY MASS INDEX: 35.01 KG/M2 | DIASTOLIC BLOOD PRESSURE: 64 MMHG | RESPIRATION RATE: 16 BRPM | HEART RATE: 83 BPM | WEIGHT: 231 LBS | TEMPERATURE: 99.4 F

## 2021-04-22 DIAGNOSIS — K42.9 UMBILICAL HERNIA WITHOUT OBSTRUCTION AND WITHOUT GANGRENE: Primary | ICD-10-CM

## 2021-04-22 LAB
ANION GAP SERPL CALCULATED.3IONS-SCNC: 8 MMOL/L (ref 4–13)
BUN SERPL-MCNC: 9 MG/DL (ref 5–25)
CALCIUM SERPL-MCNC: 8.1 MG/DL (ref 8.3–10.1)
CHLORIDE SERPL-SCNC: 103 MMOL/L (ref 100–108)
CO2 SERPL-SCNC: 30 MMOL/L (ref 21–32)
CREAT SERPL-MCNC: 1.23 MG/DL (ref 0.6–1.3)
GFR SERPL CREATININE-BSD FRML MDRD: 65 ML/MIN/1.73SQ M
GLUCOSE P FAST SERPL-MCNC: 120 MG/DL (ref 65–99)
GLUCOSE SERPL-MCNC: 120 MG/DL (ref 65–140)
POTASSIUM SERPL-SCNC: 3.4 MMOL/L (ref 3.5–5.3)
SODIUM SERPL-SCNC: 141 MMOL/L (ref 136–145)

## 2021-04-22 PROCEDURE — C1781 MESH (IMPLANTABLE): HCPCS | Performed by: SURGERY

## 2021-04-22 PROCEDURE — 80048 BASIC METABOLIC PNL TOTAL CA: CPT | Performed by: ANESTHESIOLOGY

## 2021-04-22 PROCEDURE — 49585 PR REPAIR UMBILICAL HERN,5+Y/O,REDUC: CPT | Performed by: SURGERY

## 2021-04-22 DEVICE — VENTRALEX ST HERNIA PATCH
Type: IMPLANTABLE DEVICE | Site: UMBILICAL | Status: FUNCTIONAL
Brand: VENTRALEX ST HERNIA PATCH

## 2021-04-22 RX ORDER — EPHEDRINE SULFATE 50 MG/ML
INJECTION INTRAVENOUS AS NEEDED
Status: DISCONTINUED | OUTPATIENT
Start: 2021-04-22 | End: 2021-04-22

## 2021-04-22 RX ORDER — PROPOFOL 10 MG/ML
INJECTION, EMULSION INTRAVENOUS AS NEEDED
Status: DISCONTINUED | OUTPATIENT
Start: 2021-04-22 | End: 2021-04-22

## 2021-04-22 RX ORDER — SODIUM CHLORIDE, SODIUM LACTATE, POTASSIUM CHLORIDE, CALCIUM CHLORIDE 600; 310; 30; 20 MG/100ML; MG/100ML; MG/100ML; MG/100ML
125 INJECTION, SOLUTION INTRAVENOUS CONTINUOUS
Status: DISCONTINUED | OUTPATIENT
Start: 2021-04-22 | End: 2021-04-22 | Stop reason: HOSPADM

## 2021-04-22 RX ORDER — BUPIVACAINE HYDROCHLORIDE 2.5 MG/ML
INJECTION, SOLUTION EPIDURAL; INFILTRATION; INTRACAUDAL AS NEEDED
Status: DISCONTINUED | OUTPATIENT
Start: 2021-04-22 | End: 2021-04-22 | Stop reason: HOSPADM

## 2021-04-22 RX ORDER — FENTANYL CITRATE 50 UG/ML
INJECTION, SOLUTION INTRAMUSCULAR; INTRAVENOUS AS NEEDED
Status: DISCONTINUED | OUTPATIENT
Start: 2021-04-22 | End: 2021-04-22

## 2021-04-22 RX ORDER — CEFAZOLIN SODIUM 2 G/50ML
2000 SOLUTION INTRAVENOUS ONCE
Status: DISCONTINUED | OUTPATIENT
Start: 2021-04-22 | End: 2021-04-22 | Stop reason: HOSPADM

## 2021-04-22 RX ORDER — MIDAZOLAM HYDROCHLORIDE 2 MG/2ML
INJECTION, SOLUTION INTRAMUSCULAR; INTRAVENOUS AS NEEDED
Status: DISCONTINUED | OUTPATIENT
Start: 2021-04-22 | End: 2021-04-22

## 2021-04-22 RX ORDER — ALBUTEROL SULFATE 2.5 MG/3ML
2.5 SOLUTION RESPIRATORY (INHALATION) ONCE AS NEEDED
Status: COMPLETED | OUTPATIENT
Start: 2021-04-22 | End: 2021-04-22

## 2021-04-22 RX ORDER — SODIUM CHLORIDE 9 MG/ML
125 INJECTION, SOLUTION INTRAVENOUS CONTINUOUS
Status: DISCONTINUED | OUTPATIENT
Start: 2021-04-22 | End: 2021-04-22 | Stop reason: HOSPADM

## 2021-04-22 RX ORDER — OXYCODONE HYDROCHLORIDE AND ACETAMINOPHEN 5; 325 MG/1; MG/1
1 TABLET ORAL EVERY 4 HOURS PRN
Status: DISCONTINUED | OUTPATIENT
Start: 2021-04-22 | End: 2021-04-22 | Stop reason: HOSPADM

## 2021-04-22 RX ORDER — OXYCODONE HYDROCHLORIDE 5 MG/1
5 TABLET ORAL EVERY 4 HOURS PRN
Qty: 20 TABLET | Refills: 0 | Status: SHIPPED | OUTPATIENT
Start: 2021-04-22 | End: 2021-05-02

## 2021-04-22 RX ORDER — ROCURONIUM BROMIDE 10 MG/ML
INJECTION, SOLUTION INTRAVENOUS AS NEEDED
Status: DISCONTINUED | OUTPATIENT
Start: 2021-04-22 | End: 2021-04-22

## 2021-04-22 RX ORDER — CEFAZOLIN SODIUM 2 G/50ML
SOLUTION INTRAVENOUS AS NEEDED
Status: DISCONTINUED | OUTPATIENT
Start: 2021-04-22 | End: 2021-04-22

## 2021-04-22 RX ORDER — ONDANSETRON 2 MG/ML
4 INJECTION INTRAMUSCULAR; INTRAVENOUS ONCE AS NEEDED
Status: DISCONTINUED | OUTPATIENT
Start: 2021-04-22 | End: 2021-04-22 | Stop reason: HOSPADM

## 2021-04-22 RX ORDER — OXYCODONE HYDROCHLORIDE AND ACETAMINOPHEN 5; 325 MG/1; MG/1
2 TABLET ORAL EVERY 6 HOURS PRN
Status: DISCONTINUED | OUTPATIENT
Start: 2021-04-22 | End: 2021-04-22 | Stop reason: HOSPADM

## 2021-04-22 RX ORDER — FENTANYL CITRATE/PF 50 MCG/ML
50 SYRINGE (ML) INJECTION
Status: DISCONTINUED | OUTPATIENT
Start: 2021-04-22 | End: 2021-04-22 | Stop reason: HOSPADM

## 2021-04-22 RX ORDER — DEXAMETHASONE SODIUM PHOSPHATE 10 MG/ML
INJECTION, SOLUTION INTRAMUSCULAR; INTRAVENOUS AS NEEDED
Status: DISCONTINUED | OUTPATIENT
Start: 2021-04-22 | End: 2021-04-22

## 2021-04-22 RX ORDER — MAGNESIUM HYDROXIDE 1200 MG/15ML
LIQUID ORAL AS NEEDED
Status: DISCONTINUED | OUTPATIENT
Start: 2021-04-22 | End: 2021-04-22 | Stop reason: HOSPADM

## 2021-04-22 RX ORDER — LIDOCAINE HYDROCHLORIDE 10 MG/ML
INJECTION, SOLUTION EPIDURAL; INFILTRATION; INTRACAUDAL; PERINEURAL AS NEEDED
Status: DISCONTINUED | OUTPATIENT
Start: 2021-04-22 | End: 2021-04-22

## 2021-04-22 RX ORDER — ONDANSETRON 2 MG/ML
INJECTION INTRAMUSCULAR; INTRAVENOUS AS NEEDED
Status: DISCONTINUED | OUTPATIENT
Start: 2021-04-22 | End: 2021-04-22

## 2021-04-22 RX ADMIN — SODIUM CHLORIDE 125 ML/HR: 0.9 INJECTION, SOLUTION INTRAVENOUS at 14:21

## 2021-04-22 RX ADMIN — PROPOFOL 200 MG: 10 INJECTION, EMULSION INTRAVENOUS at 14:57

## 2021-04-22 RX ADMIN — OXYCODONE HYDROCHLORIDE AND ACETAMINOPHEN 1 TABLET: 5; 325 TABLET ORAL at 18:21

## 2021-04-22 RX ADMIN — DEXAMETHASONE SODIUM PHOSPHATE 4 MG: 10 INJECTION, SOLUTION INTRAMUSCULAR; INTRAVENOUS at 15:15

## 2021-04-22 RX ADMIN — EPHEDRINE SULFATE 20 MG: 50 INJECTION, SOLUTION INTRAVENOUS at 15:15

## 2021-04-22 RX ADMIN — FENTANYL CITRATE 100 MCG: 50 INJECTION INTRAMUSCULAR; INTRAVENOUS at 15:28

## 2021-04-22 RX ADMIN — LIDOCAINE HYDROCHLORIDE 100 MG: 10 INJECTION, SOLUTION EPIDURAL; INFILTRATION; INTRACAUDAL; PERINEURAL at 16:01

## 2021-04-22 RX ADMIN — LIDOCAINE HYDROCHLORIDE 100 MG: 10 INJECTION, SOLUTION EPIDURAL; INFILTRATION; INTRACAUDAL; PERINEURAL at 14:57

## 2021-04-22 RX ADMIN — MIDAZOLAM 2 MG: 1 INJECTION INTRAMUSCULAR; INTRAVENOUS at 14:47

## 2021-04-22 RX ADMIN — ALBUTEROL SULFATE 2.5 MG: 2.5 SOLUTION RESPIRATORY (INHALATION) at 16:48

## 2021-04-22 RX ADMIN — CEFAZOLIN SODIUM 2000 MG: 2 SOLUTION INTRAVENOUS at 14:43

## 2021-04-22 RX ADMIN — ONDANSETRON 4 MG: 2 INJECTION INTRAMUSCULAR; INTRAVENOUS at 15:15

## 2021-04-22 RX ADMIN — FENTANYL CITRATE 100 MCG: 50 INJECTION INTRAMUSCULAR; INTRAVENOUS at 14:57

## 2021-04-22 RX ADMIN — FENTANYL CITRATE 50 MCG: 50 INJECTION, SOLUTION INTRAMUSCULAR; INTRAVENOUS at 16:52

## 2021-04-22 RX ADMIN — FENTANYL CITRATE 50 MCG: 50 INJECTION, SOLUTION INTRAMUSCULAR; INTRAVENOUS at 17:09

## 2021-04-22 RX ADMIN — ROCURONIUM BROMIDE 40 MG: 10 INJECTION, SOLUTION INTRAVENOUS at 14:58

## 2021-04-22 NOTE — PERIOPERATIVE NURSING NOTE
Patient maintaining oxygen saturation >92% when utiliizing ear oxygen probe  Continues to utilize incentive spirometer, achieving 2500 mL

## 2021-04-22 NOTE — ANESTHESIA POSTPROCEDURE EVALUATION
Post-Op Assessment Note    CV Status:  Stable    Pain management: adequate     Mental Status:  Alert and awake   Hydration Status:  Euvolemic   PONV Controlled:  Controlled   Airway Patency:  Patent      Post Op Vitals Reviewed: Yes      Staff: Anesthesiologist         No complications documented      /63 (04/22/21 1656)    Temp      Pulse 80 (04/22/21 1656)   Resp (!) 11 (04/22/21 1656)    SpO2 99 % (04/22/21 1656)

## 2021-04-22 NOTE — DISCHARGE INSTRUCTIONS
[facility]  Post-Operative Care Instructions  Dr Guanakito Lopez MD, Teagan Pardo  [phone]    1  General: You will feel pulling sensations around the wound or funny aches and pains around the incisions  This is normal  Even minor surgery is a change in your body and this is your bodys way of reaction to it  If you have had abdominal surgery, it may help to support the incision with a small pillow or blanket for comfort when moving or coughing  2  Wound care:  Okay to shower  The glue will fall off over the next week or 2     3  Water: You may shower over the wound, unless there are drain tubes left in place  Do not bathe or use a pool or hot tub until cleared by the physician  4  Activity: You may go up and down stairs, walk as much as you are comfortable, but walk at least 3 times each day  If you have had abdominal surgery, do not lift anything heavier than 20 pounds for at least 4 weeks, unless cleared by the doctor  5  Diet: You may resume a regular diet  If you had a same-day surgery or overnight stay surgery, he may wish to eat lightly for a few days: soups, crackers, and sandwiches  You may resume a regular diet when ready  6  Medications: Resume all of your previous medications, unless told otherwise by the doctor  A good option for pain control is to start with Tylenol and ibuprofen and alternate taking them every 2 hours for 1-2 days  If this is not sufficient then substituted the Tylenol for the narcotic pain medicine prescribed  Insure that you do not take more than 4000 mg of Tylenol per day  You do not need to take the narcotic pain medications unless you are having significant pain and discomfort  7  Driving: You will need someone to drive you home on the day of surgery  Do not drive or make any important decisions while on narcotic pain medication or 24 hours and after anesthesia or sedation for surgery   Generally, you may drive when your off all narcotic pain medications  8  Upset Stomach: You may take Maalox, Tums, or similar items for an upset stomach  If your narcotic pain medication causes an upset stomach, do not take it on an empty stomach  Try taking it with at least some crackers or toast      9  Constipation: Patients often experienced constipation after surgery  You may take over-the-counter medication for this, such as Metamucil, Senokot, Dulcolax, milk of magnesia, etc  You may take a suppository unless you have had anorectal surgery such as a procedure on your hemorrhoids  If you experience significant nausea or vomiting after abdominal surgery, call the office before trying any of these medications  10  Call the office: If you are experiencing any of the following, fevers above 101 5°, significant nausea or vomiting, if the wound develops drainage and/or is excessive redness around the wound, or if you have significant diarrhea or other worsening symptoms  11  Pain: You may be given a prescription for pain  This will be given to the hospital, the day of surgery  12  Sexual Activity: You may resume sexual activity when you feel ready and comfortable and your incision is sealed and healed without apparent infection risk

## 2021-04-22 NOTE — OP NOTE
OPERATIVE REPORT  PATIENT NAME: Morena Perez    :  1965  MRN: [id number]  Pt Location: [facility] OR ROOM 08    SURGERY DATE: 2021    Surgeon(s) and Role:     * Raymnod Hayden MD - Primary    Preop Diagnosis:  Umbilical hernia without obstruction and without gangrene [K42 9]    Post-Op Diagnosis Codes:     * Umbilical hernia without obstruction and without gangrene [K42 9]    Procedure(s) (LRB):  REPAIR HERNIA UMBILICAL WITH MESH (N/A)    Specimen(s):  * No specimens in log *    Estimated Blood Loss:   Minimal    Drains:  * No LDAs found *    Anesthesia Type:   General    Operative Indications:  Umbilical hernia without obstruction and without gangrene [K42 9]      Operative Findings:  Umbilical hernia fascial defect greater than 1 5 cm    Complications:   None    Procedure and Technique:  The patient was seen in the Holding Room  The risks, benefits, complications, treatment options, and expected outcomes were discussed with the patient  The possibilities of reaction to medication, pulmonary aspiration, perforation of viscus, bleeding, recurrent infection, the need for additional procedures, failure to diagnose a condition, and creating a complication requiring transfusion or operation were discussed with the patient  The patient concurred with the proposed plan, giving informed consent  The site of surgery properly noted/marked  The patient was taken to Operating Room, identified as Morena Perez and staff verified patient name, , and procedure  A Time Out was held and the above information confirmed  The patient was placed supine  After establishing general anesthesia, the abdomen was prepped and draped in standard fashion  Local anesthesia was used at the incision  An infraumbilical incision was made  Dissection was carried down to the hernia sac located above the fascia and was mobilized from surrounding structures   The hernia sac was opened, its contents reduced, and the sac was suture-ligated at its base using a 2-0 Vicryl suture if necessary  The defect was greater 1 5 cm and appropriate for mesh  Intact fascia was identified circumferentially around the defect  Adhesions anterior and posterior to the fascia were lysed using cautery and/or blunt dissection  A medium Ventralex ST mesh was placed in the preperitoneal space secured the 4 corners with 2-0 Prolene sutures  The Fascia was closed with interrupted 2-0 Prolene sutures  The umbilicus was re-created using 3-0 Vicryl sutures  The soft tissue was irrigated and closed in layers,using Vicryl sutures  Hemostasis was confirmed  The skin incision was closed in layers with a 4-0 Monocryl subcuticular closure  Histocryl glue was used  Instrument, sponge, and needle counts were correct prior to closure and at the conclusion of the case  This text is generated with voice recognition software  There may be translation, syntax,  or grammatical errors  If you have any questions, please contact the dictating provider  The PA was medically necessary for assistance with opening closing retraction visualization and hemostasis         I was present for the entire procedure    Patient Disposition:  PACU     SIGNATURE: Theodora Guido MD  DATE: April 22, 2021  TIME: 3:51 PM

## 2021-04-22 NOTE — ANESTHESIA PREPROCEDURE EVALUATION
Procedure:  REPAIR HERNIA UMBILICAL (N/A Abdomen)    Relevant Problems   CARDIO   (+) Benign hypertension with CKD (chronic kidney disease) stage III   (+) Essential hypertension      GI/HEPATIC   (+) Acid reflux disease      /RENAL   (+) Chronic kidney disease-mineral and bone disorder   (+) Kidney cysts   (+) Stage 3a chronic kidney disease      NEURO/PSYCH   (+) MARLYN (generalized anxiety disorder)      PULMONARY   (+) Asthma      Other   (+) Class 2 obesity due to excess calories without serious comorbidity with body mass index (BMI) of 36 0 to 36 9 in adult   (+) Dyslipidemia   (+) Hypokalemia        Physical Exam    Airway    Mallampati score: II  TM Distance: >3 FB  Neck ROM: full     Dental   Comment: caps, No notable dental hx     Cardiovascular  Rhythm: regular, Rate: normal, Cardiovascular exam normal    Pulmonary  Pulmonary exam normal Breath sounds clear to auscultation,     Other Findings        Anesthesia Plan  ASA Score- 2     Anesthesia Type- general with ASA Monitors  Additional Monitors:   Airway Plan: ETT  Comment: Ordered chemistry  Last K= 3 1    Plan Factors-    Chart reviewed  Existing labs reviewed  Patient summary reviewed  Induction- intravenous  Postoperative Plan- Plan for postoperative opioid use       Informed Consent-

## 2021-04-27 ENCOUNTER — TELEPHONE (OUTPATIENT)
Dept: SURGERY | Facility: CLINIC | Age: 56
End: 2021-04-27

## 2021-04-27 NOTE — TELEPHONE ENCOUNTER
Two day post op call  Left message for patient  Asked how patient was doing,  if there were any questions or concerns  Reminded patient of post op appointment and to call if there are any questions or concerns prior to appointment

## 2021-05-05 ENCOUNTER — OFFICE VISIT (OUTPATIENT)
Dept: SURGERY | Facility: CLINIC | Age: 56
End: 2021-05-05

## 2021-05-05 VITALS
TEMPERATURE: 97.4 F | BODY MASS INDEX: 35.71 KG/M2 | HEART RATE: 80 BPM | WEIGHT: 235.6 LBS | HEIGHT: 68 IN | RESPIRATION RATE: 16 BRPM | SYSTOLIC BLOOD PRESSURE: 124 MMHG | DIASTOLIC BLOOD PRESSURE: 86 MMHG

## 2021-05-05 DIAGNOSIS — Z98.890 POST-OPERATIVE STATE: Primary | ICD-10-CM

## 2021-05-05 PROCEDURE — 3008F BODY MASS INDEX DOCD: CPT | Performed by: SURGERY

## 2021-05-05 PROCEDURE — 99024 POSTOP FOLLOW-UP VISIT: CPT | Performed by: PHYSICIAN ASSISTANT

## 2021-05-05 NOTE — PROGRESS NOTES
Assessment/Plan:   Dick Daley is a 63 y o male who comes in today for postoperative check after Repair of umbilical hernia with mesh done on 04/22/2021  Patient is feeling well  Does still note some incisional pain  Pain is tolerable  He did use his narcotic medications  He is not using Tylenol  He is eating well and having regular bowel movements  He has not had any fevers or chills  He is following lifting activity restrictions  On exam patient's abdomen is benign  His incision site is clean, dry, intact  Surgical glue remains in place  At this point patient is recovering well  He should continue to refrain from strenuous exercise and lift nothing greater than 20 lb until 05/22/2021  At that point he may return to activity as tolerated  He does not require further surgical follow-up  He should continue call with any questions or concerns  HPI:  Dick Daley is a 63 y o male who comes in today for postoperative check after recent    Umbilical hernia repair as above  Currently doing well without problems, no fever or chills,no nausea and no vomiting  Reports   Some incisional pain  Pain is controlled with Tylenol at this point  He did uses narcotic medication  He is eating well and moving his bowels  He has not had any fevers or chills  Incision is healing well  He is following lifting and activity restrictions  ROS:  General ROS: negative for - chills, fatigue, fever or night sweats, weight loss  Respiratory ROS: no cough, shortness of breath, or wheezing  Cardiovascular ROS: no chest pain or dyspnea on exertion  Abdomen ROS: as per HPI  Genito-Urinary ROS: no dysuria, trouble voiding, or hematuria  Musculoskeletal ROS: negative for - gait disturbance, joint pain or muscle pain  Neurological ROS: no TIA or stroke symptoms  Skin ROS: surgical incision    ALLERGIES  Patient has no known allergies      Current Outpatient Medications:     amLODIPine (NORVASC) 5 mg tablet, Take 1 tablet (5 mg total) by mouth 2 (two) times a day, Disp: 180 tablet, Rfl: 3    escitalopram (LEXAPRO) 20 mg tablet, take 1 tablet by mouth once daily, Disp: 90 tablet, Rfl: 3    pantoprazole (PROTONIX) 40 mg tablet, take 1 tablet by mouth every morning 1/2 HOUR before breakfast, Disp: 90 tablet, Rfl: 3    ergocalciferol (ERGOCALCIFEROL) 1 25 MG (58170 UT) capsule, Take 1 capsule (50,000 Units total) by mouth once a week (Patient not taking: Reported on 3/8/2021), Disp: 16 capsule, Rfl: 1    methocarbamol (ROBAXIN) 750 mg tablet, Take 1 tablet (750 mg total) by mouth 2 (two) times a day as needed for muscle spasms, Disp: 60 tablet, Rfl: 0    ondansetron (Zofran ODT) 4 mg disintegrating tablet, Take 2 tablets (8 mg total) by mouth every 8 (eight) hours as needed for nausea or vomiting for up to 10 doses (Patient not taking: Reported on 4/13/2021), Disp: 10 tablet, Rfl: 3    potassium chloride (K-DUR,KLOR-CON) 20 mEq tablet, Take 1 tablet (20 mEq total) by mouth daily (Patient not taking: Reported on 3/8/2021), Disp: 90 tablet, Rfl: 1    sodium chloride 1 g tablet, Take 1 tablet (1 g total) by mouth 3 (three) times a day for 4 days (Patient not taking: Reported on 11/20/2020), Disp: 12 tablet, Rfl: 0    tamsulosin (FLOMAX) 0 4 mg, Take 1 capsule (0 4 mg total) by mouth daily with dinner (Patient not taking: Reported on 3/8/2021), Disp: 90 capsule, Rfl: 0  Past Medical History:   Diagnosis Date    Chronic kidney disease     "monitoring"    Colon polyp     Dental crowns present     veneers upper front 4    GERD (gastroesophageal reflux disease)     History of palpitations     "not recently"    History of pneumonia as a child     Hypertension     Motion sickness     Obesity     Shortness of breath     "with exertion, out of shape"    Umbilical hernia     Umbilical pain      Past Surgical History:   Procedure Laterality Date    COLONOSCOPY      LYMPH NODE DISSECTION "removed from throat as a child, told not cancer"    HI REPAIR UMBILICAL CTAH,6+K/S,PBKMB N/A 4/22/2021    Procedure: REPAIR HERNIA UMBILICAL WITH MESH;  Surgeon: Gustabo Valentine MD;  Location: [facility];  Service: General    TONSILLECTOMY      VASECTOMY      Surgery Vas Deferens Vasectomy     Family History   Problem Relation Age of Onset    Diabetes Father         Diabetes Mellitus    Hypertension Father     Breast cancer Sister       reports that he has quit smoking  His smoking use included cigars  He has never used smokeless tobacco  He reports current alcohol use  He reports that he does not use drugs      PHYSICAL EXAM    Vitals:    05/05/21 1407   BP: 124/86   Pulse: 80   Resp: 16   Temp: (!) 97 4 °F (36 3 °C)       General: normal, cooperative, no distress  Abdominal: soft, nondistended or non-tender, hernia repair is intact  Incision: clean, dry, and intact and healing well    Ramon Little PA-C

## 2021-05-05 NOTE — PATIENT INSTRUCTIONS
Your recovering well  Continue to follow lifting activity restrictions  Continue to call with any questions or concerns

## 2021-11-30 DIAGNOSIS — F41.9 ANXIETY: ICD-10-CM

## 2021-11-30 RX ORDER — ESCITALOPRAM OXALATE 20 MG/1
TABLET ORAL
Qty: 90 TABLET | Refills: 3 | Status: SHIPPED | OUTPATIENT
Start: 2021-11-30

## 2021-12-11 ENCOUNTER — APPOINTMENT (EMERGENCY)
Dept: RADIOLOGY | Facility: HOSPITAL | Age: 56
End: 2021-12-11
Payer: COMMERCIAL

## 2021-12-11 ENCOUNTER — HOSPITAL ENCOUNTER (EMERGENCY)
Facility: HOSPITAL | Age: 56
Discharge: HOME | End: 2021-12-11
Attending: EMERGENCY MEDICINE | Admitting: EMERGENCY MEDICINE
Payer: COMMERCIAL

## 2021-12-11 VITALS
HEART RATE: 82 BPM | TEMPERATURE: 98.1 F | OXYGEN SATURATION: 98 % | SYSTOLIC BLOOD PRESSURE: 130 MMHG | DIASTOLIC BLOOD PRESSURE: 79 MMHG | RESPIRATION RATE: 20 BRPM

## 2021-12-11 DIAGNOSIS — U07.1 COVID-19 VIRUS INFECTION: Primary | ICD-10-CM

## 2021-12-11 LAB
ALBUMIN SERPL BCP-MCNC: 3.7 G/DL (ref 3.5–5)
ALP SERPL-CCNC: 54 U/L (ref 46–116)
ALT SERPL W P-5'-P-CCNC: 26 U/L (ref 12–78)
ANION GAP SERPL CALCULATED.3IONS-SCNC: 7 MMOL/L (ref 4–13)
AST SERPL W P-5'-P-CCNC: 19 U/L (ref 5–45)
ATRIAL RATE: 80 BPM
BASOPHILS # BLD AUTO: 0.02 THOUSANDS/ΜL (ref 0–0.1)
BASOPHILS NFR BLD AUTO: 1 % (ref 0–1)
BILIRUB SERPL-MCNC: 0.44 MG/DL (ref 0.2–1)
BUN SERPL-MCNC: 12 MG/DL (ref 5–25)
CALCIUM SERPL-MCNC: 8.4 MG/DL (ref 8.3–10.1)
CARDIAC TROPONIN I PNL SERPL HS: 16 NG/L
CHLORIDE SERPL-SCNC: 102 MMOL/L (ref 100–108)
CO2 SERPL-SCNC: 27 MMOL/L (ref 21–32)
CREAT SERPL-MCNC: 1.39 MG/DL (ref 0.6–1.3)
EOSINOPHIL # BLD AUTO: 0.03 THOUSAND/ΜL (ref 0–0.61)
EOSINOPHIL NFR BLD AUTO: 1 % (ref 0–6)
ERYTHROCYTE [DISTWIDTH] IN BLOOD BY AUTOMATED COUNT: 13.2 % (ref 11.6–15.1)
FLUAV RNA RESP QL NAA+PROBE: NEGATIVE
FLUBV RNA RESP QL NAA+PROBE: NEGATIVE
GFR SERPL CREATININE-BSD FRML MDRD: 56 ML/MIN/1.73SQ M
GLUCOSE SERPL-MCNC: 153 MG/DL (ref 65–140)
HCT VFR BLD AUTO: 44.6 % (ref 36.5–49.3)
HGB BLD-MCNC: 15 G/DL (ref 12–17)
IMM GRANULOCYTES # BLD AUTO: 0.01 THOUSAND/UL (ref 0–0.2)
IMM GRANULOCYTES NFR BLD AUTO: 0 % (ref 0–2)
LYMPHOCYTES # BLD AUTO: 0.85 THOUSANDS/ΜL (ref 0.6–4.47)
LYMPHOCYTES NFR BLD AUTO: 24 % (ref 14–44)
MCH RBC QN AUTO: 29 PG (ref 26.8–34.3)
MCHC RBC AUTO-ENTMCNC: 33.6 G/DL (ref 31.4–37.4)
MCV RBC AUTO: 86 FL (ref 82–98)
MONOCYTES # BLD AUTO: 0.59 THOUSAND/ΜL (ref 0.17–1.22)
MONOCYTES NFR BLD AUTO: 16 % (ref 4–12)
NEUTROPHILS # BLD AUTO: 2.1 THOUSANDS/ΜL (ref 1.85–7.62)
NEUTS SEG NFR BLD AUTO: 58 % (ref 43–75)
NRBC BLD AUTO-RTO: 0 /100 WBCS
P AXIS: 36 DEGREES
PLATELET # BLD AUTO: 186 THOUSANDS/UL (ref 149–390)
PMV BLD AUTO: 9.7 FL (ref 8.9–12.7)
POTASSIUM SERPL-SCNC: 3.5 MMOL/L (ref 3.5–5.3)
PR INTERVAL: 160 MS
PROT SERPL-MCNC: 7.4 G/DL (ref 6.4–8.2)
QRS AXIS: -48 DEGREES
QRSD INTERVAL: 80 MS
QT INTERVAL: 376 MS
QTC INTERVAL: 426 MS
RBC # BLD AUTO: 5.18 MILLION/UL (ref 3.88–5.62)
RSV RNA RESP QL NAA+PROBE: NEGATIVE
SARS-COV-2 RNA RESP QL NAA+PROBE: POSITIVE
SODIUM SERPL-SCNC: 136 MMOL/L (ref 136–145)
T WAVE AXIS: 53 DEGREES
VENTRICULAR RATE: 77 BPM
WBC # BLD AUTO: 3.6 THOUSAND/UL (ref 4.31–10.16)

## 2021-12-11 PROCEDURE — 85025 COMPLETE CBC W/AUTO DIFF WBC: CPT | Performed by: EMERGENCY MEDICINE

## 2021-12-11 PROCEDURE — 99285 EMERGENCY DEPT VISIT HI MDM: CPT

## 2021-12-11 PROCEDURE — 0241U HB NFCT DS VIR RESP RNA 4 TRGT: CPT | Performed by: EMERGENCY MEDICINE

## 2021-12-11 PROCEDURE — 71045 X-RAY EXAM CHEST 1 VIEW: CPT

## 2021-12-11 PROCEDURE — 84484 ASSAY OF TROPONIN QUANT: CPT | Performed by: EMERGENCY MEDICINE

## 2021-12-11 PROCEDURE — 93005 ELECTROCARDIOGRAM TRACING: CPT

## 2021-12-11 PROCEDURE — 99284 EMERGENCY DEPT VISIT MOD MDM: CPT | Performed by: PHYSICIAN ASSISTANT

## 2021-12-11 PROCEDURE — 36415 COLL VENOUS BLD VENIPUNCTURE: CPT

## 2021-12-11 PROCEDURE — 93010 ELECTROCARDIOGRAM REPORT: CPT | Performed by: INTERNAL MEDICINE

## 2021-12-11 PROCEDURE — 80053 COMPREHEN METABOLIC PANEL: CPT | Performed by: EMERGENCY MEDICINE

## 2021-12-11 RX ORDER — MULTIVIT WITH MINERALS/LUTEIN
1000 TABLET ORAL DAILY
Qty: 20 TABLET | Refills: 0 | Status: SHIPPED | OUTPATIENT
Start: 2021-12-11 | End: 2022-05-13 | Stop reason: ALTCHOICE

## 2021-12-13 ENCOUNTER — TELEPHONE (OUTPATIENT)
Dept: FAMILY MEDICINE CLINIC | Facility: CLINIC | Age: 56
End: 2021-12-13

## 2021-12-22 DIAGNOSIS — K21.9 CHRONIC GERD: ICD-10-CM

## 2021-12-22 RX ORDER — PANTOPRAZOLE SODIUM 40 MG/1
TABLET, DELAYED RELEASE ORAL
Qty: 90 TABLET | Refills: 3 | Status: SHIPPED | OUTPATIENT
Start: 2021-12-22

## 2022-01-17 DIAGNOSIS — I10 ESSENTIAL HYPERTENSION: ICD-10-CM

## 2022-01-18 ENCOUNTER — TELEPHONE (OUTPATIENT)
Dept: NEPHROLOGY | Facility: CLINIC | Age: 57
End: 2022-01-18

## 2022-01-18 RX ORDER — AMLODIPINE BESYLATE 5 MG/1
5 TABLET ORAL 2 TIMES DAILY
Qty: 180 TABLET | Refills: 3 | Status: SHIPPED | OUTPATIENT
Start: 2022-01-18 | End: 2022-03-02 | Stop reason: SDUPTHER

## 2022-01-20 ENCOUNTER — TELEPHONE (OUTPATIENT)
Dept: NEPHROLOGY | Facility: CLINIC | Age: 57
End: 2022-01-20

## 2022-01-20 NOTE — TELEPHONE ENCOUNTER
LM for him to call and schedule his F/U appointment (hasn't been seen since 12/16 with Dr Digna Vasquez

## 2022-01-24 ENCOUNTER — TELEPHONE (OUTPATIENT)
Dept: NEPHROLOGY | Facility: CLINIC | Age: 57
End: 2022-01-24

## 2022-03-02 ENCOUNTER — TELEPHONE (OUTPATIENT)
Dept: UROLOGY | Facility: HOSPITAL | Age: 57
End: 2022-03-02

## 2022-03-02 DIAGNOSIS — I10 ESSENTIAL HYPERTENSION: ICD-10-CM

## 2022-03-02 RX ORDER — AMLODIPINE BESYLATE 5 MG/1
5 TABLET ORAL 2 TIMES DAILY
Qty: 60 TABLET | Refills: 1 | Status: SHIPPED | OUTPATIENT
Start: 2022-03-02 | End: 2022-05-13 | Stop reason: SDUPTHER

## 2022-03-02 NOTE — TELEPHONE ENCOUNTER
Called and LM for patient to call back that we had some questions about his voicemail  Patient was prescribed Flomax in the past for kidney stones  If he needs refill on his blood pressure meds (amlodipone) then he needs to contact Dr Justin Islas who originally prescribed him

## 2022-03-02 NOTE — TELEPHONE ENCOUNTER
Pt left a voice message he wanted to schedule an appt and needs a refill for his BP meds called into [facility], tried to call pt to schedule him an appt but recording says recording says sorry we are unable to successfully connect to this person please try again later

## 2022-03-10 ENCOUNTER — TELEPHONE (OUTPATIENT)
Dept: NEPHROLOGY | Facility: CLINIC | Age: 57
End: 2022-03-10

## 2022-05-13 ENCOUNTER — OFFICE VISIT (OUTPATIENT)
Dept: FAMILY MEDICINE CLINIC | Facility: CLINIC | Age: 57
End: 2022-05-13
Payer: COMMERCIAL

## 2022-05-13 VITALS
HEART RATE: 66 BPM | DIASTOLIC BLOOD PRESSURE: 90 MMHG | RESPIRATION RATE: 18 BRPM | HEIGHT: 68 IN | OXYGEN SATURATION: 97 % | TEMPERATURE: 97.3 F | BODY MASS INDEX: 35.61 KG/M2 | WEIGHT: 235 LBS | SYSTOLIC BLOOD PRESSURE: 158 MMHG

## 2022-05-13 DIAGNOSIS — Z12.11 COLON CANCER SCREENING: ICD-10-CM

## 2022-05-13 DIAGNOSIS — I12.9 BENIGN HYPERTENSIVE KIDNEY DISEASE WITH CHRONIC KIDNEY DISEASE STAGE I THROUGH STAGE IV, OR UNSPECIFIED: ICD-10-CM

## 2022-05-13 DIAGNOSIS — Z00.00 ENCOUNTER FOR WELLNESS EXAMINATION IN ADULT: Primary | ICD-10-CM

## 2022-05-13 DIAGNOSIS — N18.30 BENIGN HYPERTENSION WITH CKD (CHRONIC KIDNEY DISEASE) STAGE III (HCC): ICD-10-CM

## 2022-05-13 DIAGNOSIS — F41.1 GAD (GENERALIZED ANXIETY DISORDER): ICD-10-CM

## 2022-05-13 DIAGNOSIS — E78.5 DYSLIPIDEMIA: ICD-10-CM

## 2022-05-13 DIAGNOSIS — I12.9 BENIGN HYPERTENSION WITH CKD (CHRONIC KIDNEY DISEASE) STAGE III (HCC): ICD-10-CM

## 2022-05-13 DIAGNOSIS — K63.5 POLYP OF COLON, UNSPECIFIED PART OF COLON, UNSPECIFIED TYPE: ICD-10-CM

## 2022-05-13 DIAGNOSIS — Z12.5 PROSTATE CANCER SCREENING: ICD-10-CM

## 2022-05-13 DIAGNOSIS — R07.89 CHEST PAIN, ATYPICAL: ICD-10-CM

## 2022-05-13 PROBLEM — I10 ESSENTIAL HYPERTENSION: Status: RESOLVED | Noted: 2020-12-16 | Resolved: 2022-05-13

## 2022-05-13 PROCEDURE — 93000 ELECTROCARDIOGRAM COMPLETE: CPT | Performed by: FAMILY MEDICINE

## 2022-05-13 PROCEDURE — 3008F BODY MASS INDEX DOCD: CPT | Performed by: FAMILY MEDICINE

## 2022-05-13 PROCEDURE — 1036F TOBACCO NON-USER: CPT | Performed by: FAMILY MEDICINE

## 2022-05-13 PROCEDURE — 3725F SCREEN DEPRESSION PERFORMED: CPT | Performed by: FAMILY MEDICINE

## 2022-05-13 PROCEDURE — 99396 PREV VISIT EST AGE 40-64: CPT | Performed by: FAMILY MEDICINE

## 2022-05-13 RX ORDER — AMLODIPINE BESYLATE 10 MG/1
10 TABLET ORAL DAILY
Qty: 90 TABLET | Refills: 3 | Status: SHIPPED | OUTPATIENT
Start: 2022-05-13 | End: 2023-05-13

## 2022-05-13 NOTE — PATIENT INSTRUCTIONS
Please contact your insurance company and proceed with shingles vaccination as long as it is covered  Please proceed with 12 hour fasting blood work  Please schedule echo and stress test  Please increase dose of amlodipine from 5 to 10 mg daily  You are past due colonoscopy, please schedule  I recommend annual skin exam, referral to Dermatology

## 2022-05-13 NOTE — PROGRESS NOTES
FAMILY PRACTICE OFFICE VISIT       NAME: Tawny Ernandez  AGE: 61 y o  SEX: male       : 1965        MRN: [id number]        Assessment and Plan     1  Encounter for wellness examination in adult  -     Ambulatory referral to Dermatology; Future    2  Polyp of colon, unspecified part of colon, unspecified type  Assessment & Plan:  Last colonoscopy 2016  Colon polyp  Patient is past due, was supposed to repeat study in 2019, proceed now    Orders:  -     Ambulatory referral to Colorectal Surgery; Future    3  Colon cancer screening  -     Ambulatory referral to Colorectal Surgery; Future    4  Benign hypertension with CKD (chronic kidney disease) stage III [facility])  Assessment & Plan:  Lab Results   Component Value Date    EGFR 56 2021    EGFR 65 2021    EGFR 66 2021    CREATININE 1 39 (H) 2021    CREATININE 1 23 2021    CREATININE 1 22 2021     Blood pressure is elevated  I strongly advised patient to increase dose of amlodipine from 5 to 10 mg daily  He is scheduled for follow-up with nephrology next month  He will proceed with complete blood work including GFR/creatinine    Orders:  -     Comprehensive metabolic panel; Future  -     POCT ECG    5  MARLYN (generalized anxiety disorder)  Assessment & Plan:  Well controlled  Continue Lexapro 20 mg day      6  Dyslipidemia  -     CBC and differential; Future  -     Lipid Panel with Direct LDL reflex; Future  -     TSH, 3rd generation; Future    7  Prostate cancer screening  -     PSA, Total Screen; Future    8  Chest pain, atypical  Assessment & Plan:  Atypical chest discomfort described as " pinching"  that usually occurs at rest  No exertional symptoms  Patient denies angina, dyspnea, diaphoresis palpitations  EKG performed in the office today reveals no acute ischemic changes      Risk factors include age, gender, obesity, hypertension, CKD  Proceed with echo and stress test    Orders:  -     Echo complete w/ contrast if indicated; Future; Expected date: 05/13/2022  -     Stress test only, exercise; Future; Expected date: 05/13/2022    9  Benign hypertensive kidney disease with chronic kidney disease stage I through stage IV, or unspecified  -     amLODIPine (NORVASC) 10 mg tablet; Take 1 tablet (10 mg total) by mouth in the morning  Patient is interested in Shingrix vaccination  He will contact his insurance  As long as vaccination is covered, he will contact our office and will schedule vaccine with the nurse  BMI Counseling: Body mass index is 36 16 kg/m²  The BMI is above normal  Nutrition recommendations include encouraging healthy choices of fruits and vegetables, consuming healthier snacks, moderation in carbohydrate intake, reducing intake of saturated and trans fat and reducing intake of cholesterol  Exercise recommendations include exercising 3-5 times per week  No pharmacotherapy was ordered  Patient referred to PCP  Rationale for BMI follow-up plan is due to patient being overweight or obese  Depression Screening and Follow-up Plan: Patient was screened for depression during today's encounter  They screened negative with a PHQ-2 score of 0  Patient Instructions   Please contact your insurance company and proceed with shingles vaccination as long as it is covered  Please proceed with 12 hour fasting blood work  Please schedule echo and stress test  Please increase dose of amlodipine from 5 to 10 mg daily  You are past due colonoscopy, please schedule  I recommend annual skin exam, referral to Dermatology            Return in about 1 year (around 5/13/2023) for Annual physical/well exam     Discussed with the patient and all questioned fully answered  He will call me if any problems arise  M*Modal software was used to dictate this note  It may contain errors with dictating incorrect words/spelling  Please contact provider directly with any questions         Chief Complaint Chief Complaint   Patient presents with    Well Check       History of Present Illness     Annual well exam  Patient is scheduled for follow-up with nephrology next month - Arley Found  He has been experiencing intermittent chest discomfort within past few months  He describes it as very localized pinching chest pain in left anterior chest wall  Symptoms are intermittent  The not related to physical activity  Patient denies any associated shortness of breath, palpitations, diaphoresis or dyspnea  He denies any exertional chest discomfort, no symptoms of angina  Symptoms usually occur when he is resting  Patient offers no complaints of dizziness or headache  He remains on amlodipine for hypertension  During last office visit Nephrology he was advised to increase dose from 5 mg daily to 10 mg daily, patient is only taking 1 tablet (5 mg) amlodipine daily  Symptoms of chronic reflux are well controlled on Protonix  Patient remains on Lexapro for anxiety, it works very well  Review of Systems   Review of Systems   Constitutional: Negative  HENT: Negative  Eyes: Negative  Respiratory: Negative  Cardiovascular: Positive for chest pain (As per HPI)  Gastrointestinal: Negative  Endocrine: Negative  Genitourinary: Negative  Musculoskeletal: Negative  Skin: Negative  Allergic/Immunologic: Negative  Neurological: Negative  Hematological: Negative  Psychiatric/Behavioral: Negative          Active Problem List     Patient Active Problem List   Diagnosis    Acid reflux disease    Asthma    Benign hypertension with CKD (chronic kidney disease) stage III (HCC)    MARLYN (generalized anxiety disorder)    Stage 3a chronic kidney disease (HCC)    Kidney cysts    Chronic kidney disease-mineral and bone disorder    High aldosterone level    Hypokalemia    Vitamin D deficiency    IGT (impaired glucose tolerance)    Dyslipidemia    Umbilical hernia without obstruction and without gangrene    Class 2 obesity due to excess calories without serious comorbidity with body mass index (BMI) of 36 0 to 36 9 in adult    Polyp of colon    Chest pain, atypical       Past Medical History:  Past Medical History:   Diagnosis Date    Chronic kidney disease     "monitoring"    Colon polyp     Dental crowns present     veneers upper front 4    GERD (gastroesophageal reflux disease)     History of palpitations     "not recently"    History of pneumonia as a child     Hypertension     Motion sickness     Obesity     Shortness of breath     "with exertion, out of shape"    Umbilical hernia     Umbilical pain        Past Surgical History:  Past Surgical History:   Procedure Laterality Date    COLONOSCOPY      LYMPH NODE DISSECTION      "removed from throat as a child, told not cancer"    NV REPAIR UMBILICAL FOSL,6+M/D,XDCFM N/A 4/22/2021    Procedure: REPAIR HERNIA UMBILICAL WITH MESH;  Surgeon: Kathryn Otero MD;  Location: [facility];  Service: General    TONSILLECTOMY      VASECTOMY      Surgery Vas Deferens Vasectomy       Family History:  Family History   Problem Relation Age of Onset    Diabetes Father         Diabetes Mellitus    Hypertension Father     Breast cancer Sister        Social History:  Social History     Socioeconomic History    Marital status: /Civil Union     Spouse name: Not on file    Number of children: Not on file    Years of education: Not on file    Highest education level: Not on file   Occupational History    Not on file   Tobacco Use    Smoking status: Former Smoker     Types: Cigars    Smokeless tobacco: Never Used    Tobacco comment: "one year only during covid"   Vaping Use    Vaping Use: Never used   Substance and Sexual Activity    Alcohol use: Yes     Comment: social    Drug use: No    Sexual activity: Not on file   Other Topics Concern    Not on file   Social History Narrative    Not on file     Social Determinants of Health     Financial Resource Strain: Not on file   Food Insecurity: Not on file   Transportation Needs: Not on file   Physical Activity: Not on file   Stress: Not on file   Social Connections: Not on file   Intimate Partner Violence: Not on file   Housing Stability: Not on file         Objective     Vitals:    05/13/22 1045 05/13/22 1120   BP: 140/100 158/90   Pulse: 66    Resp: 18    Temp: (!) 97 3 °F (36 3 °C)    TempSrc: Temporal    SpO2: 97%    Weight: 107 kg (235 lb)    Height: 5' 7 6" (1 717 m)        Wt Readings from Last 3 Encounters:   05/13/22 107 kg (235 lb)   05/05/21 107 kg (235 lb 9 6 oz)   04/22/21 105 kg (231 lb)       Physical Exam  Vitals and nursing note reviewed  Constitutional:       General: He is not in acute distress  Appearance: Normal appearance  He is well-developed  He is not ill-appearing  HENT:      Head: Normocephalic and atraumatic  Eyes:      Conjunctiva/sclera: Conjunctivae normal    Neck:      Vascular: No carotid bruit  Cardiovascular:      Rate and Rhythm: Normal rate and regular rhythm  Heart sounds: Normal heart sounds  No murmur heard  Pulmonary:      Effort: Pulmonary effort is normal  No respiratory distress  Breath sounds: Normal breath sounds  No wheezing or rales  Abdominal:      General: Bowel sounds are normal  There is no distension or abdominal bruit  Palpations: Abdomen is soft  Tenderness: There is no abdominal tenderness  Hernia: No hernia is present  Musculoskeletal:         General: Normal range of motion  Cervical back: Neck supple  No rigidity  Right lower leg: No edema  Left lower leg: No edema  Skin:     General: Skin is warm  Neurological:      General: No focal deficit present  Mental Status: He is alert and oriented to person, place, and time  Cranial Nerves: No cranial nerve deficit     Psychiatric:         Mood and Affect: Mood normal          Behavior: Behavior normal          Thought Content: Thought content normal           Results for orders placed or performed in visit on 05/13/22   POCT ECG    Narrative    See MD notes    Impression    Normal sinus rhythm, 64 beats per minute, no acute ischemic changes, nonspecific T-wave changes in aVL  No changes since previous study in December 2021         Pertinent Laboratory/Diagnostic Studies:    Lab Results   Component Value Date    WBC 3 60 (L) 12/11/2021    HGB 15 0 12/11/2021    HCT 44 6 12/11/2021    MCV 86 12/11/2021     12/11/2021       Lab Results   Component Value Date    TSH 0 67 07/01/2020       Lab Results   Component Value Date    CHOL 181 01/08/2016     Lab Results   Component Value Date    TRIG 200 (H) 04/13/2021     Lab Results   Component Value Date    HDL 35 (L) 04/13/2021     Lab Results   Component Value Date    LDLCALC 106 (H) 04/13/2021     Lab Results   Component Value Date    HGBA1C 6 5 (H) 04/13/2021     Lab Results   Component Value Date    SODIUM 136 12/11/2021    K 3 5 12/11/2021     12/11/2021    CO2 27 12/11/2021    ANIONGAP 9 07/21/2014    AGAP 7 12/11/2021    BUN 12 12/11/2021    CREATININE 1 39 (H) 12/11/2021    GLUC 153 (H) 12/11/2021    GLUF 120 (H) 04/22/2021    CALCIUM 8 4 12/11/2021    AST 19 12/11/2021    ALT 26 12/11/2021    ALKPHOS 54 12/11/2021    PROT 6 8 01/08/2016    TP 7 4 12/11/2021    BILITOT 0 6 01/08/2016    TBILI 0 44 12/11/2021    EGFR 56 12/11/2021       Orders Placed This Encounter   Procedures    CBC and differential    Comprehensive metabolic panel    Lipid Panel with Direct LDL reflex    TSH, 3rd generation    PSA, Total Screen    Ambulatory referral to Colorectal Surgery    Ambulatory referral to Dermatology    Stress test only, exercise    POCT ECG    Echo complete w/ contrast if indicated       ALLERGIES:  No Known Allergies    Current Medications     Current Outpatient Medications   Medication Sig Dispense Refill    amLODIPine (NORVASC) 10 mg tablet Take 1 tablet (10 mg total) by mouth in the morning  90 tablet 3    escitalopram (LEXAPRO) 20 mg tablet take 1 tablet by mouth once daily 90 tablet 3    pantoprazole (PROTONIX) 40 mg tablet take 1 tablet by mouth every morning 1/2 HOUR before breakfast 90 tablet 3     No current facility-administered medications for this visit         Medications Discontinued During This Encounter   Medication Reason    amLODIPine (NORVASC) 5 mg tablet Duplicate order    Ascorbic Acid (vitamin C) 1000 MG tablet Therapy completed    ergocalciferol (ERGOCALCIFEROL) 1 25 MG (42049 UT) capsule Therapy completed    methocarbamol (ROBAXIN) 750 mg tablet Therapy completed    ondansetron (Zofran ODT) 4 mg disintegrating tablet Therapy completed    tamsulosin (FLOMAX) 0 4 mg Therapy completed    sodium chloride 1 g tablet Therapy completed    potassium chloride (K-DUR,KLOR-CON) 20 mEq tablet Therapy completed    amLODIPine (NORVASC) 5 mg tablet Reorder       Health Maintenance     Health Maintenance   Topic Date Due    Hepatitis C Screening  Never done    Pneumococcal Vaccine: Pediatrics (0 to 5 Years) and At-Risk Patients (6 to 59 Years) (1 - PCV) Never done    HIV Screening  Never done    DTaP,Tdap,and Td Vaccines (1 - Tdap) Never done    Colorectal Cancer Screening  08/22/2019    COVID-19 Vaccine (3 - Booster for Pfizer series) 09/19/2021    Influenza Vaccine (Season Ended) 09/01/2022    Depression Screening  05/13/2023    BMI: Adult  05/13/2023    Annual Physical  05/13/2023    BMI: Followup Plan  05/16/2023    HIB Vaccine  Aged Out    Hepatitis B Vaccine  Aged Out    IPV Vaccine  Aged Out    Hepatitis A Vaccine  Aged Out    Meningococcal ACWY Vaccine  Aged Out    HPV Vaccine  Aged Out       Immunization History   Administered Date(s) Administered    COVID-19 PFIZER VACCINE 0 3 ML IM 03/29/2021, 04/19/2021    Hep B, adult 07/09/2019    Influenza Quadrivalent Preservative Free 3 years and older IM 11/27/2015    Influenza, seasonal, injectable 11/29/2005       Facundo Tyler MD

## 2022-05-13 NOTE — ASSESSMENT & PLAN NOTE
Last colonoscopy August 2016  Colon polyp    Patient is past due, was supposed to repeat study in 2019, proceed now

## 2022-05-16 PROBLEM — R07.89 CHEST PAIN, ATYPICAL: Status: ACTIVE | Noted: 2022-05-16

## 2022-05-17 NOTE — ASSESSMENT & PLAN NOTE
Lab Results   Component Value Date    EGFR 56 12/11/2021    EGFR 65 04/22/2021    EGFR 66 04/13/2021    CREATININE 1 39 (H) 12/11/2021    CREATININE 1 23 04/22/2021    CREATININE 1 22 04/13/2021     Blood pressure is elevated  I strongly advised patient to increase dose of amlodipine from 5 to 10 mg daily  He is scheduled for follow-up with nephrology next month      He will proceed with complete blood work including GFR/creatinine

## 2022-05-17 NOTE — ASSESSMENT & PLAN NOTE
Atypical chest discomfort described as " pinching"  that usually occurs at rest  No exertional symptoms  Patient denies angina, dyspnea, diaphoresis palpitations  EKG performed in the office today reveals no acute ischemic changes      Risk factors include age, gender, obesity, hypertension, CKD  Proceed with echo and stress test

## 2022-06-28 ENCOUNTER — TELEPHONE (OUTPATIENT)
Dept: NEPHROLOGY | Facility: CLINIC | Age: 57
End: 2022-06-28

## 2022-06-28 ENCOUNTER — APPOINTMENT (OUTPATIENT)
Dept: LAB | Facility: CLINIC | Age: 57
End: 2022-06-28
Payer: COMMERCIAL

## 2022-06-28 DIAGNOSIS — N18.30 BENIGN HYPERTENSION WITH CKD (CHRONIC KIDNEY DISEASE) STAGE III (HCC): ICD-10-CM

## 2022-06-28 DIAGNOSIS — M89.9 CHRONIC KIDNEY DISEASE-MINERAL AND BONE DISORDER: ICD-10-CM

## 2022-06-28 DIAGNOSIS — N18.31 CHRONIC KIDNEY DISEASE (CKD) STAGE G3A/A2, MODERATELY DECREASED GLOMERULAR FILTRATION RATE (GFR) BETWEEN 45-59 ML/MIN/1.73 SQUARE METER AND ALBUMINURIA CREATININE RATIO BETWEEN 30-299 MG/G (HCC): ICD-10-CM

## 2022-06-28 DIAGNOSIS — E78.5 DYSLIPIDEMIA: ICD-10-CM

## 2022-06-28 DIAGNOSIS — E83.9 CHRONIC KIDNEY DISEASE-MINERAL AND BONE DISORDER: ICD-10-CM

## 2022-06-28 DIAGNOSIS — I12.9 BENIGN HYPERTENSION WITH CKD (CHRONIC KIDNEY DISEASE) STAGE III (HCC): ICD-10-CM

## 2022-06-28 DIAGNOSIS — N18.9 CHRONIC KIDNEY DISEASE-MINERAL AND BONE DISORDER: ICD-10-CM

## 2022-06-28 DIAGNOSIS — Z12.5 PROSTATE CANCER SCREENING: ICD-10-CM

## 2022-06-28 LAB
25(OH)D3 SERPL-MCNC: 24 NG/ML (ref 30–100)
ALBUMIN SERPL BCP-MCNC: 3.7 G/DL (ref 3.5–5)
ALP SERPL-CCNC: 70 U/L (ref 46–116)
ALT SERPL W P-5'-P-CCNC: 35 U/L (ref 12–78)
ANION GAP SERPL CALCULATED.3IONS-SCNC: 3 MMOL/L (ref 4–13)
AST SERPL W P-5'-P-CCNC: 16 U/L (ref 5–45)
BASOPHILS # BLD AUTO: 0.08 THOUSANDS/ΜL (ref 0–0.1)
BASOPHILS NFR BLD AUTO: 2 % (ref 0–1)
BILIRUB SERPL-MCNC: 0.79 MG/DL (ref 0.2–1)
BUN SERPL-MCNC: 13 MG/DL (ref 5–25)
CALCIUM SERPL-MCNC: 8.7 MG/DL (ref 8.3–10.1)
CHLORIDE SERPL-SCNC: 105 MMOL/L (ref 100–108)
CHOLEST SERPL-MCNC: 170 MG/DL
CO2 SERPL-SCNC: 31 MMOL/L (ref 21–32)
CREAT SERPL-MCNC: 1.31 MG/DL (ref 0.6–1.3)
EOSINOPHIL # BLD AUTO: 0.22 THOUSAND/ΜL (ref 0–0.61)
EOSINOPHIL NFR BLD AUTO: 4 % (ref 0–6)
ERYTHROCYTE [DISTWIDTH] IN BLOOD BY AUTOMATED COUNT: 12.9 % (ref 11.6–15.1)
GFR SERPL CREATININE-BSD FRML MDRD: 60 ML/MIN/1.73SQ M
GLUCOSE P FAST SERPL-MCNC: 141 MG/DL (ref 65–99)
HCT VFR BLD AUTO: 46.2 % (ref 36.5–49.3)
HDLC SERPL-MCNC: 32 MG/DL
HGB BLD-MCNC: 15.7 G/DL (ref 12–17)
IMM GRANULOCYTES # BLD AUTO: 0.02 THOUSAND/UL (ref 0–0.2)
IMM GRANULOCYTES NFR BLD AUTO: 0 % (ref 0–2)
LDLC SERPL CALC-MCNC: 98 MG/DL (ref 0–100)
LYMPHOCYTES # BLD AUTO: 1.41 THOUSANDS/ΜL (ref 0.6–4.47)
LYMPHOCYTES NFR BLD AUTO: 26 % (ref 14–44)
MAGNESIUM SERPL-MCNC: 2.6 MG/DL (ref 1.6–2.6)
MCH RBC QN AUTO: 29.6 PG (ref 26.8–34.3)
MCHC RBC AUTO-ENTMCNC: 34 G/DL (ref 31.4–37.4)
MCV RBC AUTO: 87 FL (ref 82–98)
MONOCYTES # BLD AUTO: 0.61 THOUSAND/ΜL (ref 0.17–1.22)
MONOCYTES NFR BLD AUTO: 11 % (ref 4–12)
NEUTROPHILS # BLD AUTO: 3.15 THOUSANDS/ΜL (ref 1.85–7.62)
NEUTS SEG NFR BLD AUTO: 57 % (ref 43–75)
NRBC BLD AUTO-RTO: 0 /100 WBCS
PHOSPHATE SERPL-MCNC: 2.7 MG/DL (ref 2.7–4.5)
PLATELET # BLD AUTO: 224 THOUSANDS/UL (ref 149–390)
PMV BLD AUTO: 9.9 FL (ref 8.9–12.7)
POTASSIUM SERPL-SCNC: 3.7 MMOL/L (ref 3.5–5.3)
PROT SERPL-MCNC: 7.5 G/DL (ref 6.4–8.2)
PSA SERPL-MCNC: 0.6 NG/ML (ref 0–4)
PTH-INTACT SERPL-MCNC: 76.1 PG/ML (ref 18.4–80.1)
RBC # BLD AUTO: 5.3 MILLION/UL (ref 3.88–5.62)
SODIUM SERPL-SCNC: 139 MMOL/L (ref 136–145)
TRIGL SERPL-MCNC: 199 MG/DL
TSH SERPL DL<=0.05 MIU/L-ACNC: 1.09 UIU/ML (ref 0.45–4.5)
WBC # BLD AUTO: 5.49 THOUSAND/UL (ref 4.31–10.16)

## 2022-06-28 PROCEDURE — 80053 COMPREHEN METABOLIC PANEL: CPT

## 2022-06-28 PROCEDURE — 84100 ASSAY OF PHOSPHORUS: CPT

## 2022-06-28 PROCEDURE — 83735 ASSAY OF MAGNESIUM: CPT

## 2022-06-28 PROCEDURE — 80061 LIPID PANEL: CPT

## 2022-06-28 PROCEDURE — 84443 ASSAY THYROID STIM HORMONE: CPT

## 2022-06-28 PROCEDURE — G0103 PSA SCREENING: HCPCS

## 2022-06-28 PROCEDURE — 36415 COLL VENOUS BLD VENIPUNCTURE: CPT

## 2022-06-28 PROCEDURE — 85025 COMPLETE CBC W/AUTO DIFF WBC: CPT

## 2022-06-28 PROCEDURE — 83970 ASSAY OF PARATHORMONE: CPT

## 2022-06-28 PROCEDURE — 82306 VITAMIN D 25 HYDROXY: CPT

## 2022-06-28 NOTE — TELEPHONE ENCOUNTER
Message left on patient's VM that kidney function is stable per Dr Gerry Hunt (labs of  6/28/22)          ----- Message from Torsten Estes MD sent at 6/28/2022 12:58 PM EDT -----  Please inform patient that kidney function is stable based on latest lab tests (6/28/22)

## 2022-07-01 ENCOUNTER — TELEPHONE (OUTPATIENT)
Dept: FAMILY MEDICINE CLINIC | Facility: CLINIC | Age: 57
End: 2022-07-01

## 2022-07-01 DIAGNOSIS — R73.02 IGT (IMPAIRED GLUCOSE TOLERANCE): Primary | ICD-10-CM

## 2022-07-01 NOTE — TELEPHONE ENCOUNTER
Please contact patient  All blood work was essentially normal/stable but his blood sugars elevated at 141  Ideally, blood sugar should be below 115, any value over 126 could be considered diabetes  Patient should start low-carbohydrate diet and try to lose weight  I recommend to repeat fasting blood sugar along with hemoglobin A1c in 1 month for monitoring  I believe he can improve his values with lifestyle changes      Thank you

## 2022-07-08 NOTE — TELEPHONE ENCOUNTER
3rd Call  Spoke with patient  Made aware of results and provider's instructions  Patient verbalized understanding and was agreeable w/ plan

## 2022-07-27 ENCOUNTER — TELEPHONE (OUTPATIENT)
Dept: NEPHROLOGY | Facility: CLINIC | Age: 57
End: 2022-07-27

## 2022-07-28 ENCOUNTER — TELEMEDICINE (OUTPATIENT)
Dept: FAMILY MEDICINE CLINIC | Facility: CLINIC | Age: 57
End: 2022-07-28
Payer: COMMERCIAL

## 2022-07-28 DIAGNOSIS — U07.1 COVID-19 VIRUS INFECTION: Primary | ICD-10-CM

## 2022-07-28 PROCEDURE — 99213 OFFICE O/P EST LOW 20 MIN: CPT | Performed by: FAMILY MEDICINE

## 2022-07-28 PROCEDURE — 3725F SCREEN DEPRESSION PERFORMED: CPT | Performed by: FAMILY MEDICINE

## 2022-07-28 NOTE — PROGRESS NOTES
Virtual Regular Visit    Verification of patient location:    Patient is located in the following state in which I hold an active license [place]      Assessment/Plan:    Problem List Items Addressed This Visit        Other    COVID-19 virus infection - Primary     COVID-19 infection  Patient was recommended to take Paxlovid antiviral pack for 5 days  He may use over-the-counter medications as necessary for symptom relief  Patient will call if he develops any new symptoms or worsening of symptoms  Relevant Medications    nirmatrelvir & ritonavir (Paxlovid) tablet therapy pack               Reason for visit is   Chief Complaint   Patient presents with    COVID-19     Tested positive yesterday, 7/27th; + chest tightness, + productive cough, + Runny nose, + HA, + ST        Encounter provider Alistair Urbano MD    Provider located at [address] [place] [id number]      Recent Visits  No visits were found meeting these conditions  Showing recent visits within past 7 days and meeting all other requirements  Today's Visits  Date Type Provider Dept   07/28/22 Telemedicine Alistair Urbano MD [facility] today's visits and meeting all other requirements  Future Appointments  No visits were found meeting these conditions  Showing future appointments within next 150 days and meeting all other requirements       The patient was identified by name and date of birth  Kika Shahid was informed that this is a telemedicine visit and that the visit is being conducted through [address] [place] [address] Now and patient was informed that this is a secure, HIPAA-compliant platform  He agrees to proceed     My office door was closed  No one else was in the room  He acknowledged consent and understanding of privacy and security of the video platform  The patient has agreed to participate and understands they can discontinue the visit at any time      Patient is aware this is a billable service  Subjective  Antoine Pickens is a 61 y o  male       Patient having telemedicine visit after testing positive for COVID-19 infection today  Patient states yesterday he began with symptoms of coughing, chest tightness, headache, and sore throat  Denies any documented fevers  He believes he may have been exposed to his girlfriend's son who came to visit him recently  Patient has had 2 COVID vaccinations and also had a bout of COVID-19 infection in December 2021  Patient works from home normally so does not come in contact with many coworkers  Past Medical History:   Diagnosis Date    Chronic kidney disease     "monitoring"    Colon polyp     Dental crowns present     veneers upper front 4    GERD (gastroesophageal reflux disease)     History of palpitations     "not recently"    History of pneumonia as a child     Hypertension     Motion sickness     Obesity     Shortness of breath     "with exertion, out of shape"    Umbilical hernia     Umbilical pain        Past Surgical History:   Procedure Laterality Date    COLONOSCOPY      LYMPH NODE DISSECTION      "removed from throat as a child, told not cancer"    MI REPAIR UMBILICAL IGML,8+Z/S,PXPFN N/A 4/22/2021    Procedure: REPAIR HERNIA UMBILICAL WITH MESH;  Surgeon: Shyanne Love MD;  Location: [facility];  Service: General    TONSILLECTOMY      VASECTOMY      Surgery Vas Deferens Vasectomy       Current Outpatient Medications   Medication Sig Dispense Refill    amLODIPine (NORVASC) 10 mg tablet Take 1 tablet (10 mg total) by mouth in the morning   90 tablet 3    escitalopram (LEXAPRO) 20 mg tablet take 1 tablet by mouth once daily 90 tablet 3    nirmatrelvir & ritonavir (Paxlovid) tablet therapy pack Take 3 tablets by mouth 2 (two) times a day for 5 days Take 2 nirmatrelvir tablets + 1 ritonavir tablet together per dose 30 tablet 0    pantoprazole (PROTONIX) 40 mg tablet take 1 tablet by mouth every morning 1/2 HOUR before breakfast 90 tablet 3     No current facility-administered medications for this visit  No Known Allergies    Review of Systems   Constitutional: Positive for fatigue  Negative for fever  HENT: Positive for sore throat  Respiratory: Positive for cough and chest tightness  Cardiovascular: Negative  Gastrointestinal: Negative  Musculoskeletal: Negative  Neurological: Positive for headaches  Video Exam    Vitals:       Physical Exam  Constitutional:       General: He is not in acute distress  Appearance: Normal appearance  He is not ill-appearing  Eyes:      General:         Right eye: No discharge  Extraocular Movements: Extraocular movements intact  Conjunctiva/sclera: Conjunctivae normal       Pupils: Pupils are equal, round, and reactive to light  Pulmonary:      Effort: No respiratory distress  Comments: Harsh cough  Neurological:      General: No focal deficit present  Mental Status: He is alert and oriented to person, place, and time  Psychiatric:         Mood and Affect: Mood normal          Behavior: Behavior normal          Thought Content: Thought content normal          Judgment: Judgment normal           I spent 15 minutes directly with the patient during this visit    VIRTUAL VISIT DISCLAIMER      Chandler Delgadojeremíasosmar verbally agrees to participate in [employer]  Pt is aware that [employer] could be limited without vital signs or the ability to perform a full hands-on physical exam  Amador Leigh understands he or the provider may request at any time to terminate the video visit and request the patient to seek care or treatment in person

## 2022-07-28 NOTE — ASSESSMENT & PLAN NOTE
COVID-19 infection  Patient was recommended to take Paxlovid antiviral pack for 5 days  He may use over-the-counter medications as necessary for symptom relief  Patient will call if he develops any new symptoms or worsening of symptoms

## 2022-08-15 ENCOUNTER — OFFICE VISIT (OUTPATIENT)
Dept: NEPHROLOGY | Facility: CLINIC | Age: 57
End: 2022-08-15
Payer: COMMERCIAL

## 2022-08-15 VITALS
WEIGHT: 232 LBS | HEIGHT: 68 IN | DIASTOLIC BLOOD PRESSURE: 70 MMHG | SYSTOLIC BLOOD PRESSURE: 112 MMHG | BODY MASS INDEX: 35.16 KG/M2

## 2022-08-15 DIAGNOSIS — N18.31 STAGE 3A CHRONIC KIDNEY DISEASE (HCC): Primary | ICD-10-CM

## 2022-08-15 DIAGNOSIS — R79.89 HIGH ALDOSTERONE LEVEL: ICD-10-CM

## 2022-08-15 DIAGNOSIS — I12.9 BENIGN HYPERTENSION WITH CKD (CHRONIC KIDNEY DISEASE) STAGE III (HCC): ICD-10-CM

## 2022-08-15 DIAGNOSIS — E55.9 VITAMIN D DEFICIENCY: ICD-10-CM

## 2022-08-15 DIAGNOSIS — N18.30 BENIGN HYPERTENSION WITH CKD (CHRONIC KIDNEY DISEASE) STAGE III (HCC): ICD-10-CM

## 2022-08-15 PROCEDURE — 99214 OFFICE O/P EST MOD 30 MIN: CPT | Performed by: INTERNAL MEDICINE

## 2022-08-15 RX ORDER — SODIUM CHLORIDE 1000 MG
1 TABLET, SOLUBLE MISCELLANEOUS 3 TIMES DAILY
Qty: 15 TABLET | Refills: 0 | Status: SHIPPED | OUTPATIENT
Start: 2022-08-15

## 2022-08-15 NOTE — PROGRESS NOTES
15 [facility] OFFICE PROGRESS NOTE   Polly Jimenez 61 y o  male MRN: [id number]  DATE: 08/15/22  Reason for visit: Continued evaluation and management of CKD    ASSESSMENT & PLAN:  1  Chronic kidney disease, stage IIIA:  · Yaakov's baseline serum creatinine is around 1 2-1 4   · Etiology of CKD is suspected to be hypertensive nephrosclerosis  · Renal function is stable  SCr 1 31    · Continue with good BP control       2  Hypertension:  · BP is at goal    · Current regimen: Amlodipine 10 mg daily  · Back in July 2020 before I saw him, Aldosterone level was 17 which is elevated while renin was suppressed at 0 21 - possible hyperaldosteronism  · He has not gone for the 24 hour urine for aldosterone in the setting of salt loading yet  · Will reorder  If hyperaldosteronism is confirmed, he will need adrenal vein sampling  · No changes for today       3  Hypokalemia:  · Resolved  · Not on K supplements  4  Nephrolithiasis:    · Will eventually need a 24 hour urine for stone risk profile  · Defer until next visit  5  Mineral and bone disease:  · PTH is at goal - 76 1 in June 2022  · Ca and phos are at goal  · Vitamin D is low - 24 0 in June 2022  Start Vitamin D 2000 units once a day  Patient Instructions   Your kidney function is stable  Your aldosterone level is elevated which could lead to high blood pressure  Please start salt tablets 1 tablet three times a day for 4 days  On the 4th day, please do a 24 hour urine for aldosterone, creatinine and sodium  Start Vitamin D 2000 units daily  Follow up in 6 months  SUBJECTIVE / INTERVAL HISTORY:  Luz Dias was last seen via telemed in Dec 2020  Since his last office visit, there have been no recent hospitalizations  He did have an ER visit in December 2021 when he was diagnosed with COVID-19 infection  Fortunately, he did not require hospitalization for that  He also passed 2 kidney stones over the past year and a half     Home BP has been in the 130s over 90s  He feels okay  No new complaints  PMH/PSH: HTN, GERD, anxiety, DDD    Previous work up:   1/9/19 Renal US: R 11 5 cm, L 11 6 cm, (+) simple cysts  7/2/1/2020 Aldosterone 17, renin 0 21  ALLERGIES: No Known Allergies    REVIEW OF SYSTEMS:  Review of Systems   Constitutional: Negative for appetite change, chills, fatigue and fever  Respiratory: Positive for cough  Negative for shortness of breath  Cardiovascular: Positive for leg swelling  Negative for chest pain  Gastrointestinal: Negative for abdominal pain, diarrhea, nausea and vomiting  Genitourinary: Negative for dysuria and hematuria  Musculoskeletal: Positive for back pain  Negative for arthralgias  Neurological: Negative for dizziness and light-headedness  OBJECTIVE:  /70   Ht 5' 8" (1 727 m)   Wt 105 kg (232 lb)   BMI 35 28 kg/m²   Current Weight:   There is no height or weight on file to calculate BMI  Physical Exam  Constitutional:       General: He is not in acute distress  Appearance: Normal appearance  He is well-developed and normal weight  HENT:      Head: Normocephalic and atraumatic  Eyes:      General: No scleral icterus  Conjunctiva/sclera: Conjunctivae normal    Neck:      Vascular: No JVD  Cardiovascular:      Rate and Rhythm: Normal rate and regular rhythm  Heart sounds: Normal heart sounds  Pulmonary:      Effort: Pulmonary effort is normal  No respiratory distress  Breath sounds: Normal breath sounds  Abdominal:      General: Bowel sounds are normal       Palpations: Abdomen is soft  Musculoskeletal:      Cervical back: Neck supple  Right lower leg: No edema  Left lower leg: No edema  Skin:     General: Skin is warm and dry  Neurological:      Mental Status: He is alert and oriented to person, place, and time     Psychiatric:         Behavior: Behavior normal        Medications:  Current Outpatient Medications:     amLODIPine (NORVASC) 10 mg tablet, Take 1 tablet (10 mg total) by mouth in the morning , Disp: 90 tablet, Rfl: 3    escitalopram (LEXAPRO) 20 mg tablet, take 1 tablet by mouth once daily, Disp: 90 tablet, Rfl: 3    pantoprazole (PROTONIX) 40 mg tablet, take 1 tablet by mouth every morning 1/2 HOUR before breakfast, Disp: 90 tablet, Rfl: 3    Laboratory Results:  Results for orders placed or performed in visit on 06/28/22   CBC and differential   Result Value Ref Range    WBC 5 49 4 31 - 10 16 Thousand/uL    RBC 5 30 3 88 - 5 62 Million/uL    Hemoglobin 15 7 12 0 - 17 0 g/dL    Hematocrit 46 2 36 5 - 49 3 %    MCV 87 82 - 98 fL    MCH 29 6 26 8 - 34 3 pg    MCHC 34 0 31 4 - 37 4 g/dL    RDW 12 9 11 6 - 15 1 %    MPV 9 9 8 9 - 12 7 fL    Platelets 534 810 - 973 Thousands/uL    nRBC 0 /100 WBCs    Neutrophils Relative 57 43 - 75 %    Immat GRANS % 0 0 - 2 %    Lymphocytes Relative 26 14 - 44 %    Monocytes Relative 11 4 - 12 %    Eosinophils Relative 4 0 - 6 %    Basophils Relative 2 (H) 0 - 1 %    Neutrophils Absolute 3 15 1 85 - 7 62 Thousands/µL    Immature Grans Absolute 0 02 0 00 - 0 20 Thousand/uL    Lymphocytes Absolute 1 41 0 60 - 4 47 Thousands/µL    Monocytes Absolute 0 61 0 17 - 1 22 Thousand/µL    Eosinophils Absolute 0 22 0 00 - 0 61 Thousand/µL    Basophils Absolute 0 08 0 00 - 0 10 Thousands/µL   Comprehensive metabolic panel   Result Value Ref Range    Sodium 139 136 - 145 mmol/L    Potassium 3 7 3 5 - 5 3 mmol/L    Chloride 105 100 - 108 mmol/L    CO2 31 21 - 32 mmol/L    ANION GAP 3 (L) 4 - 13 mmol/L    BUN 13 5 - 25 mg/dL    Creatinine 1 31 (H) 0 60 - 1 30 mg/dL    Glucose, Fasting 141 (H) 65 - 99 mg/dL    Calcium 8 7 8 3 - 10 1 mg/dL    AST 16 5 - 45 U/L    ALT 35 12 - 78 U/L    Alkaline Phosphatase 70 46 - 116 U/L    Total Protein 7 5 6 4 - 8 2 g/dL    Albumin 3 7 3 5 - 5 0 g/dL    Total Bilirubin 0 79 0 20 - 1 00 mg/dL    eGFR 60 ml/min/1 73sq m   Lipid Panel with Direct LDL reflex   Result Value Ref Range Cholesterol 170 See Comment mg/dL    Triglycerides 199 (H) See Comment mg/dL    HDL, Direct 32 (L) >=40 mg/dL    LDL Calculated 98 0 - 100 mg/dL   TSH, 3rd generation   Result Value Ref Range    TSH 3RD GENERATON 1 090 0 450 - 4 500 uIU/mL   PSA, Total Screen   Result Value Ref Range    PSA 0 6 0 0 - 4 0 ng/mL   Magnesium   Result Value Ref Range    Magnesium 2 6 1 6 - 2 6 mg/dL   PTH, intact   Result Value Ref Range    PTH 76 1 18 4 - 80 1 pg/mL   Vitamin D 25 hydroxy   Result Value Ref Range    Vit D, 25-Hydroxy 24 0 (L) 30 0 - 100 0 ng/mL   Phosphorus   Result Value Ref Range    Phosphorus 2 7 2 7 - 4 5 mg/dL

## 2022-08-15 NOTE — LETTER
August 15, 2022     Sp Velazquez MD  [id number]    Patient: Farzad Dickey   YOB: 1965   Date of Visit: 8/15/2022       Dear Dr Sandra Abbott: Thank you for referring Isaiah Lind to me for evaluation  Below are my notes for this consultation  If you have questions, please do not hesitate to call me  I look forward to following your patient along with you  Sincerely,        Luis E Phoenix MD        CC: No Recipients  Luis E Phoenix MD  8/15/2022  1:43 PM  Sign when Signing Visit  15 [facility] OFFICE PROGRESS NOTE   Farzad Dickey 61 y o  male MRN: [id number]  DATE: 08/15/22  Reason for visit: Continued evaluation and management of CKD    ASSESSMENT & PLAN:  1  Chronic kidney disease, stage IIIA:  · Yaakov's baseline serum creatinine is around 1 2-1 4   · Etiology of CKD is suspected to be hypertensive nephrosclerosis  · Renal function is stable  SCr 1 31    · Continue with good BP control       2  Hypertension:  · BP is at goal    · Current regimen: Amlodipine 10 mg daily  · Back in July 2020 before I saw him, Aldosterone level was 17 which is elevated while renin was suppressed at 0 21 - possible hyperaldosteronism  · He has not gone for the 24 hour urine for aldosterone in the setting of salt loading yet  · Will reorder  If hyperaldosteronism is confirmed, he will need adrenal vein sampling  · No changes for today       3  Hypokalemia:  · Resolved  · Not on K supplements  4  Nephrolithiasis:    · Will eventually need a 24 hour urine for stone risk profile  · Defer until next visit  5  Mineral and bone disease:  · PTH is at goal - 76 1 in June 2022  · Ca and phos are at goal  · Vitamin D is low - 24 0 in June 2022  Start Vitamin D 2000 units once a day  Patient Instructions   Your kidney function is stable  Your aldosterone level is elevated which could lead to high blood pressure    Please start salt tablets 1 tablet three times a day for 4 days  On the 4th day, please do a 24 hour urine for aldosterone, creatinine and sodium  Start Vitamin D 2000 units daily  Follow up in 6 months  SUBJECTIVE / INTERVAL HISTORY:  Mackenzie Armijo was last seen via telemed in Dec 2020  Since his last office visit, there have been no recent hospitalizations  He did have an ER visit in December 2021 when he was diagnosed with COVID-19 infection  Fortunately, he did not require hospitalization for that  He also passed 2 kidney stones over the past year and a half  Home BP has been in the 130s over 90s  He feels okay  No new complaints  PMH/PSH: HTN, GERD, anxiety, DDD    Previous work up:   1/9/19 Renal US: R 11 5 cm, L 11 6 cm, (+) simple cysts  7/2/1/2020 Aldosterone 17, renin 0 21  ALLERGIES: No Known Allergies    REVIEW OF SYSTEMS:  Review of Systems   Constitutional: Negative for appetite change, chills, fatigue and fever  Respiratory: Positive for cough  Negative for shortness of breath  Cardiovascular: Positive for leg swelling  Negative for chest pain  Gastrointestinal: Negative for abdominal pain, diarrhea, nausea and vomiting  Genitourinary: Negative for dysuria and hematuria  Musculoskeletal: Positive for back pain  Negative for arthralgias  Neurological: Negative for dizziness and light-headedness  OBJECTIVE:  /70   Ht 5' 8" (1 727 m)   Wt 105 kg (232 lb)   BMI 35 28 kg/m²   Current Weight:   There is no height or weight on file to calculate BMI  Physical Exam  Constitutional:       General: He is not in acute distress  Appearance: Normal appearance  He is well-developed and normal weight  HENT:      Head: Normocephalic and atraumatic  Eyes:      General: No scleral icterus  Conjunctiva/sclera: Conjunctivae normal    Neck:      Vascular: No JVD  Cardiovascular:      Rate and Rhythm: Normal rate and regular rhythm  Heart sounds: Normal heart sounds     Pulmonary:      Effort: Pulmonary effort is normal  No respiratory distress  Breath sounds: Normal breath sounds  Abdominal:      General: Bowel sounds are normal       Palpations: Abdomen is soft  Musculoskeletal:      Cervical back: Neck supple  Right lower leg: No edema  Left lower leg: No edema  Skin:     General: Skin is warm and dry  Neurological:      Mental Status: He is alert and oriented to person, place, and time     Psychiatric:         Behavior: Behavior normal        Medications:  Current Outpatient Medications:     amLODIPine (NORVASC) 10 mg tablet, Take 1 tablet (10 mg total) by mouth in the morning , Disp: 90 tablet, Rfl: 3    escitalopram (LEXAPRO) 20 mg tablet, take 1 tablet by mouth once daily, Disp: 90 tablet, Rfl: 3    pantoprazole (PROTONIX) 40 mg tablet, take 1 tablet by mouth every morning 1/2 HOUR before breakfast, Disp: 90 tablet, Rfl: 3    Laboratory Results:  Results for orders placed or performed in visit on 06/28/22   CBC and differential   Result Value Ref Range    WBC 5 49 4 31 - 10 16 Thousand/uL    RBC 5 30 3 88 - 5 62 Million/uL    Hemoglobin 15 7 12 0 - 17 0 g/dL    Hematocrit 46 2 36 5 - 49 3 %    MCV 87 82 - 98 fL    MCH 29 6 26 8 - 34 3 pg    MCHC 34 0 31 4 - 37 4 g/dL    RDW 12 9 11 6 - 15 1 %    MPV 9 9 8 9 - 12 7 fL    Platelets 840 885 - 806 Thousands/uL    nRBC 0 /100 WBCs    Neutrophils Relative 57 43 - 75 %    Immat GRANS % 0 0 - 2 %    Lymphocytes Relative 26 14 - 44 %    Monocytes Relative 11 4 - 12 %    Eosinophils Relative 4 0 - 6 %    Basophils Relative 2 (H) 0 - 1 %    Neutrophils Absolute 3 15 1 85 - 7 62 Thousands/µL    Immature Grans Absolute 0 02 0 00 - 0 20 Thousand/uL    Lymphocytes Absolute 1 41 0 60 - 4 47 Thousands/µL    Monocytes Absolute 0 61 0 17 - 1 22 Thousand/µL    Eosinophils Absolute 0 22 0 00 - 0 61 Thousand/µL    Basophils Absolute 0 08 0 00 - 0 10 Thousands/µL   Comprehensive metabolic panel   Result Value Ref Range    Sodium 139 136 - 145 mmol/L    Potassium 3 7 3 5 - 5 3 mmol/L    Chloride 105 100 - 108 mmol/L    CO2 31 21 - 32 mmol/L    ANION GAP 3 (L) 4 - 13 mmol/L    BUN 13 5 - 25 mg/dL    Creatinine 1 31 (H) 0 60 - 1 30 mg/dL    Glucose, Fasting 141 (H) 65 - 99 mg/dL    Calcium 8 7 8 3 - 10 1 mg/dL    AST 16 5 - 45 U/L    ALT 35 12 - 78 U/L    Alkaline Phosphatase 70 46 - 116 U/L    Total Protein 7 5 6 4 - 8 2 g/dL    Albumin 3 7 3 5 - 5 0 g/dL    Total Bilirubin 0 79 0 20 - 1 00 mg/dL    eGFR 60 ml/min/1 73sq m   Lipid Panel with Direct LDL reflex   Result Value Ref Range    Cholesterol 170 See Comment mg/dL    Triglycerides 199 (H) See Comment mg/dL    HDL, Direct 32 (L) >=40 mg/dL    LDL Calculated 98 0 - 100 mg/dL   TSH, 3rd generation   Result Value Ref Range    TSH 3RD GENERATON 1 090 0 450 - 4 500 uIU/mL   PSA, Total Screen   Result Value Ref Range    PSA 0 6 0 0 - 4 0 ng/mL   Magnesium   Result Value Ref Range    Magnesium 2 6 1 6 - 2 6 mg/dL   PTH, intact   Result Value Ref Range    PTH 76 1 18 4 - 80 1 pg/mL   Vitamin D 25 hydroxy   Result Value Ref Range    Vit D, 25-Hydroxy 24 0 (L) 30 0 - 100 0 ng/mL   Phosphorus   Result Value Ref Range    Phosphorus 2 7 2 7 - 4 5 mg/dL

## 2022-08-15 NOTE — PATIENT INSTRUCTIONS
Your kidney function is stable  Your aldosterone level is elevated which could lead to high blood pressure  Please start salt tablets 1 tablet three times a day for 4 days  On the 4th day, please do a 24 hour urine for aldosterone, creatinine and sodium  Start Vitamin D 2000 units daily  Follow up in 6 months

## 2022-10-03 ENCOUNTER — TELEPHONE (OUTPATIENT)
Dept: FAMILY MEDICINE CLINIC | Facility: CLINIC | Age: 57
End: 2022-10-03

## 2022-11-29 ENCOUNTER — LAB (OUTPATIENT)
Dept: LAB | Facility: CLINIC | Age: 57
End: 2022-11-29

## 2022-11-29 DIAGNOSIS — R73.02 IGT (IMPAIRED GLUCOSE TOLERANCE): ICD-10-CM

## 2022-11-29 LAB
ANION GAP SERPL CALCULATED.3IONS-SCNC: 7 MMOL/L (ref 4–13)
BUN SERPL-MCNC: 15 MG/DL (ref 5–25)
CALCIUM SERPL-MCNC: 8.8 MG/DL (ref 8.3–10.1)
CHLORIDE SERPL-SCNC: 106 MMOL/L (ref 96–108)
CO2 SERPL-SCNC: 28 MMOL/L (ref 21–32)
CREAT SERPL-MCNC: 1.4 MG/DL (ref 0.6–1.3)
GFR SERPL CREATININE-BSD FRML MDRD: 54 ML/MIN/1.73SQ M
GLUCOSE P FAST SERPL-MCNC: 120 MG/DL (ref 65–99)
POTASSIUM SERPL-SCNC: 3.2 MMOL/L (ref 3.5–5.3)
SODIUM SERPL-SCNC: 141 MMOL/L (ref 135–147)

## 2022-11-29 NOTE — RESULT ENCOUNTER NOTE
Please contact patient  Kidney function is stable  Potassium level is decreased  I did forward results of this blood work to his nephrologist, Dr Ailyn Ambriz  Please advise patient to contact his office tomorrow for further directions      Thank you

## 2022-11-30 ENCOUNTER — TELEPHONE (OUTPATIENT)
Dept: NEPHROLOGY | Facility: CLINIC | Age: 57
End: 2022-11-30

## 2022-11-30 LAB
EST. AVERAGE GLUCOSE BLD GHB EST-MCNC: 131 MG/DL
HBA1C MFR BLD: 6.2 %

## 2022-11-30 NOTE — TELEPHONE ENCOUNTER
12/8/22  Left another detailed  Message for patient to start KDur 20 mEq daily and to follow high K diet per Dr Jonatan Barrientos  Also questioning if 24 hour urine studies were completed  (will send letter as well)      Left message for patient to return my call to relay Dr Osmar Quiles recommendations     ----- Message from Seng Dhillon MD sent at 11/30/2022 11:21 AM EST -----  Please inform patient that his potassium was low on latest lab tests (11/29/22)  Please have him start Kdur 20 meq daily and go on a high potassium diet  Please ask him if he went for the 24 hour urine tests that I requested

## 2022-12-08 DIAGNOSIS — E87.6 HYPOKALEMIA: Primary | ICD-10-CM

## 2022-12-08 RX ORDER — POTASSIUM CHLORIDE 20 MEQ/1
20 TABLET, EXTENDED RELEASE ORAL DAILY
Qty: 90 TABLET | Refills: 1 | Status: SHIPPED | OUTPATIENT
Start: 2022-12-08

## 2022-12-08 RX ORDER — POTASSIUM CHLORIDE 20 MEQ/1
20 TABLET, EXTENDED RELEASE ORAL DAILY
COMMUNITY
End: 2022-12-08 | Stop reason: SDUPTHER

## 2022-12-22 DIAGNOSIS — K21.9 CHRONIC GERD: ICD-10-CM

## 2022-12-22 RX ORDER — PANTOPRAZOLE SODIUM 40 MG/1
TABLET, DELAYED RELEASE ORAL
Qty: 90 TABLET | Refills: 3 | Status: SHIPPED | OUTPATIENT
Start: 2022-12-22

## 2022-12-27 DIAGNOSIS — F41.9 ANXIETY: ICD-10-CM

## 2022-12-27 RX ORDER — ESCITALOPRAM OXALATE 20 MG/1
TABLET ORAL
Qty: 90 TABLET | Refills: 3 | Status: SHIPPED | OUTPATIENT
Start: 2022-12-27

## 2023-02-20 ENCOUNTER — TELEPHONE (OUTPATIENT)
Dept: NEPHROLOGY | Facility: CLINIC | Age: 58
End: 2023-02-20

## 2023-02-20 NOTE — TELEPHONE ENCOUNTER
Called pt to try to schedule sooner appt with Dr Rani Wooten since his follow up is supposed to be for Feb  His appt is in April  Pt didn't  so I left a vm

## 2023-03-03 NOTE — TELEPHONE ENCOUNTER
2nd call to pt to try to get him in sooner for his f/u  Left vm  Seen a spot open on March 23rd @ 9:30 with Cori in the [place] office

## 2023-04-19 ENCOUNTER — APPOINTMENT (OUTPATIENT)
Dept: LAB | Facility: CLINIC | Age: 58
End: 2023-04-19

## 2023-04-19 DIAGNOSIS — R79.89 HIGH ALDOSTERONE LEVEL: ICD-10-CM

## 2023-04-19 DIAGNOSIS — N18.31 STAGE 3A CHRONIC KIDNEY DISEASE (HCC): ICD-10-CM

## 2023-04-19 LAB
25(OH)D3 SERPL-MCNC: 27.5 NG/ML (ref 30–100)
ALBUMIN SERPL BCP-MCNC: 4.2 G/DL (ref 3.5–5)
ANION GAP SERPL CALCULATED.3IONS-SCNC: 4 MMOL/L (ref 4–13)
BUN SERPL-MCNC: 16 MG/DL (ref 5–25)
CALCIUM SERPL-MCNC: 9.5 MG/DL (ref 8.3–10.1)
CHLORIDE SERPL-SCNC: 103 MMOL/L (ref 96–108)
CO2 SERPL-SCNC: 29 MMOL/L (ref 21–32)
CREAT SERPL-MCNC: 1.37 MG/DL (ref 0.6–1.3)
GFR SERPL CREATININE-BSD FRML MDRD: 56 ML/MIN/1.73SQ M
GLUCOSE P FAST SERPL-MCNC: 121 MG/DL (ref 65–99)
PHOSPHATE SERPL-MCNC: 3.3 MG/DL (ref 2.7–4.5)
POTASSIUM SERPL-SCNC: 3.6 MMOL/L (ref 3.5–5.3)
SODIUM SERPL-SCNC: 136 MMOL/L (ref 135–147)

## 2023-04-21 LAB
CREAT 24H UR-MRATE: 0.7 G/24HR (ref 0.8–1.8)
PERIOD: 24 HOURS
SODIUM 24H UR-SRATE: 70.95 MMOL/24 HRS (ref 40–220)
SPECIMEN VOL UR: 550 ML
SPECIMEN VOL UR: 550 ML

## 2023-04-30 LAB
ALDOST 24H UR-MRATE: 4.97 UG/24 HR (ref 0–19)
ALDOST UR-MCNC: 9.04 UG/L

## 2023-05-01 ENCOUNTER — OFFICE VISIT (OUTPATIENT)
Dept: FAMILY MEDICINE CLINIC | Facility: CLINIC | Age: 58
End: 2023-05-01

## 2023-05-01 VITALS
BODY MASS INDEX: 35.77 KG/M2 | TEMPERATURE: 97.5 F | HEART RATE: 67 BPM | WEIGHT: 236 LBS | OXYGEN SATURATION: 97 % | HEIGHT: 68 IN | RESPIRATION RATE: 16 BRPM | DIASTOLIC BLOOD PRESSURE: 82 MMHG | SYSTOLIC BLOOD PRESSURE: 136 MMHG

## 2023-05-01 DIAGNOSIS — G89.29 CHRONIC BILATERAL LOW BACK PAIN WITHOUT SCIATICA: Primary | ICD-10-CM

## 2023-05-01 DIAGNOSIS — Z12.11 COLON CANCER SCREENING: ICD-10-CM

## 2023-05-01 DIAGNOSIS — N18.31 STAGE 3A CHRONIC KIDNEY DISEASE (HCC): ICD-10-CM

## 2023-05-01 DIAGNOSIS — M54.50 CHRONIC BILATERAL LOW BACK PAIN WITHOUT SCIATICA: Primary | ICD-10-CM

## 2023-05-01 DIAGNOSIS — N18.30 BENIGN HYPERTENSION WITH CKD (CHRONIC KIDNEY DISEASE) STAGE III (HCC): ICD-10-CM

## 2023-05-01 DIAGNOSIS — I12.9 BENIGN HYPERTENSION WITH CKD (CHRONIC KIDNEY DISEASE) STAGE III (HCC): ICD-10-CM

## 2023-05-01 PROBLEM — U07.1 COVID-19 VIRUS INFECTION: Status: RESOLVED | Noted: 2022-07-28 | Resolved: 2023-05-01

## 2023-05-01 RX ORDER — CYCLOBENZAPRINE HCL 10 MG
10 TABLET ORAL
Qty: 30 TABLET | Refills: 2 | Status: SHIPPED | OUTPATIENT
Start: 2023-05-01

## 2023-05-01 NOTE — PROGRESS NOTES
Name: Meena Murphy      : 1965      MRN: [id number]  Encounter Provider: Shyam Goel MD  Encounter Date: 2023   Encounter department: [facility] [address]     1  Chronic bilateral low back pain without sciatica  -     XR hips bilateral 3-4 vw w pelvis if performed; Future; Expected date: 2023  -     XR spine lumbar minimum 4 views non injury; Future; Expected date: 2023  -     Ambulatory referral to Physical Therapy; Future  -     MRI lumbar spine wo contrast; Future; Expected date: 2023  -     cyclobenzaprine (FLEXERIL) 10 mg tablet; Take 1 tablet (10 mg total) by mouth daily at bedtime as needed for muscle spasms (back pain)    2  Colon cancer screening  -     Ambulatory referral for colonoscopy; Future    3  Benign hypertension with CKD (chronic kidney disease) stage III [facility])  Assessment & Plan:  Lab Results   Component Value Date    EGFR 56 2023    EGFR 54 2022    EGFR 60 2022    CREATININE 1 37 (H) 2023    CREATININE 1 40 (H) 2022    CREATININE 1 31 (H) 2022   Blood pressure is well controlled  Continue current medication regimen      4  Stage 3a chronic kidney disease [facility])  Assessment & Plan:  Lab Results   Component Value Date    EGFR 56 2023    EGFR 54 2022    EGFR 60 2022    CREATININE 1 37 (H) 2023    CREATININE 1 40 (H) 2022    CREATININE 1 31 (H) 2022   Patient remains under care of [facility] nephrology                 Subjective     Patient presents for evaluation of low back pain that radiates to the hips  No radiation to the lower extremities  Patient has been using Tylenol for pain  He has been suffering with chronic low back pain over the past 2 years but symptoms have become progressively worse within past few months  No numbness, tingling or paresthesias  Occasionally he feels clumsy and has noticed occasional tripping but no falls    Left foot "is subjectively weaker than right  Patient stated that he sleeps on his left side with knees bent and pillow between the legs  Pain is significantly worse after prolonged sitting or laying down, pain also increases with walking  No bowel or bladder dysfunction  No pain in the anterior leg or groin  Patient has been attending chiropractic treatments with only partial improvement  Aside from low back pain he has been feeling generally well  He is past due colonoscopy and he is planning to proceed later this year  Hip Pain       Review of Systems   Constitutional: Negative  HENT: Negative  Eyes: Negative  Respiratory: Negative  Cardiovascular: Negative  Gastrointestinal: Negative  Endocrine: Negative  Genitourinary: Positive for frequency  Nocturia   Musculoskeletal: Positive for arthralgias and back pain  Allergic/Immunologic: Negative  Neurological: Negative  Hematological: Negative  Psychiatric/Behavioral: Negative          Past Medical History:   Diagnosis Date    Chronic kidney disease     \"monitoring\"    Colon polyp     Dental crowns present     veneers upper front 4    GERD (gastroesophageal reflux disease)     History of palpitations     \"not recently\"    History of pneumonia as a child     Hypertension     Motion sickness     Obesity     Shortness of breath     \"with exertion, out of shape\"    Umbilical hernia     Umbilical pain      Past Surgical History:   Procedure Laterality Date    COLONOSCOPY      LYMPH NODE DISSECTION      \"removed from throat as a child, told not cancer\"    CA RPR UMBILICAL HRNA 5 YRS/> REDUCIBLE N/A 4/22/2021    Procedure: REPAIR HERNIA UMBILICAL WITH MESH;  Surgeon: Silvana Holter, MD;  Location: [facility];  Service: General    TONSILLECTOMY      VASECTOMY      Surgery Vas Deferens Vasectomy     Family History   Problem Relation Age of Onset    Diabetes Father         Diabetes Mellitus    Hypertension Father     " "Breast cancer Sister      Social History     Socioeconomic History    Marital status: /Civil Union     Spouse name: None    Number of children: None    Years of education: None    Highest education level: None   Occupational History    None   Tobacco Use    Smoking status: Former     Types: Cigars    Smokeless tobacco: Never    Tobacco comments:     \"one year only during covid\"   Vaping Use    Vaping Use: Never used   Substance and Sexual Activity    Alcohol use: Yes     Comment: social    Drug use: No    Sexual activity: None   Other Topics Concern    None   Social History Narrative    None     Social Determinants of Health     Financial Resource Strain: Not on file   Food Insecurity: Not on file   Transportation Needs: Not on file   Physical Activity: Not on file   Stress: Not on file   Social Connections: Not on file   Intimate Partner Violence: Not on file   Housing Stability: Not on file     Current Outpatient Medications on File Prior to Visit   Medication Sig    amLODIPine (NORVASC) 10 mg tablet Take 1 tablet (10 mg total) by mouth in the morning   escitalopram (LEXAPRO) 20 mg tablet take 1 tablet by mouth once daily    pantoprazole (PROTONIX) 40 mg tablet take 1 tablet by mouth every morning 1/2 HOUR before breakfast     No Known Allergies  Immunization History   Administered Date(s) Administered    COVID-19 PFIZER VACCINE 0 3 ML IM 03/29/2021, 04/19/2021    Hep B, adult 07/09/2019    Influenza Quadrivalent Preservative Free 3 years and older IM 11/27/2015    Influenza, seasonal, injectable 11/29/2005       Objective     /82 (BP Location: Left arm, Patient Position: Sitting, Cuff Size: Large)   Pulse 67   Temp 97 5 °F (36 4 °C) (Temporal)   Resp 16   Ht 5' 8\" (1 727 m)   Wt 107 kg (236 lb)   SpO2 97%   BMI 35 88 kg/m²     Physical Exam  Vitals and nursing note reviewed  Constitutional:       General: He is not in acute distress  Appearance: Normal appearance   " He is not ill-appearing  Abdominal:      General: Bowel sounds are normal       Palpations: Abdomen is soft  Musculoskeletal:      Right lower leg: No edema  Left lower leg: No edema  Comments: Reproducible tenderness with palpation of L4-L5, L5-S1 and SI joints  No reproducible tenderness with palpation of greater trochanters  Negative straight leg raising on the right, positive straight leg raising on the left at 60 to 70 degrees  Lower extremity muscle strength: 4-5 bilaterally  Significant pain with walking on toes/unable  Patient is able to walk on his heels  Neurological:      General: No focal deficit present  Mental Status: He is alert and oriented to person, place, and time  Psychiatric:         Mood and Affect: Mood normal          Behavior: Behavior normal          Thought Content:  Thought content normal        Jerod Villanueva MD

## 2023-05-01 NOTE — ASSESSMENT & PLAN NOTE
Lab Results   Component Value Date    EGFR 56 04/19/2023    EGFR 54 11/29/2022    EGFR 60 06/28/2022    CREATININE 1 37 (H) 04/19/2023    CREATININE 1 40 (H) 11/29/2022    CREATININE 1 31 (H) 06/28/2022   Patient remains under care of [facility] nephrology

## 2023-05-01 NOTE — ASSESSMENT & PLAN NOTE
Lab Results   Component Value Date    EGFR 56 04/19/2023    EGFR 54 11/29/2022    EGFR 60 06/28/2022    CREATININE 1 37 (H) 04/19/2023    CREATININE 1 40 (H) 11/29/2022    CREATININE 1 31 (H) 06/28/2022   Blood pressure is well controlled  Continue current medication regimen

## 2023-05-05 ENCOUNTER — TELEPHONE (OUTPATIENT)
Dept: NEPHROLOGY | Facility: CLINIC | Age: 58
End: 2023-05-05

## 2023-05-05 NOTE — TELEPHONE ENCOUNTER
I called the patient and left a message on voicemail  Recent laboratory data were reviewed  Results for orders placed or performed in visit on 04/19/23   Creatinine, urine, 24 hour   Result Value Ref Range    Creatinine, 24H Ur 0 7 (L) 0 8 - 1 8 g/24Hr    TOTAL URINE VOLUME 550 ml   Sodium, urine, 24 hour   Result Value Ref Range    Sodium, 24H Ur 70 95 40 - 220 mmol/24 hrs    24H Urine Volume 550 mL    PERIOD 24 Hours   Aldosterone, urine, 24 hour   Result Value Ref Range    Aldosterone, 24H Ur 4 97 0 00 - 19 00 ug/24 hr    Aldosterone U,Random 9 04 Not Estab   ug/L     24 hour urine for Aldosterone is normal

## 2023-05-23 ENCOUNTER — TELEPHONE (OUTPATIENT)
Dept: NEPHROLOGY | Facility: CLINIC | Age: 58
End: 2023-05-23

## 2023-06-15 DIAGNOSIS — I12.9 BENIGN HYPERTENSIVE KIDNEY DISEASE WITH CHRONIC KIDNEY DISEASE STAGE I THROUGH STAGE IV, OR UNSPECIFIED: ICD-10-CM

## 2023-06-15 RX ORDER — AMLODIPINE BESYLATE 10 MG/1
TABLET ORAL
Qty: 90 TABLET | Refills: 3 | Status: SHIPPED | OUTPATIENT
Start: 2023-06-15

## 2023-12-23 DIAGNOSIS — K21.9 CHRONIC GERD: ICD-10-CM

## 2023-12-23 RX ORDER — PANTOPRAZOLE SODIUM 40 MG/1
TABLET, DELAYED RELEASE ORAL
Qty: 90 TABLET | Refills: 3 | Status: SHIPPED | OUTPATIENT
Start: 2023-12-23

## 2024-01-24 DIAGNOSIS — F41.9 ANXIETY: ICD-10-CM

## 2024-01-24 RX ORDER — ESCITALOPRAM OXALATE 20 MG/1
TABLET ORAL
Qty: 90 TABLET | Refills: 1 | Status: SHIPPED | OUTPATIENT
Start: 2024-01-24

## 2024-01-24 NOTE — TELEPHONE ENCOUNTER
I just refilled patient's medication with 1 RF .Patient is due for office visit, please set up.Thank you

## 2024-03-28 ENCOUNTER — OFFICE VISIT (OUTPATIENT)
Dept: FAMILY MEDICINE CLINIC | Facility: CLINIC | Age: 59
End: 2024-03-28
Payer: COMMERCIAL

## 2024-03-28 VITALS
SYSTOLIC BLOOD PRESSURE: 112 MMHG | OXYGEN SATURATION: 98 % | DIASTOLIC BLOOD PRESSURE: 72 MMHG | HEART RATE: 67 BPM | HEIGHT: 68 IN | RESPIRATION RATE: 16 BRPM | TEMPERATURE: 97.5 F | BODY MASS INDEX: 36.25 KG/M2 | WEIGHT: 239.2 LBS

## 2024-03-28 DIAGNOSIS — M54.50 CHRONIC BILATERAL LOW BACK PAIN WITHOUT SCIATICA: ICD-10-CM

## 2024-03-28 DIAGNOSIS — K63.5 POLYP OF COLON, UNSPECIFIED PART OF COLON, UNSPECIFIED TYPE: ICD-10-CM

## 2024-03-28 DIAGNOSIS — E66.09 CLASS 2 OBESITY DUE TO EXCESS CALORIES WITHOUT SERIOUS COMORBIDITY WITH BODY MASS INDEX (BMI) OF 36.0 TO 36.9 IN ADULT: ICD-10-CM

## 2024-03-28 DIAGNOSIS — Z12.5 PROSTATE CANCER SCREENING: ICD-10-CM

## 2024-03-28 DIAGNOSIS — Z00.00 ENCOUNTER FOR WELLNESS EXAMINATION IN ADULT: Primary | ICD-10-CM

## 2024-03-28 DIAGNOSIS — N18.30 BENIGN HYPERTENSION WITH CKD (CHRONIC KIDNEY DISEASE) STAGE III (HCC): ICD-10-CM

## 2024-03-28 DIAGNOSIS — E78.5 DYSLIPIDEMIA: ICD-10-CM

## 2024-03-28 DIAGNOSIS — G89.29 CHRONIC BILATERAL LOW BACK PAIN WITHOUT SCIATICA: ICD-10-CM

## 2024-03-28 DIAGNOSIS — Z12.11 SCREENING FOR COLON CANCER: ICD-10-CM

## 2024-03-28 DIAGNOSIS — K21.9 GASTROESOPHAGEAL REFLUX DISEASE WITHOUT ESOPHAGITIS: ICD-10-CM

## 2024-03-28 DIAGNOSIS — F41.1 GAD (GENERALIZED ANXIETY DISORDER): ICD-10-CM

## 2024-03-28 DIAGNOSIS — I12.9 BENIGN HYPERTENSION WITH CKD (CHRONIC KIDNEY DISEASE) STAGE III (HCC): ICD-10-CM

## 2024-03-28 DIAGNOSIS — R73.02 IGT (IMPAIRED GLUCOSE TOLERANCE): ICD-10-CM

## 2024-03-28 PROCEDURE — 99213 OFFICE O/P EST LOW 20 MIN: CPT | Performed by: FAMILY MEDICINE

## 2024-03-28 PROCEDURE — 99396 PREV VISIT EST AGE 40-64: CPT | Performed by: FAMILY MEDICINE

## 2024-03-28 NOTE — PROGRESS NOTES
Name: Amador Martinez      : 1965      MRN: [id number]  Encounter Provider: Ingrid Stevens MD  Encounter Date: 3/28/2024   Encounter department: [facility]    Assessment & Plan     1. Encounter for wellness examination in adult    2. Benign hypertension with CKD (chronic kidney disease) stage III (HCC)  Assessment & Plan:  Lab Results   Component Value Date    EGFR 56 2023    EGFR 54 2022    EGFR 60 2022    CREATININE 1.37 (H) 2023    CREATININE 1.40 (H) 2022    CREATININE 1.31 (H) 2022   Blood pressure is well-controlled on amlodipine 10 mg daily  Proceed with labs, follow-up with nephrology and PCP      3. Screening for colon cancer  -     Ambulatory referral to Gastroenterology; Future    4. Polyp of colon, unspecified part of colon, unspecified type  Assessment & Plan:  Sessile colon polyp rectosigmoid area- colonoscopy , past due follow up study    Orders:  -     Ambulatory referral to Gastroenterology; Future    5. Dyslipidemia  -     CBC and differential; Future  -     Comprehensive metabolic panel; Future  -     Lipid Panel with Direct LDL reflex; Future  -     TSH, 3rd generation; Future    6. Prostate cancer screening  -     PSA, Total Screen; Future    7. Class 2 obesity due to excess calories without serious comorbidity with body mass index (BMI) of 36.0 to 36.9 in adult  Assessment & Plan:  Discussed GOLO and weight loss      8. Gastroesophageal reflux disease without esophagitis  Assessment & Plan:  Intermittent s/o of GERD.  Patient should follow bland diet.  He remains on Protonix 40 mg daily  Patient will benefit from EGD along with colonoscopy.  Referral to [facility] GI    Orders:  -     Ambulatory referral to Gastroenterology; Future    9. IGT (impaired glucose tolerance)  -     Hemoglobin A1C; Future    10. MARLYN (generalized anxiety disorder)  Assessment & Plan:  Doing well on Lexapro      11. Chronic bilateral low back  "pain without sciatica  Assessment & Plan:  Continue chiropractic care and home PT.  Patient may use PRN Flexeril.  He should be avoiding anti-inflammatories due to CRI.             Subjective     Annual  well exam, follow-up chronic medical conditions  Has been under care of chiropractor for treatment of low back pain which overall has improved.  He schedules monthly visits for maintenance of treatment.  He continues with home exercises.  Denies symptoms of sciatica.      He remains on daily Rx for generalized anxiety disorder, GERD and hypertension.  He is due for blood work, no recent follow-up with nephrology.    No complaints of chest pain, palpitations, shortness of breath or dizziness.  Patient is past due colonoscopy.      Review of Systems   Constitutional: Negative.    HENT: Negative.     Eyes: Negative.    Respiratory: Negative.     Cardiovascular: Negative.    Gastrointestinal: Negative.    Endocrine: Negative.    Genitourinary: Negative.    Musculoskeletal:  Positive for arthralgias and back pain.        Back and knee   Skin: Negative.    Allergic/Immunologic: Negative.    Neurological: Negative.    Hematological: Negative.    Psychiatric/Behavioral: Negative.         Past Medical History:   Diagnosis Date   • Chronic kidney disease     \"monitoring\"   • Chronic kidney disease-mineral and bone disorder 07/02/2020   • Colon polyp    • Dental crowns present     veneers upper front 4   • GERD (gastroesophageal reflux disease)    • History of palpitations     \"not recently\"   • History of pneumonia as a child    • Hypertension    • Motion sickness    • Obesity    • Shortness of breath     \"with exertion, out of shape\"   • Umbilical hernia    • Umbilical pain      Past Surgical History:   Procedure Laterality Date   • COLONOSCOPY     • LYMPH NODE DISSECTION      \"removed from throat as a child, told not cancer\"   • IA RPR UMBILICAL HRNA 5 YRS/> REDUCIBLE N/A 4/22/2021    Procedure: REPAIR HERNIA UMBILICAL WITH " "MESH;  Surgeon: Amari Brenner MD;  Location: [facility];  Service: General   • TONSILLECTOMY     • VASECTOMY      Surgery Vas Deferens Vasectomy     Family History   Problem Relation Age of Onset   • Diabetes Father         Diabetes Mellitus   • Hypertension Father    • Breast cancer Sister      Social History     Socioeconomic History   • Marital status: /Civil Union     Spouse name: None   • Number of children: None   • Years of education: None   • Highest education level: None   Occupational History   • None   Tobacco Use   • Smoking status: Former     Types: Cigars   • Smokeless tobacco: Never   • Tobacco comments:     \"one year only during covid\"   Vaping Use   • Vaping status: Never Used   Substance and Sexual Activity   • Alcohol use: Yes     Comment: social   • Drug use: No   • Sexual activity: None   Other Topics Concern   • None   Social History Narrative   • None     Social Determinants of Health     Financial Resource Strain: Not on file   Food Insecurity: Not on file   Transportation Needs: Not on file   Physical Activity: Not on file   Stress: Not on file   Social Connections: Not on file   Intimate Partner Violence: Not on file   Housing Stability: Not on file     Current Outpatient Medications on File Prior to Visit   Medication Sig   • amLODIPine (NORVASC) 10 mg tablet take 1 tablet by mouth every morning   • cyclobenzaprine (FLEXERIL) 10 mg tablet Take 1 tablet (10 mg total) by mouth daily at bedtime as needed for muscle spasms (back pain)   • escitalopram (LEXAPRO) 20 mg tablet take 1 tablet by mouth once daily   • pantoprazole (PROTONIX) 40 mg tablet take 1 tablet by mouth every morning 1/2 HOUR before breakfast     No Known Allergies  Immunization History   Administered Date(s) Administered   • COVID-19 PFIZER VACCINE 0.3 ML IM 03/29/2021, 04/19/2021   • Hep B, adult 07/09/2019   • Influenza Quadrivalent Preservative Free 3 years and older IM 11/27/2015   • Influenza, seasonal, " "injectable 11/29/2005       Objective     /72   Pulse 67   Temp 97.5 °F (36.4 °C) (Temporal)   Resp 16   Ht 5' 8\" (1.727 m)   Wt 109 kg (239 lb 3.2 oz)   SpO2 98%   BMI 36.37 kg/m²     Physical Exam  Vitals and nursing note reviewed.   Constitutional:       General: He is not in acute distress.     Appearance: Normal appearance. He is well-developed. He is not ill-appearing.   HENT:      Head: Normocephalic and atraumatic.   Eyes:      Conjunctiva/sclera: Conjunctivae normal.   Neck:      Vascular: No carotid bruit.   Cardiovascular:      Rate and Rhythm: Normal rate and regular rhythm.      Heart sounds: Normal heart sounds. No murmur heard.  Pulmonary:      Effort: Pulmonary effort is normal. No respiratory distress.      Breath sounds: Normal breath sounds. No wheezing or rales.   Abdominal:      General: Bowel sounds are normal. There is no distension or abdominal bruit.      Palpations: Abdomen is soft.   Musculoskeletal:         General: Normal range of motion.      Cervical back: Neck supple.   Neurological:      General: No focal deficit present.      Mental Status: He is alert and oriented to person, place, and time.      Cranial Nerves: No cranial nerve deficit.   Psychiatric:         Mood and Affect: Mood normal.         Behavior: Behavior normal.       Ingrid Stevens MD    "

## 2024-03-28 NOTE — ASSESSMENT & PLAN NOTE
Intermittent s/o of GERD.  Patient should follow bland diet.  He remains on Protonix 40 mg daily  Patient will benefit from EGD along with colonoscopy.  Referral to [facility]

## 2024-03-31 PROBLEM — M54.50 CHRONIC LOW BACK PAIN WITHOUT SCIATICA: Status: ACTIVE | Noted: 2024-03-31

## 2024-03-31 PROBLEM — R07.89 CHEST PAIN, ATYPICAL: Status: RESOLVED | Noted: 2022-05-16 | Resolved: 2024-03-31

## 2024-03-31 PROBLEM — N18.9 CHRONIC KIDNEY DISEASE-MINERAL AND BONE DISORDER: Status: RESOLVED | Noted: 2020-07-02 | Resolved: 2024-03-31

## 2024-03-31 PROBLEM — M89.9 CHRONIC KIDNEY DISEASE-MINERAL AND BONE DISORDER: Status: RESOLVED | Noted: 2020-07-02 | Resolved: 2024-03-31

## 2024-03-31 PROBLEM — G89.29 CHRONIC LOW BACK PAIN WITHOUT SCIATICA: Status: ACTIVE | Noted: 2024-03-31

## 2024-03-31 PROBLEM — E83.9 CHRONIC KIDNEY DISEASE-MINERAL AND BONE DISORDER: Status: RESOLVED | Noted: 2020-07-02 | Resolved: 2024-03-31

## 2024-04-01 NOTE — ASSESSMENT & PLAN NOTE
Lab Results   Component Value Date    EGFR 56 04/19/2023    EGFR 54 11/29/2022    EGFR 60 06/28/2022    CREATININE 1.37 (H) 04/19/2023    CREATININE 1.40 (H) 11/29/2022    CREATININE 1.31 (H) 06/28/2022   Proceed with labs, due for f/up with renal

## 2024-04-01 NOTE — ASSESSMENT & PLAN NOTE
Lab Results   Component Value Date    EGFR 56 04/19/2023    EGFR 54 11/29/2022    EGFR 60 06/28/2022    CREATININE 1.37 (H) 04/19/2023    CREATININE 1.40 (H) 11/29/2022    CREATININE 1.31 (H) 06/28/2022   Blood pressure is well-controlled on amlodipine 10 mg daily  Proceed with labs, follow-up with nephrology and PCP

## 2024-04-01 NOTE — ASSESSMENT & PLAN NOTE
Continue chiropractic care and home PT.  Patient may use PRN Flexeril.  He should be avoiding anti-inflammatories due to CRI.

## 2024-04-03 ENCOUNTER — PREP FOR PROCEDURE (OUTPATIENT)
Age: 59
End: 2024-04-03

## 2024-04-03 ENCOUNTER — TELEPHONE (OUTPATIENT)
Age: 59
End: 2024-04-03

## 2024-04-03 DIAGNOSIS — Z86.010 HISTORY OF COLON POLYPS: Primary | ICD-10-CM

## 2024-04-03 NOTE — TELEPHONE ENCOUNTER
04/03/24  Screened by: Joyce Harris    Referring Provider     Pre- Screening:     There is no height or weight on file to calculate BMI.  36.37  Has patient been referred for a routine screening Colonoscopy? yes  Is the patient between 45-75 years old? yes      Previous Colonoscopy yes   If yes:    Date: 8/2016    Facility:     Reason:     Does the patient want to see a Gastroenterologist prior to their procedure OR are they having any GI symptoms? no    Has the patient been hospitalized or had abdominal surgery in the past 6 months? no    Does the patient use supplemental oxygen? no    Does the patient take Coumadin, Lovenox, Plavix, Elliquis, Xarelto, or other blood thinning medication? no    Has the patient had a stroke, cardiac event, or stent placed in the past year? no      If patient is between 45yrs - 49yrs, please advise patient that we will have to confirm benefits & coverage with their insurance company for a routine screening colonoscopy.

## 2024-04-03 NOTE — TELEPHONE ENCOUNTER
Scheduled date of colonoscopy (as of today): 6/12/24  Physician performing colonoscopy:   Location of colonoscopy: [facility]  Bowel prep reviewed with patient: Miralax Dulcolax prep instructions reviewed and sent via email  Instructions reviewed with patient by: md  Clearances:   ASC assessment

## 2024-05-29 ENCOUNTER — ANESTHESIA EVENT (OUTPATIENT)
Dept: ANESTHESIOLOGY | Facility: HOSPITAL | Age: 59
End: 2024-05-29

## 2024-05-29 ENCOUNTER — ANESTHESIA (OUTPATIENT)
Dept: ANESTHESIOLOGY | Facility: HOSPITAL | Age: 59
End: 2024-05-29

## 2024-06-11 RX ORDER — SODIUM CHLORIDE, SODIUM LACTATE, POTASSIUM CHLORIDE, CALCIUM CHLORIDE 600; 310; 30; 20 MG/100ML; MG/100ML; MG/100ML; MG/100ML
50 INJECTION, SOLUTION INTRAVENOUS CONTINUOUS
Status: CANCELLED | OUTPATIENT
Start: 2024-06-11

## 2024-06-12 ENCOUNTER — ANESTHESIA EVENT (OUTPATIENT)
Dept: GASTROENTEROLOGY | Facility: AMBULARY SURGERY CENTER | Age: 59
End: 2024-06-12

## 2024-06-12 ENCOUNTER — ANESTHESIA (OUTPATIENT)
Dept: GASTROENTEROLOGY | Facility: AMBULARY SURGERY CENTER | Age: 59
End: 2024-06-12

## 2024-06-12 ENCOUNTER — HOSPITAL ENCOUNTER (OUTPATIENT)
Dept: GASTROENTEROLOGY | Facility: AMBULARY SURGERY CENTER | Age: 59
Setting detail: OUTPATIENT SURGERY
Discharge: HOME | End: 2024-06-12
Attending: INTERNAL MEDICINE
Payer: COMMERCIAL

## 2024-06-12 VITALS
DIASTOLIC BLOOD PRESSURE: 86 MMHG | OXYGEN SATURATION: 96 % | HEART RATE: 62 BPM | BODY MASS INDEX: 34.86 KG/M2 | WEIGHT: 230 LBS | TEMPERATURE: 96.9 F | RESPIRATION RATE: 18 BRPM | SYSTOLIC BLOOD PRESSURE: 129 MMHG | HEIGHT: 68 IN

## 2024-06-12 DIAGNOSIS — Z86.010 HISTORY OF COLON POLYPS: ICD-10-CM

## 2024-06-12 PROCEDURE — 88305 TISSUE EXAM BY PATHOLOGIST: CPT | Performed by: PATHOLOGY

## 2024-06-12 PROCEDURE — 45385 COLONOSCOPY W/LESION REMOVAL: CPT | Performed by: INTERNAL MEDICINE

## 2024-06-12 RX ORDER — SODIUM CHLORIDE, SODIUM LACTATE, POTASSIUM CHLORIDE, CALCIUM CHLORIDE 600; 310; 30; 20 MG/100ML; MG/100ML; MG/100ML; MG/100ML
INJECTION, SOLUTION INTRAVENOUS CONTINUOUS PRN
Status: DISCONTINUED | OUTPATIENT
Start: 2024-06-12 | End: 2024-06-12

## 2024-06-12 RX ORDER — PROPOFOL 10 MG/ML
INJECTION, EMULSION INTRAVENOUS CONTINUOUS PRN
Status: DISCONTINUED | OUTPATIENT
Start: 2024-06-12 | End: 2024-06-12

## 2024-06-12 RX ORDER — PROPOFOL 10 MG/ML
INJECTION, EMULSION INTRAVENOUS AS NEEDED
Status: DISCONTINUED | OUTPATIENT
Start: 2024-06-12 | End: 2024-06-12

## 2024-06-12 RX ORDER — LIDOCAINE HYDROCHLORIDE 10 MG/ML
INJECTION, SOLUTION EPIDURAL; INFILTRATION; INTRACAUDAL; PERINEURAL AS NEEDED
Status: DISCONTINUED | OUTPATIENT
Start: 2024-06-12 | End: 2024-06-12

## 2024-06-12 RX ORDER — SODIUM CHLORIDE, SODIUM LACTATE, POTASSIUM CHLORIDE, CALCIUM CHLORIDE 600; 310; 30; 20 MG/100ML; MG/100ML; MG/100ML; MG/100ML
50 INJECTION, SOLUTION INTRAVENOUS CONTINUOUS
Status: DISCONTINUED | OUTPATIENT
Start: 2024-06-12 | End: 2024-06-16 | Stop reason: HOSPADM

## 2024-06-12 RX ADMIN — LIDOCAINE HYDROCHLORIDE 50 MG: 10 INJECTION, SOLUTION EPIDURAL; INFILTRATION; INTRACAUDAL; PERINEURAL at 08:27

## 2024-06-12 RX ADMIN — PROPOFOL 100 MG: 10 INJECTION, EMULSION INTRAVENOUS at 08:27

## 2024-06-12 RX ADMIN — SODIUM CHLORIDE, SODIUM LACTATE, POTASSIUM CHLORIDE, AND CALCIUM CHLORIDE: .6; .31; .03; .02 INJECTION, SOLUTION INTRAVENOUS at 08:17

## 2024-06-12 RX ADMIN — PROPOFOL 130 MCG/KG/MIN: 10 INJECTION, EMULSION INTRAVENOUS at 08:30

## 2024-06-12 NOTE — ANESTHESIA POSTPROCEDURE EVALUATION
Post-Op Assessment Note    CV Status:  Stable  Pain Score: 0    Pain management: adequate       Mental Status:  Sleepy   Hydration Status:  Euvolemic   PONV Controlled:  Controlled   Airway Patency:  Patent     Post Op Vitals Reviewed: Yes    No anethesia notable event occurred.    Staff: Anesthesiologist, CRNA               /79 (06/12/24 0837)    Temp      Pulse 64 (06/12/24 0837)   Resp 12 (06/12/24 0837)    SpO2 98 % (06/12/24 0837)

## 2024-06-12 NOTE — ANESTHESIA PREPROCEDURE EVALUATION
Procedure:  COLONOSCOPY    Relevant Problems   GI/HEPATIC   (+) Acid reflux disease      /RENAL   (+) Benign hypertension with CKD (chronic kidney disease) stage III (HCC)   (+) Kidney cysts      MUSCULOSKELETAL   (+) Chronic low back pain without sciatica      NEURO/PSYCH   (+) Chronic low back pain without sciatica   (+) MARLYN (generalized anxiety disorder)      PULMONARY   (+) Asthma      Other   (+) Class 2 obesity due to excess calories without serious comorbidity with body mass index (BMI) of 36.0 to 36.9 in adult   (+) Dyslipidemia        Physical Exam    Airway    Mallampati score: II  TM Distance: >3 FB  Neck ROM: full     Dental       Cardiovascular  Cardiovascular exam normal    Pulmonary  Pulmonary exam normal     Other Findings        Anesthesia Plan  ASA Score- 3     Anesthesia Type- IV sedation with anesthesia with ASA Monitors.         Additional Monitors:     Airway Plan:            Plan Factors-Exercise tolerance (METS): >4 METS.    Chart reviewed.   Existing labs reviewed. Patient summary reviewed.    Patient is a current smoker.  Patient instructed to abstain from smoking on day of procedure. Patient did not smoke on day of surgery.            Induction-     Postoperative Plan-     Perioperative Resuscitation Plan - Level 1 - Full Code.       Informed Consent- Anesthetic plan and risks discussed with patient.  I personally reviewed this patient with the CRNA. Discussed and agreed on the Anesthesia Plan with the CRNA..        Lab Results   Component Value Date    HGBA1C 6.2 (H) 11/29/2022       Lab Results   Component Value Date     01/08/2016    K 3.6 04/19/2023     04/19/2023    CO2 29 04/19/2023    ANIONGAP 9 07/21/2014    BUN 16 04/19/2023    CREATININE 1.37 (H) 04/19/2023    GLUCOSE 102 07/21/2014    GLUF 121 (H) 04/19/2023    CALCIUM 9.5 04/19/2023    CORRECTEDCA 8.9 03/08/2021    AST 16 06/28/2022    ALT 35 06/28/2022    ALKPHOS 70 06/28/2022    PROT 6.8 01/08/2016    BILITOT  0.6 01/08/2016    EGFR 56 04/19/2023       Lab Results   Component Value Date    WBC 5.49 06/28/2022    HGB 15.7 06/28/2022    HCT 46.2 06/28/2022    MCV 87 06/28/2022     06/28/2022     Normal sinus rhythm, 64 beats per minute, no acute ischemic changes, nonspecific T-wave changes in aVL.  No changes since previous study in December 2021.      Specimen Collected: 05/13/22 11:33

## 2024-06-12 NOTE — H&P
"History and Physical - [facility]  Amador Martinez 58 y.o. male MRN: [id number]        HPI: 58-year-old male with history of colon polyps.  Regular bowel movements.    Historical Information   Past Medical History:   Diagnosis Date    Chronic kidney disease     \"monitoring\"    Chronic kidney disease-mineral and bone disorder 07/02/2020    Colon polyp     Dental crowns present     veneers upper front 4    GERD (gastroesophageal reflux disease)     History of palpitations     \"not recently\"    History of pneumonia as a child     Hypertension     Motion sickness     Obesity     Shortness of breath     \"with exertion, out of shape\"    Umbilical hernia     Umbilical pain      Past Surgical History:   Procedure Laterality Date    COLONOSCOPY      LYMPH NODE DISSECTION      \"removed from throat as a child, told not cancer\"    ME RPR UMBILICAL HRNA 5 YRS/> REDUCIBLE N/A 4/22/2021    Procedure: REPAIR HERNIA UMBILICAL WITH MESH;  Surgeon: Amari Brenner MD;  Location: [facility];  Service: General    TONSILLECTOMY      VASECTOMY      Surgery Vas Deferens Vasectomy     Social History   Social History     Substance and Sexual Activity   Alcohol Use Yes    Comment: social     Social History     Substance and Sexual Activity   Drug Use No     Social History     Tobacco Use   Smoking Status Former    Types: Cigars   Smokeless Tobacco Never   Tobacco Comments    \"one year only during covid\"     Family History   Problem Relation Age of Onset    Diabetes Father         Diabetes Mellitus    Hypertension Father     Breast cancer Sister        Meds/Allergies     Not in a hospital admission.    No Known Allergies    Objective     Blood pressure 131/84, pulse 65, temperature (!) 96.7 °F (35.9 °C), temperature source Temporal, resp. rate 18, height 5' 8\" (1.727 m), weight 104 kg (230 lb), SpO2 98%.    Physical Exam:    Chest- CTA  Heart- RRR  Abdomen- NT/ND  Extremities- No edema    ASSESSMENT:     History of " colon polyps    PLAN:    Colonoscopy

## 2024-06-17 PROCEDURE — 88305 TISSUE EXAM BY PATHOLOGIST: CPT | Performed by: PATHOLOGY

## 2024-07-10 DIAGNOSIS — I12.9 BENIGN HYPERTENSIVE KIDNEY DISEASE WITH CHRONIC KIDNEY DISEASE STAGE I THROUGH STAGE IV, OR UNSPECIFIED: ICD-10-CM

## 2024-07-10 RX ORDER — AMLODIPINE BESYLATE 10 MG/1
TABLET ORAL
Qty: 90 TABLET | Refills: 3 | Status: SHIPPED | OUTPATIENT
Start: 2024-07-10

## 2024-08-15 DIAGNOSIS — F41.9 ANXIETY: ICD-10-CM

## 2024-08-16 RX ORDER — ESCITALOPRAM OXALATE 20 MG/1
TABLET ORAL
Qty: 90 TABLET | Refills: 1 | Status: SHIPPED | OUTPATIENT
Start: 2024-08-16

## 2024-09-03 ENCOUNTER — APPOINTMENT (OUTPATIENT)
Dept: LAB | Facility: CLINIC | Age: 59
End: 2024-09-03
Payer: COMMERCIAL

## 2024-09-03 DIAGNOSIS — E78.5 DYSLIPIDEMIA: ICD-10-CM

## 2024-09-03 DIAGNOSIS — R73.02 IGT (IMPAIRED GLUCOSE TOLERANCE): ICD-10-CM

## 2024-09-03 DIAGNOSIS — Z12.5 PROSTATE CANCER SCREENING: ICD-10-CM

## 2024-09-03 LAB
ALBUMIN SERPL BCG-MCNC: 4.2 G/DL (ref 3.5–5)
ALP SERPL-CCNC: 54 U/L (ref 34–104)
ALT SERPL W P-5'-P-CCNC: 21 U/L (ref 7–52)
ANION GAP SERPL CALCULATED.3IONS-SCNC: 10 MMOL/L (ref 4–13)
AST SERPL W P-5'-P-CCNC: 16 U/L (ref 13–39)
BASOPHILS # BLD AUTO: 0.08 THOUSANDS/ÂΜL (ref 0–0.1)
BASOPHILS NFR BLD AUTO: 2 % (ref 0–1)
BILIRUB SERPL-MCNC: 0.52 MG/DL (ref 0.2–1)
BUN SERPL-MCNC: 13 MG/DL (ref 5–25)
CALCIUM SERPL-MCNC: 8.9 MG/DL (ref 8.4–10.2)
CHLORIDE SERPL-SCNC: 101 MMOL/L (ref 96–108)
CHOLEST SERPL-MCNC: 159 MG/DL
CO2 SERPL-SCNC: 29 MMOL/L (ref 21–32)
CREAT SERPL-MCNC: 1.12 MG/DL (ref 0.6–1.3)
EOSINOPHIL # BLD AUTO: 0.19 THOUSAND/ÂΜL (ref 0–0.61)
EOSINOPHIL NFR BLD AUTO: 4 % (ref 0–6)
ERYTHROCYTE [DISTWIDTH] IN BLOOD BY AUTOMATED COUNT: 12.4 % (ref 11.6–15.1)
EST. AVERAGE GLUCOSE BLD GHB EST-MCNC: 180 MG/DL
GFR SERPL CREATININE-BSD FRML MDRD: 71 ML/MIN/1.73SQ M
GLUCOSE P FAST SERPL-MCNC: 178 MG/DL (ref 65–99)
HBA1C MFR BLD: 7.9 %
HCT VFR BLD AUTO: 47.6 % (ref 36.5–49.3)
HDLC SERPL-MCNC: 33 MG/DL
HGB BLD-MCNC: 16.2 G/DL (ref 12–17)
IMM GRANULOCYTES # BLD AUTO: 0.02 THOUSAND/UL (ref 0–0.2)
IMM GRANULOCYTES NFR BLD AUTO: 0 % (ref 0–2)
LDLC SERPL CALC-MCNC: 89 MG/DL (ref 0–100)
LYMPHOCYTES # BLD AUTO: 1.49 THOUSANDS/ÂΜL (ref 0.6–4.47)
LYMPHOCYTES NFR BLD AUTO: 28 % (ref 14–44)
MCH RBC QN AUTO: 29.1 PG (ref 26.8–34.3)
MCHC RBC AUTO-ENTMCNC: 34 G/DL (ref 31.4–37.4)
MCV RBC AUTO: 86 FL (ref 82–98)
MONOCYTES # BLD AUTO: 0.5 THOUSAND/ÂΜL (ref 0.17–1.22)
MONOCYTES NFR BLD AUTO: 9 % (ref 4–12)
NEUTROPHILS # BLD AUTO: 3.04 THOUSANDS/ÂΜL (ref 1.85–7.62)
NEUTS SEG NFR BLD AUTO: 57 % (ref 43–75)
NRBC BLD AUTO-RTO: 0 /100 WBCS
PLATELET # BLD AUTO: 244 THOUSANDS/UL (ref 149–390)
PMV BLD AUTO: 9.5 FL (ref 8.9–12.7)
POTASSIUM SERPL-SCNC: 3.9 MMOL/L (ref 3.5–5.3)
PROT SERPL-MCNC: 6.8 G/DL (ref 6.4–8.4)
PSA SERPL-MCNC: 0.66 NG/ML (ref 0–4)
RBC # BLD AUTO: 5.57 MILLION/UL (ref 3.88–5.62)
SODIUM SERPL-SCNC: 140 MMOL/L (ref 135–147)
TRIGL SERPL-MCNC: 187 MG/DL
TSH SERPL DL<=0.05 MIU/L-ACNC: 0.74 UIU/ML (ref 0.45–4.5)
WBC # BLD AUTO: 5.32 THOUSAND/UL (ref 4.31–10.16)

## 2024-09-03 PROCEDURE — 83036 HEMOGLOBIN GLYCOSYLATED A1C: CPT

## 2024-09-03 PROCEDURE — 80053 COMPREHEN METABOLIC PANEL: CPT

## 2024-09-03 PROCEDURE — 36415 COLL VENOUS BLD VENIPUNCTURE: CPT

## 2024-09-03 PROCEDURE — 85025 COMPLETE CBC W/AUTO DIFF WBC: CPT

## 2024-09-03 PROCEDURE — G0103 PSA SCREENING: HCPCS

## 2024-09-03 PROCEDURE — 80061 LIPID PANEL: CPT

## 2024-09-03 PROCEDURE — 84443 ASSAY THYROID STIM HORMONE: CPT

## 2024-09-04 ENCOUNTER — TELEPHONE (OUTPATIENT)
Dept: FAMILY MEDICINE CLINIC | Facility: CLINIC | Age: 59
End: 2024-09-04

## 2024-09-04 NOTE — TELEPHONE ENCOUNTER
Please contact the patient.  I received results of blood work.  It indicates new onset of diabetes.  Hemoglobin A1c 7.9%, normal should be below 6.5.    Patient should follow a low carbohydrate diet and try to lose weight.    Please schedule follow-up within next few weeks.  If needed-okay to use same-day appointment.  Thank you

## 2024-09-04 NOTE — TELEPHONE ENCOUNTER
Patient returned call - I have relayed the doctors message to him verbatim and offered to schedule the follow up however, he was driving and stated he will call back when he gets home.

## 2024-09-27 ENCOUNTER — OFFICE VISIT (OUTPATIENT)
Dept: FAMILY MEDICINE CLINIC | Facility: CLINIC | Age: 59
End: 2024-09-27
Payer: COMMERCIAL

## 2024-09-27 VITALS
WEIGHT: 231.4 LBS | RESPIRATION RATE: 18 BRPM | OXYGEN SATURATION: 98 % | DIASTOLIC BLOOD PRESSURE: 70 MMHG | SYSTOLIC BLOOD PRESSURE: 120 MMHG | BODY MASS INDEX: 35.18 KG/M2 | HEART RATE: 87 BPM

## 2024-09-27 DIAGNOSIS — N18.30 BENIGN HYPERTENSION WITH CKD (CHRONIC KIDNEY DISEASE) STAGE III (HCC): ICD-10-CM

## 2024-09-27 DIAGNOSIS — K21.9 GASTROESOPHAGEAL REFLUX DISEASE WITHOUT ESOPHAGITIS: ICD-10-CM

## 2024-09-27 DIAGNOSIS — I12.9 BENIGN HYPERTENSION WITH CKD (CHRONIC KIDNEY DISEASE) STAGE III (HCC): ICD-10-CM

## 2024-09-27 DIAGNOSIS — E11.9 TYPE 2 DIABETES MELLITUS WITHOUT COMPLICATION, WITHOUT LONG-TERM CURRENT USE OF INSULIN (HCC): Primary | ICD-10-CM

## 2024-09-27 DIAGNOSIS — F41.1 GAD (GENERALIZED ANXIETY DISORDER): ICD-10-CM

## 2024-09-27 PROBLEM — E87.6 HYPOKALEMIA: Status: RESOLVED | Noted: 2020-12-16 | Resolved: 2024-09-27

## 2024-09-27 PROCEDURE — 99214 OFFICE O/P EST MOD 30 MIN: CPT | Performed by: FAMILY MEDICINE

## 2024-09-27 RX ORDER — METFORMIN HCL 500 MG
TABLET, EXTENDED RELEASE 24 HR ORAL
Qty: 270 TABLET | Refills: 1 | Status: SHIPPED | OUTPATIENT
Start: 2024-09-27

## 2024-09-27 NOTE — ASSESSMENT & PLAN NOTE
Lab Results   Component Value Date    HGBA1C 7.9 (H) 09/03/2024   New onset type 2 diabetes  History of IGT  Family history of type 2 diabetes and many family members  Patient started making changes in his diet and lifestyle  He is motivated to continue with low carbohydrate diet and exercise as tolerated  Start metformin, repeat labs in 3 months, proceed with diabetic eye exam  Patient is not interested in Accu-Cheks at home.      Orders:    metFORMIN (GLUCOPHAGE-XR) 500 mg 24 hr tablet; Take 1 tab daily for 5 days, then 2 tabs daily for 5 days,then take 3 tabs with dinner    Comprehensive metabolic panel; Future    Hemoglobin A1C; Future    Albumin / creatinine urine ratio; Future

## 2024-09-27 NOTE — PROGRESS NOTES
Ambulatory Visit  Name: Amador Martinez      : 1965      MRN: [id number]  Encounter Provider: Ingrid Stevens MD  Encounter Date: 2024   Encounter department: [facility]    Assessment & Plan  Type 2 diabetes mellitus without complication, without long-term current use of insulin ([facility])    Lab Results   Component Value Date    HGBA1C 7.9 (H) 2024   New onset type 2 diabetes  History of IGT  Family history of type 2 diabetes and many family members  Patient started making changes in his diet and lifestyle  He is motivated to continue with low carbohydrate diet and exercise as tolerated  Start metformin, repeat labs in 3 months, proceed with diabetic eye exam  Patient is not interested in Accu-Cheks at home.      Orders:    metFORMIN (GLUCOPHAGE-XR) 500 mg 24 hr tablet; Take 1 tab daily for 5 days, then 2 tabs daily for 5 days,then take 3 tabs with dinner    Comprehensive metabolic panel; Future    Hemoglobin A1C; Future    Albumin / creatinine urine ratio; Future    Benign hypertension with CKD (chronic kidney disease) stage III ([facility])  Lab Results   Component Value Date    EGFR 71 2024    EGFR 56 2023    EGFR 54 2022    CREATININE 1.12 2024    CREATININE 1.37 (H) 2023    CREATININE 1.40 (H) 2022   Normal creatinine and GFR now  Continue with healthy lifestyle changes  Blood pressure is well-controlled, continue amlodipine         Gastroesophageal reflux disease without esophagitis  Continue PPI therapy         MARLYN (generalized anxiety disorder)  Continue Lexapro            Return in about 3 months (around 2024).    History of Present Illness     The patient presents for follow-up  Results of recent blood work results reviewed.   All labs are normal aside from hemoglobin A1c of 7.9% consistent with type 2 diabetes, new onset.   Patient with history of IGT and strong family history of type 2 diabetes in many family members on father  "side including his dad.   Chronic low back pain.  He follows with chiropractor, feels that symptoms are fairly well-controlled.   He had regular eye exam at [facility].   Patient has been making lifestyle changes and started following low carbohydrate diet with an effort to lose weight within past few weeks after results of blood work.   He remains on daily Rx for hypertension, GERD and acid reflux disease.      Review of Systems   Constitutional: Negative.    HENT: Negative.     Eyes: Negative.    Respiratory: Negative.     Cardiovascular: Negative.    Gastrointestinal: Negative.    Endocrine: Negative.    Genitourinary: Negative.    Musculoskeletal:  Positive for back pain. Negative for arthralgias.   Allergic/Immunologic: Negative.    Neurological: Negative.    Hematological: Negative.    Psychiatric/Behavioral: Negative.       Past Medical History:   Diagnosis Date    Chronic kidney disease     \"monitoring\"    Chronic kidney disease-mineral and bone disorder 07/02/2020    Colon polyp     Dental crowns present     veneers upper front 4    GERD (gastroesophageal reflux disease)     History of palpitations     \"not recently\"    History of pneumonia as a child     Hypertension     Motion sickness     Obesity     Shortness of breath     \"with exertion, out of shape\"    Umbilical hernia     Umbilical pain      Past Surgical History:   Procedure Laterality Date    COLONOSCOPY      LYMPH NODE DISSECTION      \"removed from throat as a child, told not cancer\"    AR RPR UMBILICAL HRNA 5 YRS/> REDUCIBLE N/A 4/22/2021    Procedure: REPAIR HERNIA UMBILICAL WITH MESH;  Surgeon: Amari Brenner MD;  Location: [facility];  Service: General    TONSILLECTOMY      VASECTOMY      Surgery Vas Deferens Vasectomy     Family History   Problem Relation Age of Onset    Diabetes Father         Diabetes Mellitus    Hypertension Father     Breast cancer Sister      Social History     Tobacco Use    Smoking status: Former     Types: Cigars " "   Smokeless tobacco: Never    Tobacco comments:     \"one year only during covid\"   Vaping Use    Vaping status: Never Used   Substance and Sexual Activity    Alcohol use: Yes     Comment: social    Drug use: No    Sexual activity: Not on file     Current Outpatient Medications on File Prior to Visit   Medication Sig    amLODIPine (NORVASC) 10 mg tablet take 1 tablet by mouth every morning    escitalopram (LEXAPRO) 20 mg tablet take 1 tablet by mouth once daily    pantoprazole (PROTONIX) 40 mg tablet take 1 tablet by mouth every morning 1/2 HOUR before breakfast    cyclobenzaprine (FLEXERIL) 10 mg tablet Take 1 tablet (10 mg total) by mouth daily at bedtime as needed for muscle spasms (back pain) (Patient not taking: Reported on 9/27/2024)     No Known Allergies  Immunization History   Administered Date(s) Administered    COVID-19 PFIZER VACCINE 0.3 ML IM 03/29/2021, 04/19/2021    Hep B, adult 07/09/2019    Influenza Quadrivalent Preservative Free 3 years and older IM 11/27/2015    Influenza, seasonal, injectable 11/29/2005     Objective     /80   Pulse 87   Resp 18   Wt 105 kg (231 lb 6.4 oz)   SpO2 98%   BMI 35.18 kg/m²     Physical Exam  Vitals and nursing note reviewed.   Constitutional:       General: He is not in acute distress.     Appearance: Normal appearance. He is not ill-appearing.   HENT:      Head: Normocephalic and atraumatic.   Eyes:      General: No scleral icterus.     Conjunctiva/sclera: Conjunctivae normal.   Neck:      Vascular: No carotid bruit.   Cardiovascular:      Rate and Rhythm: Normal rate and regular rhythm.      Pulses: no weak pulses.           Dorsalis pedis pulses are 2+ on the right side and 2+ on the left side.      Heart sounds: Normal heart sounds. No murmur heard.  Pulmonary:      Effort: Pulmonary effort is normal. No respiratory distress.      Breath sounds: Normal breath sounds.   Abdominal:      General: Bowel sounds are normal. There is no distension.      " Palpations: Abdomen is soft.   Musculoskeletal:         General: Normal range of motion.      Cervical back: Neck supple. No rigidity.      Right lower leg: No edema.      Left lower leg: No edema.   Feet:      Right foot:      Skin integrity: No ulcer, skin breakdown, erythema, warmth, callus or dry skin.      Left foot:      Skin integrity: No ulcer, skin breakdown, erythema, warmth, callus or dry skin.   Skin:     General: Skin is warm.      Findings: No rash.   Neurological:      General: No focal deficit present.      Mental Status: He is alert and oriented to person, place, and time.   Psychiatric:         Mood and Affect: Mood normal.         Behavior: Behavior normal.         Thought Content: Thought content normal.     Diabetic Foot Exam    Patient's shoes and socks removed.    Right Foot/Ankle   Right Foot Inspection  Skin Exam: skin normal and skin intact. No dry skin, no warmth, no callus, no erythema, no maceration, no abnormal color, no pre-ulcer, no ulcer and no callus.     Toe Exam: ROM and strength within normal limits.     Sensory   Vibration: intact  Proprioception: intact  Monofilament testing: diminished    Vascular  The right DP pulse is 2+.     Left Foot/Ankle  Left Foot Inspection  Skin Exam: skin normal and skin intact. No dry skin, no warmth, no erythema, no maceration, normal color, no pre-ulcer, no ulcer and no callus.     Toe Exam: ROM and strength within normal limits.     Sensory   Vibration: intact  Proprioception: intact  Monofilament testing: diminished    Vascular  The left DP pulse is 2+.     Assign Risk Category  No deformity present  Loss of protective sensation  No weak pulses  Risk: 1

## 2024-09-27 NOTE — ASSESSMENT & PLAN NOTE
Lab Results   Component Value Date    EGFR 71 09/03/2024    EGFR 56 04/19/2023    EGFR 54 11/29/2022    CREATININE 1.12 09/03/2024    CREATININE 1.37 (H) 04/19/2023    CREATININE 1.40 (H) 11/29/2022   Normal creatinine and GFR now  Continue with healthy lifestyle changes  Blood pressure is well-controlled, continue amlodipine

## 2025-01-02 DIAGNOSIS — K21.9 CHRONIC GERD: ICD-10-CM

## 2025-01-03 RX ORDER — PANTOPRAZOLE SODIUM 40 MG/1
40 TABLET, DELAYED RELEASE ORAL
Qty: 90 TABLET | Refills: 1 | Status: SHIPPED | OUTPATIENT
Start: 2025-01-03

## 2025-01-16 LAB
LEFT EYE DIABETIC RETINOPATHY: NORMAL
RIGHT EYE DIABETIC RETINOPATHY: NORMAL

## 2025-02-19 ENCOUNTER — OFFICE VISIT (OUTPATIENT)
Dept: FAMILY MEDICINE CLINIC | Facility: CLINIC | Age: 60
End: 2025-02-19
Payer: COMMERCIAL

## 2025-02-19 VITALS
WEIGHT: 229.8 LBS | BODY MASS INDEX: 34.83 KG/M2 | DIASTOLIC BLOOD PRESSURE: 80 MMHG | HEART RATE: 93 BPM | SYSTOLIC BLOOD PRESSURE: 156 MMHG | OXYGEN SATURATION: 92 % | TEMPERATURE: 98.2 F | HEIGHT: 68 IN | RESPIRATION RATE: 20 BRPM

## 2025-02-19 DIAGNOSIS — I12.9 BENIGN HYPERTENSION WITH CKD (CHRONIC KIDNEY DISEASE) STAGE III (HCC): ICD-10-CM

## 2025-02-19 DIAGNOSIS — E11.9 TYPE 2 DIABETES MELLITUS WITHOUT COMPLICATION, UNSPECIFIED WHETHER LONG TERM INSULIN USE (HCC): ICD-10-CM

## 2025-02-19 DIAGNOSIS — J06.9 UPPER RESPIRATORY TRACT INFECTION, UNSPECIFIED TYPE: Primary | ICD-10-CM

## 2025-02-19 DIAGNOSIS — N18.30 BENIGN HYPERTENSION WITH CKD (CHRONIC KIDNEY DISEASE) STAGE III (HCC): ICD-10-CM

## 2025-02-19 DIAGNOSIS — J45.909 UNCOMPLICATED ASTHMA, UNSPECIFIED ASTHMA SEVERITY, UNSPECIFIED WHETHER PERSISTENT: ICD-10-CM

## 2025-02-19 LAB
SARS-COV-2 AG UPPER RESP QL IA: NEGATIVE
SL AMB POCT HEMOGLOBIN AIC: 7.4 (ref ?–6.5)
SL AMB POCT RAPID FLU A: POSITIVE
SL AMB POCT RAPID FLU B: NEGATIVE
VALID CONTROL: NORMAL

## 2025-02-19 PROCEDURE — 87804 INFLUENZA ASSAY W/OPTIC: CPT | Performed by: FAMILY MEDICINE

## 2025-02-19 PROCEDURE — 87811 SARS-COV-2 COVID19 W/OPTIC: CPT | Performed by: FAMILY MEDICINE

## 2025-02-19 PROCEDURE — 99214 OFFICE O/P EST MOD 30 MIN: CPT | Performed by: FAMILY MEDICINE

## 2025-02-19 PROCEDURE — 83036 HEMOGLOBIN GLYCOSYLATED A1C: CPT | Performed by: FAMILY MEDICINE

## 2025-02-19 RX ORDER — BENZONATATE 200 MG/1
200 CAPSULE ORAL 3 TIMES DAILY PRN
Qty: 20 CAPSULE | Refills: 0 | Status: SHIPPED | OUTPATIENT
Start: 2025-02-19

## 2025-02-19 RX ORDER — OSELTAMIVIR PHOSPHATE 75 MG/1
75 CAPSULE ORAL EVERY 12 HOURS SCHEDULED
Qty: 10 CAPSULE | Refills: 0 | Status: SHIPPED | OUTPATIENT
Start: 2025-02-19 | End: 2025-02-24

## 2025-02-19 NOTE — ASSESSMENT & PLAN NOTE
A1C is slightly improved from previous. Recommend healthy diet and regualr exercise. If not improved would consider increasing Metformin to 2000 mg daily at next visit in 3 months.  Lab Results   Component Value Date    HGBA1C 7.4 (A) 02/19/2025     Orders:  •  POCT hemoglobin A1c

## 2025-02-19 NOTE — PROGRESS NOTES
Name: Amador Martinez      : 1965      MRN: [id number]  Encounter Provider: Nani Drummond DO  Encounter Date: 2025   Encounter department: [facility]  :  Assessment & Plan  Upper respiratory tract infection, unspecified type  Flu A positive, COVID negative. Symptom onset was 2 days ago. Start Tamiflu and Tessalon. Symptom directed treatment and supportive care otherwise. Follow up as needed.  Orders:  •  POCT Rapid Covid Ag  •  POCT rapid flu A and B  •  oseltamivir (TAMIFLU) 75 mg capsule; Take 1 capsule (75 mg total) by mouth every 12 (twelve) hours for 5 days  •  benzonatate (TESSALON) 200 MG capsule; Take 1 capsule (200 mg total) by mouth 3 (three) times a day as needed for cough    Uncomplicated asthma, unspecified asthma severity, unspecified whether persistent  No acute asthma issues in the setting of Flu. If any issues will follow up.       Type 2 diabetes mellitus without complication, unspecified whether long term insulin use (HCC)  A1C is slightly improved from previous. Recommend healthy diet and regualr exercise. If not improved would consider increasing Metformin to 2000 mg daily at next visit in 3 months.  Lab Results   Component Value Date    HGBA1C 7.4 (A) 2025     Orders:  •  POCT hemoglobin A1c    Benign hypertension with CKD (chronic kidney disease) stage III (HCC)  Lab Results   Component Value Date    EGFR 71 2024    EGFR 56 2023    EGFR 54 2022    CREATININE 1.12 2024    CREATININE 1.37 (H) 2023    CREATININE 1.40 (H) 2022     BP elevated today in setting of acute illness. Avoid decongestants. Follow up in 2 weeks if persistently elevated on home monitoring.              History of Present Illness   HPI  Symptom onset 2 days ago with cough, congestion, fever, chest tightness, chills, myalgias, headache, sore throat. No GI symptoms.   Review of Systems    Objective   /80 (BP Location: Left arm, Patient  "Position: Sitting, Cuff Size: Standard)   Pulse 93   Temp 98.2 °F (36.8 °C) (Temporal)   Resp 20   Ht 5' 8\" (1.727 m)   Wt 104 kg (229 lb 12.8 oz)   SpO2 92%   BMI 34.94 kg/m²      Physical Exam  Vitals reviewed.   Constitutional:       Appearance: Normal appearance. He is ill-appearing.   HENT:      Right Ear: External ear normal.      Left Ear: External ear normal.      Nose: Congestion present.      Mouth/Throat:      Mouth: Mucous membranes are moist.      Pharynx: Posterior oropharyngeal erythema present.   Eyes:      Extraocular Movements: Extraocular movements intact.      Conjunctiva/sclera: Conjunctivae normal.   Cardiovascular:      Rate and Rhythm: Normal rate and regular rhythm.      Heart sounds: Normal heart sounds.   Pulmonary:      Effort: Pulmonary effort is normal.   Skin:     General: Skin is warm and dry.   Neurological:      Mental Status: He is alert.   Psychiatric:         Mood and Affect: Mood normal.         Behavior: Behavior normal.         "

## 2025-02-20 NOTE — ASSESSMENT & PLAN NOTE
Lab Results   Component Value Date    EGFR 71 09/03/2024    EGFR 56 04/19/2023    EGFR 54 11/29/2022    CREATININE 1.12 09/03/2024    CREATININE 1.37 (H) 04/19/2023    CREATININE 1.40 (H) 11/29/2022     BP elevated today in setting of acute illness. Avoid decongestants. Follow up in 2 weeks if persistently elevated on home monitoring.

## 2025-02-24 ENCOUNTER — OFFICE VISIT (OUTPATIENT)
Dept: FAMILY MEDICINE CLINIC | Facility: CLINIC | Age: 60
End: 2025-02-24
Payer: COMMERCIAL

## 2025-02-24 VITALS
WEIGHT: 227 LBS | BODY MASS INDEX: 34.4 KG/M2 | TEMPERATURE: 97.6 F | SYSTOLIC BLOOD PRESSURE: 130 MMHG | HEIGHT: 68 IN | RESPIRATION RATE: 20 BRPM | HEART RATE: 62 BPM | DIASTOLIC BLOOD PRESSURE: 80 MMHG | OXYGEN SATURATION: 96 %

## 2025-02-24 DIAGNOSIS — J45.20 MILD INTERMITTENT ASTHMA WITHOUT COMPLICATION: Primary | ICD-10-CM

## 2025-02-24 DIAGNOSIS — J11.1 INFLUENZA: ICD-10-CM

## 2025-02-24 PROCEDURE — 99213 OFFICE O/P EST LOW 20 MIN: CPT | Performed by: NURSE PRACTITIONER

## 2025-02-24 RX ORDER — ALBUTEROL SULFATE 90 UG/1
2 INHALANT RESPIRATORY (INHALATION) EVERY 4 HOURS PRN
Qty: 18 G | Refills: 0 | Status: SHIPPED | OUTPATIENT
Start: 2025-02-24

## 2025-02-24 RX ORDER — PREDNISONE 10 MG/1
TABLET ORAL
Qty: 20 TABLET | Refills: 0 | Status: SHIPPED | OUTPATIENT
Start: 2025-02-24

## 2025-02-24 NOTE — ASSESSMENT & PLAN NOTE
Recovering from influenza, suspect exacerbation of asthma.   Congestion, sore throat, fevers resolved.   Now with cough, chest tightness.   Start prednisone.   Albuterol inhaler as needed.   Call me if not improving over the next few days.     Orders:  •  predniSONE 10 mg tablet; Take 4 tablets for 2 days, 3 tablets for 2 days, 2 tablets for 2 days, 1 tablet for 2 days.  •  albuterol (ProAir HFA) 90 mcg/act inhaler; Inhale 2 puffs every 4 (four) hours as needed for shortness of breath (cough)

## 2025-02-24 NOTE — PROGRESS NOTES
"Name: Amador Martinez      : 1965      MRN: [id number]  Encounter Provider: ELOISA Arreola  Encounter Date: 2025   Encounter department: [facility]  :  Assessment & Plan  Mild intermittent asthma without complication  Recovering from influenza, suspect exacerbation of asthma.   Congestion, sore throat, fevers resolved.   Now with cough, chest tightness.   Start prednisone.   Albuterol inhaler as needed.   Call me if not improving over the next few days.     Orders:  •  predniSONE 10 mg tablet; Take 4 tablets for 2 days, 3 tablets for 2 days, 2 tablets for 2 days, 1 tablet for 2 days.  •  albuterol (ProAir HFA) 90 mcg/act inhaler; Inhale 2 puffs every 4 (four) hours as needed for shortness of breath (cough)    Influenza  Recovering from influenza, finishing Tamiflu today.                 History of Present Illness   Amador Martinez is a 59 year old male presenting today for cough.     Had Flu last week.   He is taking Tamiflu, Mucinex, benzonatate.    Has history of asthma.   No recent use of albuterol, has not had an inhaler for years.     Traveling starting on Wednesday To [facility], then [place].     Chest feels tight. Can't tell if he is wheezing.     Deep breathing starts cough.             Review of Systems   Constitutional:  Positive for fatigue. Negative for chills, diaphoresis and fever.   HENT:  Negative for congestion, postnasal drip and sore throat.    Respiratory:  Positive for cough and chest tightness. Negative for shortness of breath and wheezing.    Cardiovascular: Negative.    Gastrointestinal: Negative.    Musculoskeletal:  Negative for myalgias.   Neurological:  Negative for headaches.       Objective   /80   Pulse 62   Temp 97.6 °F (36.4 °C) (Temporal)   Resp 20   Ht 5' 8\" (1.727 m)   Wt 103 kg (227 lb)   SpO2 96%   BMI 34.52 kg/m²      Physical Exam  Vitals and nursing note reviewed.   Constitutional:       General: He is not in acute " distress.     Appearance: Normal appearance. He is not ill-appearing.   HENT:      Head: Atraumatic.      Right Ear: Tympanic membrane normal.      Left Ear: Tympanic membrane normal.      Nose: Nose normal.      Mouth/Throat:      Mouth: Mucous membranes are moist.      Pharynx: Oropharynx is clear.   Cardiovascular:      Rate and Rhythm: Normal rate and regular rhythm.      Heart sounds: No murmur heard.  Pulmonary:      Effort: Pulmonary effort is normal.      Breath sounds: Normal breath sounds.      Comments: Frequent harsh cough, dry.  Musculoskeletal:      Cervical back: Normal range of motion and neck supple.      Right lower leg: No edema.      Left lower leg: No edema.   Lymphadenopathy:      Cervical: No cervical adenopathy.   Neurological:      Mental Status: He is alert.   Psychiatric:         Mood and Affect: Mood normal.           Current Outpatient Medications:   •  albuterol (ProAir HFA) 90 mcg/act inhaler, Inhale 2 puffs every 4 (four) hours as needed for shortness of breath (cough), Disp: 18 g, Rfl: 0  •  amLODIPine (NORVASC) 10 mg tablet, take 1 tablet by mouth every morning, Disp: 90 tablet, Rfl: 3  •  benzonatate (TESSALON) 200 MG capsule, Take 1 capsule (200 mg total) by mouth 3 (three) times a day as needed for cough, Disp: 20 capsule, Rfl: 0  •  escitalopram (LEXAPRO) 20 mg tablet, take 1 tablet by mouth once daily, Disp: 90 tablet, Rfl: 1  •  metFORMIN (GLUCOPHAGE-XR) 500 mg 24 hr tablet, Take 1 tab daily for 5 days, then 2 tabs daily for 5 days,then take 3 tabs with dinner, Disp: 270 tablet, Rfl: 1  •  oseltamivir (TAMIFLU) 75 mg capsule, Take 1 capsule (75 mg total) by mouth every 12 (twelve) hours for 5 days, Disp: 10 capsule, Rfl: 0  •  pantoprazole (PROTONIX) 40 mg tablet, take 1 tablet by mouth every morning 1/2 HOUR before breakfast, Disp: 90 tablet, Rfl: 1  •  predniSONE 10 mg tablet, Take 4 tablets for 2 days, 3 tablets for 2 days, 2 tablets for 2 days, 1 tablet for 2 days., Disp:  20 tablet, Rfl: 0  •  cyclobenzaprine (FLEXERIL) 10 mg tablet, Take 1 tablet (10 mg total) by mouth daily at bedtime as needed for muscle spasms (back pain) (Patient not taking: Reported on 2/19/2025), Disp: 30 tablet, Rfl: 2

## 2025-03-04 DIAGNOSIS — F41.9 ANXIETY: ICD-10-CM

## 2025-03-05 RX ORDER — ESCITALOPRAM OXALATE 20 MG/1
20 TABLET ORAL DAILY
Qty: 90 TABLET | Refills: 1 | Status: SHIPPED | OUTPATIENT
Start: 2025-03-05

## 2025-04-12 DIAGNOSIS — E11.9 TYPE 2 DIABETES MELLITUS WITHOUT COMPLICATION, WITHOUT LONG-TERM CURRENT USE OF INSULIN (HCC): ICD-10-CM

## 2025-04-13 RX ORDER — METFORMIN HYDROCHLORIDE 500 MG/1
TABLET, EXTENDED RELEASE ORAL
Qty: 270 TABLET | Refills: 1 | Status: SHIPPED | OUTPATIENT
Start: 2025-04-13

## 2025-04-29 ENCOUNTER — OFFICE VISIT (OUTPATIENT)
Dept: FAMILY MEDICINE CLINIC | Facility: CLINIC | Age: 60
End: 2025-04-29
Payer: COMMERCIAL

## 2025-04-29 ENCOUNTER — TELEPHONE (OUTPATIENT)
Dept: FAMILY MEDICINE CLINIC | Facility: CLINIC | Age: 60
End: 2025-04-29

## 2025-04-29 VITALS
SYSTOLIC BLOOD PRESSURE: 126 MMHG | RESPIRATION RATE: 16 BRPM | HEART RATE: 69 BPM | TEMPERATURE: 97.8 F | OXYGEN SATURATION: 98 % | HEIGHT: 68 IN | WEIGHT: 229.4 LBS | DIASTOLIC BLOOD PRESSURE: 70 MMHG | BODY MASS INDEX: 34.77 KG/M2

## 2025-04-29 DIAGNOSIS — Z00.00 ENCOUNTER FOR WELLNESS EXAMINATION IN ADULT: Primary | ICD-10-CM

## 2025-04-29 DIAGNOSIS — E11.9 TYPE 2 DIABETES MELLITUS WITHOUT COMPLICATION, WITHOUT LONG-TERM CURRENT USE OF INSULIN (HCC): ICD-10-CM

## 2025-04-29 DIAGNOSIS — N18.30 BENIGN HYPERTENSION WITH CKD (CHRONIC KIDNEY DISEASE) STAGE III (HCC): ICD-10-CM

## 2025-04-29 DIAGNOSIS — K21.9 GASTROESOPHAGEAL REFLUX DISEASE WITHOUT ESOPHAGITIS: ICD-10-CM

## 2025-04-29 DIAGNOSIS — F41.1 GAD (GENERALIZED ANXIETY DISORDER): ICD-10-CM

## 2025-04-29 DIAGNOSIS — I12.9 BENIGN HYPERTENSION WITH CKD (CHRONIC KIDNEY DISEASE) STAGE III (HCC): ICD-10-CM

## 2025-04-29 PROCEDURE — 99396 PREV VISIT EST AGE 40-64: CPT | Performed by: FAMILY MEDICINE

## 2025-04-29 PROCEDURE — 99213 OFFICE O/P EST LOW 20 MIN: CPT | Performed by: FAMILY MEDICINE

## 2025-04-29 RX ORDER — TIRZEPATIDE 2.5 MG/.5ML
2.5 INJECTION, SOLUTION SUBCUTANEOUS WEEKLY
Qty: 2 ML | Refills: 1 | Status: SHIPPED | OUTPATIENT
Start: 2025-04-29 | End: 2025-04-29

## 2025-04-29 RX ORDER — TIRZEPATIDE 2.5 MG/.5ML
2.5 INJECTION, SOLUTION SUBCUTANEOUS WEEKLY
Qty: 2 ML | Refills: 1 | Status: SHIPPED | OUTPATIENT
Start: 2025-04-29

## 2025-04-29 NOTE — ASSESSMENT & PLAN NOTE
Lab Results   Component Value Date    HGBA1C 7.5 (H) 04/30/2025   Hemoglobin A1c 7.4% as of February  Patient stopped metformin due to GI side effects  BMI 34.8  We discussed Rx options  Start Mounjaro 2.5 mg weekly  Proceed with complete blood work and urine microalbumin now  Advised annual diabetic eye exam    Orders:  •  CBC; Future  •  Comprehensive metabolic panel; Future  •  Hemoglobin A1C; Future  •  Lipid Panel with Direct LDL reflex; Future  •  TSH, 3rd generation; Future  •  Albumin / creatinine urine ratio; Future

## 2025-04-29 NOTE — PROGRESS NOTES
Adult Annual Physical  Name: Amador Martinez      : 1965      MRN: [id number]  Encounter Provider: Ingrid Stevens MD  Encounter Date: 2025   Encounter department: [facility]    :  Assessment & Plan  Encounter for wellness examination in adult         Type 2 diabetes mellitus without complication, without long-term current use of insulin (HCC)    Lab Results   Component Value Date    HGBA1C 7.5 (H) 2025   Hemoglobin A1c 7.4% as of February  Patient stopped metformin due to GI side effects  BMI 34.8  We discussed Rx options  Start Mounjaro 2.5 mg weekly  Proceed with complete blood work and urine microalbumin now  Advised annual diabetic eye exam    Orders:  •  CBC; Future  •  Comprehensive metabolic panel; Future  •  Hemoglobin A1C; Future  •  Lipid Panel with Direct LDL reflex; Future  •  TSH, 3rd generation; Future  •  Albumin / creatinine urine ratio; Future    Benign hypertension with CKD (chronic kidney disease) stage III ([facility])  Lab Results   Component Value Date    EGFR 79 2025    EGFR 71 2024    EGFR 56 2023    CREATININE 1.02 2025    CREATININE 1.12 2024    CREATININE 1.37 (H) 2023   BP is well-controlled on amlodipine 10 mg daily  Monitor labs       Gastroesophageal reflux disease without esophagitis  Symptoms are well-controlled on pantoprazole 40 mg daily.       MARLYN (generalized anxiety disorder)  Patient is doing well on Lexapro 20 mg daily           Preventive Screenings:  - Diabetes Screening: screening not indicated and has diabetes  - Cholesterol Screening: risks/benefits discussed and orders placed   - Colon cancer screening: screening up-to-date   - Lung cancer screening: screening not indicated   - Prostate cancer screening: screening up-to-date     Immunizations:  - Immunizations due: Zoster (Shingrix)  - Risks/benefits immunizations discussed      Counseling/Anticipatory Guidance:    - Diet: discussed  "recommendations for a healthy/well-balanced diet.   - Exercise: the importance of regular exercise/physical activity was discussed. Recommend exercise 3-5 times per week for at least 30 minutes.        Return in about 6 months (around 10/29/2025).    History of Present Illness     Adult Annual Physical:  Patient presents for annual physical. Annual well exam.  Follow-up chronic medical conditions.  Patient discontinued metformin due to bothersome GI side effects.  He is due for routine blood work.  He remains in daily Rx for hypertension, generalized anxiety disorder and acid reflux disease.  Most recent hemoglobin A1c 7.4% back in February.    .     Diet and Physical Activity:  - Diet/Nutrition: well balanced diet.  - Exercise: walking.    Depression Screening:  - PHQ-2 Score: 0    General Health:  - Sleep: sleeps well.  - Dental: regular dental visits.    Review of Systems   Constitutional: Negative.    HENT: Negative.     Eyes: Negative.    Respiratory: Negative.     Cardiovascular: Negative.    Gastrointestinal: Negative.    Endocrine: Negative.    Genitourinary: Negative.    Musculoskeletal: Negative.    Allergic/Immunologic: Negative.    Neurological: Negative.    Psychiatric/Behavioral: Negative.       Medical History Reviewed by provider this encounter:  Tobacco  Allergies  Meds  Problems  Med Hx  Surg Hx  Fam Hx     .    Objective   /70 (BP Location: Left arm, Patient Position: Sitting, Cuff Size: Large)   Pulse 69   Temp 97.8 °F (36.6 °C) (Temporal)   Resp 16   Ht 5' 8\" (1.727 m)   Wt 104 kg (229 lb 6.4 oz)   SpO2 98%   BMI 34.88 kg/m²     Physical Exam  Vitals and nursing note reviewed.   Constitutional:       General: He is not in acute distress.     Appearance: Normal appearance. He is not ill-appearing.   HENT:      Head: Normocephalic and atraumatic.   Eyes:      General: No scleral icterus.     Conjunctiva/sclera: Conjunctivae normal.   Neck:      Vascular: No carotid bruit. "   Cardiovascular:      Rate and Rhythm: Normal rate and regular rhythm.      Heart sounds: Normal heart sounds. No murmur heard.  Pulmonary:      Effort: Pulmonary effort is normal. No respiratory distress.      Breath sounds: Normal breath sounds.   Abdominal:      General: Bowel sounds are normal. There is no distension.      Palpations: Abdomen is soft.      Tenderness: There is no abdominal tenderness.      Hernia: No hernia is present.   Musculoskeletal:         General: Normal range of motion.      Cervical back: Neck supple. No rigidity.      Right lower leg: No edema.      Left lower leg: No edema.   Skin:     General: Skin is warm.      Findings: No rash.   Neurological:      General: No focal deficit present.      Mental Status: He is alert and oriented to person, place, and time.   Psychiatric:         Mood and Affect: Mood normal.         Behavior: Behavior normal.         Thought Content: Thought content normal.

## 2025-04-29 NOTE — TELEPHONE ENCOUNTER
Patient would like to know if you can please send the [facility] pharmacy as [facility] wont have it for months. Please advise

## 2025-04-30 ENCOUNTER — APPOINTMENT (OUTPATIENT)
Dept: LAB | Facility: CLINIC | Age: 60
End: 2025-04-30
Payer: COMMERCIAL

## 2025-04-30 DIAGNOSIS — E11.9 TYPE 2 DIABETES MELLITUS WITHOUT COMPLICATION, WITHOUT LONG-TERM CURRENT USE OF INSULIN (HCC): ICD-10-CM

## 2025-04-30 LAB
ALBUMIN SERPL BCG-MCNC: 4.1 G/DL (ref 3.5–5)
ALP SERPL-CCNC: 51 U/L (ref 34–104)
ALT SERPL W P-5'-P-CCNC: 20 U/L (ref 7–52)
ANION GAP SERPL CALCULATED.3IONS-SCNC: 6 MMOL/L (ref 4–13)
AST SERPL W P-5'-P-CCNC: 14 U/L (ref 13–39)
BILIRUB SERPL-MCNC: 0.49 MG/DL (ref 0.2–1)
BUN SERPL-MCNC: 11 MG/DL (ref 5–25)
CALCIUM SERPL-MCNC: 8.9 MG/DL (ref 8.4–10.2)
CHLORIDE SERPL-SCNC: 103 MMOL/L (ref 96–108)
CHOLEST SERPL-MCNC: 148 MG/DL (ref ?–200)
CO2 SERPL-SCNC: 32 MMOL/L (ref 21–32)
CREAT SERPL-MCNC: 1.02 MG/DL (ref 0.6–1.3)
ERYTHROCYTE [DISTWIDTH] IN BLOOD BY AUTOMATED COUNT: 13.1 % (ref 11.6–15.1)
EST. AVERAGE GLUCOSE BLD GHB EST-MCNC: 169 MG/DL
GFR SERPL CREATININE-BSD FRML MDRD: 79 ML/MIN/1.73SQ M
GLUCOSE P FAST SERPL-MCNC: 160 MG/DL (ref 65–99)
HBA1C MFR BLD: 7.5 %
HCT VFR BLD AUTO: 43 % (ref 36.5–49.3)
HDLC SERPL-MCNC: 35 MG/DL
HGB BLD-MCNC: 14.9 G/DL (ref 12–17)
LDLC SERPL CALC-MCNC: 84 MG/DL (ref 0–100)
MCH RBC QN AUTO: 29.2 PG (ref 26.8–34.3)
MCHC RBC AUTO-ENTMCNC: 34.7 G/DL (ref 31.4–37.4)
MCV RBC AUTO: 84 FL (ref 82–98)
PLATELET # BLD AUTO: 198 THOUSANDS/UL (ref 149–390)
PMV BLD AUTO: 10 FL (ref 8.9–12.7)
POTASSIUM SERPL-SCNC: 3.9 MMOL/L (ref 3.5–5.3)
PROT SERPL-MCNC: 6.4 G/DL (ref 6.4–8.4)
RBC # BLD AUTO: 5.1 MILLION/UL (ref 3.88–5.62)
SODIUM SERPL-SCNC: 141 MMOL/L (ref 135–147)
TRIGL SERPL-MCNC: 147 MG/DL (ref ?–150)
TSH SERPL DL<=0.05 MIU/L-ACNC: 0.6 UIU/ML (ref 0.45–4.5)
WBC # BLD AUTO: 4.73 THOUSAND/UL (ref 4.31–10.16)

## 2025-04-30 PROCEDURE — 84443 ASSAY THYROID STIM HORMONE: CPT

## 2025-04-30 PROCEDURE — 36415 COLL VENOUS BLD VENIPUNCTURE: CPT

## 2025-04-30 PROCEDURE — 80053 COMPREHEN METABOLIC PANEL: CPT

## 2025-04-30 PROCEDURE — 83036 HEMOGLOBIN GLYCOSYLATED A1C: CPT

## 2025-04-30 PROCEDURE — 80061 LIPID PANEL: CPT

## 2025-04-30 PROCEDURE — 85027 COMPLETE CBC AUTOMATED: CPT

## 2025-05-03 NOTE — ASSESSMENT & PLAN NOTE
Lab Results   Component Value Date    EGFR 79 04/30/2025    EGFR 71 09/03/2024    EGFR 56 04/19/2023    CREATININE 1.02 04/30/2025    CREATININE 1.12 09/03/2024    CREATININE 1.37 (H) 04/19/2023   BP is well-controlled on amlodipine 10 mg daily  Monitor labs

## 2025-05-05 ENCOUNTER — TELEPHONE (OUTPATIENT)
Dept: FAMILY MEDICINE CLINIC | Facility: CLINIC | Age: 60
End: 2025-05-05

## 2025-05-05 DIAGNOSIS — E78.5 DYSLIPIDEMIA: ICD-10-CM

## 2025-05-05 DIAGNOSIS — E11.9 TYPE 2 DIABETES MELLITUS WITHOUT COMPLICATION, WITHOUT LONG-TERM CURRENT USE OF INSULIN (HCC): Primary | ICD-10-CM

## 2025-05-05 DIAGNOSIS — I12.9 BENIGN HYPERTENSION WITH CKD (CHRONIC KIDNEY DISEASE) STAGE III (HCC): ICD-10-CM

## 2025-05-05 DIAGNOSIS — N18.30 BENIGN HYPERTENSION WITH CKD (CHRONIC KIDNEY DISEASE) STAGE III (HCC): ICD-10-CM

## 2025-05-05 NOTE — TELEPHONE ENCOUNTER
Please contact the patient.  I reviewed results of recent blood work.  All labs were normal aside from elevated test for diabetes, hemoglobin A1c at 7.5%.  Patient should start Mounjaro as we have discussed.  Please remind him to keep me up-to-date with his blood glucose readings and weight every 3 to 4 weeks.    Patient should be scheduled for 6 months follow-up.  I will place the orders for the blood work to be done prior to his follow-up.    Thank you

## 2025-05-05 NOTE — TELEPHONE ENCOUNTER
Spoke to patient gave all results and information, patient will call back to schedule appointment for 6 months

## 2025-05-20 ENCOUNTER — TELEPHONE (OUTPATIENT)
Age: 60
End: 2025-05-20

## 2025-05-20 DIAGNOSIS — E11.9 TYPE 2 DIABETES MELLITUS WITHOUT COMPLICATION, WITHOUT LONG-TERM CURRENT USE OF INSULIN (HCC): Primary | ICD-10-CM

## 2025-05-20 RX ORDER — TIRZEPATIDE 5 MG/.5ML
5 INJECTION, SOLUTION SUBCUTANEOUS WEEKLY
Qty: 2 ML | Refills: 1 | Status: SHIPPED | OUTPATIENT
Start: 2025-05-20

## 2025-05-20 NOTE — TELEPHONE ENCOUNTER
Please let the patient know that I sent prescription for Mounjaro 5 mg once a week to the pharmacy.  Thank you

## 2025-05-20 NOTE — TELEPHONE ENCOUNTER
Patient called to say all is going well with the Mounjaro and Dr Tamayo can move him to the next level/dosage.

## 2025-05-28 ENCOUNTER — NURSE TRIAGE (OUTPATIENT)
Age: 60
End: 2025-05-28

## 2025-05-28 DIAGNOSIS — E11.9 TYPE 2 DIABETES MELLITUS WITHOUT COMPLICATION, WITHOUT LONG-TERM CURRENT USE OF INSULIN (HCC): ICD-10-CM

## 2025-05-28 RX ORDER — TIRZEPATIDE 5 MG/.5ML
5 INJECTION, SOLUTION SUBCUTANEOUS WEEKLY
Qty: 2 ML | Refills: 1 | Status: SHIPPED | OUTPATIENT
Start: 2025-05-28

## 2025-05-28 NOTE — TELEPHONE ENCOUNTER
"Reason for Disposition  • Prescription prescribed recently is not at pharmacy and triager has access to patient's EMR and prescription is recorded in the EMR    Answer Assessment - Initial Assessment Questions  1. NAME of MEDICINE: \"What medicine(s) are you calling about?\"      Mounjaro  2. QUESTION: \"What is your question?\" (e.g., double dose of medicine, side effect)      Change pharmacy  3. PRESCRIBER: \"Who prescribed the medicine?\" Reason: if prescribed by specialist, call should be referred to that group.      Dr. Stevens    Protocols used: Medication Question Call-Adult-[place]    "

## 2025-05-28 NOTE — TELEPHONE ENCOUNTER
Regarding: Pharmacy Change  ----- Message from Yue HERNANDEZ sent at 5/28/2025  9:15 AM EDT -----  Amador called stating that the prescription for his mounjaro would need to be sent to the [facility] pharmacy in [place], due to [facility] closing.    Please send   Tirzepatide (Mounjaro) 5 MG/0.5ML SOAJ     To   [address] [address] [phone]

## 2025-06-24 ENCOUNTER — OFFICE VISIT (OUTPATIENT)
Dept: FAMILY MEDICINE CLINIC | Facility: CLINIC | Age: 60
End: 2025-06-24
Payer: COMMERCIAL

## 2025-06-24 VITALS
WEIGHT: 230 LBS | BODY MASS INDEX: 34.86 KG/M2 | SYSTOLIC BLOOD PRESSURE: 120 MMHG | DIASTOLIC BLOOD PRESSURE: 78 MMHG | RESPIRATION RATE: 18 BRPM | HEIGHT: 68 IN | TEMPERATURE: 98 F | HEART RATE: 74 BPM | OXYGEN SATURATION: 99 %

## 2025-06-24 DIAGNOSIS — E11.9 TYPE 2 DIABETES MELLITUS WITHOUT COMPLICATION, WITHOUT LONG-TERM CURRENT USE OF INSULIN (HCC): Primary | ICD-10-CM

## 2025-06-24 DIAGNOSIS — M10.9 ACUTE GOUT INVOLVING TOE OF RIGHT FOOT, UNSPECIFIED CAUSE: ICD-10-CM

## 2025-06-24 LAB
CREAT UR-MCNC: 360.9 MG/DL
MICROALBUMIN UR-MCNC: 27.7 MG/L
MICROALBUMIN/CREAT 24H UR: 8 MG/G CREATININE (ref 0–30)

## 2025-06-24 PROCEDURE — 99213 OFFICE O/P EST LOW 20 MIN: CPT | Performed by: NURSE PRACTITIONER

## 2025-06-24 PROCEDURE — 82570 ASSAY OF URINE CREATININE: CPT | Performed by: NURSE PRACTITIONER

## 2025-06-24 PROCEDURE — 82043 UR ALBUMIN QUANTITATIVE: CPT | Performed by: NURSE PRACTITIONER

## 2025-06-24 RX ORDER — PREDNISONE 10 MG/1
TABLET ORAL
Qty: 20 TABLET | Refills: 0 | Status: SHIPPED | OUTPATIENT
Start: 2025-06-24

## 2025-06-24 NOTE — ASSESSMENT & PLAN NOTE
Lab Results   Component Value Date    HGBA1C 7.5 (H) 04/30/2025       Orders:  •  Albumin / creatinine urine ratio

## 2025-06-24 NOTE — PROGRESS NOTES
"Name: Amador Martinez      : 1965      MRN: [id number]  Encounter Provider: ELOISA Arreola  Encounter Date: 2025   Encounter department: [facility]  :  Assessment & Plan  Acute gout involving toe of right foot, unspecified cause  Symptoms consistent with gout flare.   Reviewed what this is, what are possible causes, and treatment.   Start prednisone taper, take with food.   In the future if this should recur, call right away, and start taking NSAID such as ibuprofen or aleve.   Reviewed dietary factors that can increase gout flares.   2 handouts provided regarding gout education and diet instructions for gout, including education printed on AVS.   He will call if symptoms do not improve with prednisone. He will call for any recurrence of symptoms.     Orders:  •  predniSONE 10 mg tablet; Take 4 tablets for 2 days, 3 tablets for 2 days, 2 tablets for 2 days, 1 tablet for 2 days.    Type 2 diabetes mellitus without complication, without long-term current use of insulin ([facility])    Lab Results   Component Value Date    HGBA1C 7.5 (H) 2025       Orders:  •  Albumin / creatinine urine ratio           History of Present Illness   Amador Martinez is a 59 year old male presenting today for right foot pain.     Started 2-3 days ago.   Right first MTP joint, red, swollen, painful.     Hurts all the time, constant.   Tried icing, acetaminophen.   Not helping.     Has never had gout before.   Recent more seafood intake--Friday and yesterday.   Does not eat seafood regularly.   No recent alcohol intake.   Does not eat much red meat.     No fevers, otherwise feels well.                   Review of Systems   Constitutional: Negative.    Musculoskeletal:  Positive for arthralgias (right great toe).       Objective   /78 (Patient Position: Sitting, Cuff Size: Large)   Pulse 74   Temp 98 °F (36.7 °C) (Temporal)   Resp 18   Ht 5' 8\" (1.727 m)   Wt 104 kg (230 lb)   SpO2 99%   " BMI 34.97 kg/m²      Physical Exam  Vitals and nursing note reviewed.   Constitutional:       General: He is not in acute distress.     Appearance: Normal appearance. He is not ill-appearing.     Cardiovascular:      Rate and Rhythm: Normal rate and regular rhythm.      Pulses:           Dorsalis pedis pulses are 2+ on the right side and 2+ on the left side.      Heart sounds: No murmur heard.  Pulmonary:      Effort: Pulmonary effort is normal.      Breath sounds: Normal breath sounds.     Musculoskeletal:      Comments: Right first MTP joint with redness, swelling, tenderness, warmth.     Neurological:      Mental Status: He is alert.     Psychiatric:         Mood and Affect: Mood normal.         Current Medications[1]          [1]    Current Outpatient Medications:   •  amLODIPine (NORVASC) 10 mg tablet, take 1 tablet by mouth every morning, Disp: 90 tablet, Rfl: 3  •  escitalopram (LEXAPRO) 20 mg tablet, take 1 tablet by mouth once daily, Disp: 90 tablet, Rfl: 1  •  pantoprazole (PROTONIX) 40 mg tablet, take 1 tablet by mouth every morning 1/2 HOUR before breakfast, Disp: 90 tablet, Rfl: 1  •  predniSONE 10 mg tablet, Take 4 tablets for 2 days, 3 tablets for 2 days, 2 tablets for 2 days, 1 tablet for 2 days., Disp: 20 tablet, Rfl: 0  •  Tirzepatide (Mounjaro) 7.5 MG/0.5ML SOAJ, Inject 7.5 mg under the skin once a week, Disp: 2 mL, Rfl: 1  •  cyclobenzaprine (FLEXERIL) 10 mg tablet, Take 1 tablet (10 mg total) by mouth daily at bedtime as needed for muscle spasms (back pain) (Patient not taking: Reported on 2/19/2025), Disp: 30 tablet, Rfl: 2

## 2025-07-12 ENCOUNTER — HOSPITAL ENCOUNTER (OUTPATIENT)
Dept: MRI IMAGING | Facility: HOSPITAL | Age: 60
Discharge: HOME | End: 2025-07-12
Attending: FAMILY MEDICINE
Payer: COMMERCIAL

## 2025-07-12 DIAGNOSIS — G89.29 CHRONIC BILATERAL LOW BACK PAIN WITHOUT SCIATICA: ICD-10-CM

## 2025-07-12 DIAGNOSIS — I12.9 BENIGN HYPERTENSIVE KIDNEY DISEASE WITH CHRONIC KIDNEY DISEASE STAGE I THROUGH STAGE IV, OR UNSPECIFIED: ICD-10-CM

## 2025-07-12 DIAGNOSIS — M54.50 CHRONIC BILATERAL LOW BACK PAIN WITHOUT SCIATICA: ICD-10-CM

## 2025-07-12 DIAGNOSIS — K21.9 CHRONIC GERD: ICD-10-CM

## 2025-07-12 PROCEDURE — 72148 MRI LUMBAR SPINE W/O DYE: CPT

## 2025-07-12 PROCEDURE — G1004 CDSM NDSC: HCPCS

## 2025-07-14 RX ORDER — PANTOPRAZOLE SODIUM 40 MG/1
40 TABLET, DELAYED RELEASE ORAL
Qty: 90 TABLET | Refills: 1 | Status: SHIPPED | OUTPATIENT
Start: 2025-07-14 | End: 2025-08-01 | Stop reason: SDUPTHER

## 2025-07-14 RX ORDER — AMLODIPINE BESYLATE 10 MG/1
10 TABLET ORAL EVERY MORNING
Qty: 90 TABLET | Refills: 3 | Status: SHIPPED | OUTPATIENT
Start: 2025-07-14 | End: 2025-08-01 | Stop reason: SDUPTHER

## 2025-07-22 ENCOUNTER — TELEPHONE (OUTPATIENT)
Age: 60
End: 2025-07-22

## 2025-07-22 DIAGNOSIS — M43.06 LUMBAR SPONDYLOLYSIS: ICD-10-CM

## 2025-07-22 DIAGNOSIS — G89.29 CHRONIC BILATERAL LOW BACK PAIN WITHOUT SCIATICA: Primary | ICD-10-CM

## 2025-07-22 DIAGNOSIS — M54.50 CHRONIC BILATERAL LOW BACK PAIN WITHOUT SCIATICA: Primary | ICD-10-CM

## 2025-07-22 DIAGNOSIS — E11.9 TYPE 2 DIABETES MELLITUS WITHOUT COMPLICATION, WITHOUT LONG-TERM CURRENT USE OF INSULIN (HCC): ICD-10-CM

## 2025-07-22 NOTE — TELEPHONE ENCOUNTER
Patient called back. Patient is still having back pain. He stated that he is physically very active and does think that Physical therapy will help. Please refer him to [facility] and let him know when the referral is in place.    Patient also would like to know if next dosage of Mounjaro can be send . He is doing well with current dosage and is ready for the next dosage. He needs it prior to coming Tuesday 7/29. Please call to advise accordingly.    Thank you!!

## 2025-07-22 NOTE — TELEPHONE ENCOUNTER
Patient stated that he is doing great with Mounjaro and is ready for next dosage.  Sujatha review his MRI results and call back accordingly.    Thank you!!

## 2025-07-23 RX ORDER — TIRZEPATIDE 10 MG/.5ML
10 INJECTION, SOLUTION SUBCUTANEOUS WEEKLY
Qty: 2 ML | Refills: 2 | Status: SHIPPED | OUTPATIENT
Start: 2025-07-23

## 2025-07-23 NOTE — TELEPHONE ENCOUNTER
Mounjaro 10 mg weekly was sent to the pharmacy  Referral to [facility] placed  Patient should be scheduled for follow-up in October and proceed with blood work few days prior.  Orders were placed.    Thank you

## 2025-08-01 ENCOUNTER — TELEPHONE (OUTPATIENT)
Age: 60
End: 2025-08-01

## 2025-08-01 DIAGNOSIS — K21.9 CHRONIC GERD: ICD-10-CM

## 2025-08-01 DIAGNOSIS — I12.9 BENIGN HYPERTENSIVE KIDNEY DISEASE WITH CHRONIC KIDNEY DISEASE STAGE I THROUGH STAGE IV, OR UNSPECIFIED: ICD-10-CM

## 2025-08-01 DIAGNOSIS — F41.9 ANXIETY: ICD-10-CM

## 2025-08-02 RX ORDER — PANTOPRAZOLE SODIUM 40 MG/1
40 TABLET, DELAYED RELEASE ORAL
Qty: 90 TABLET | Refills: 1 | Status: SHIPPED | OUTPATIENT
Start: 2025-08-02

## 2025-08-02 RX ORDER — AMLODIPINE BESYLATE 10 MG/1
10 TABLET ORAL EVERY MORNING
Qty: 90 TABLET | Refills: 1 | Status: SHIPPED | OUTPATIENT
Start: 2025-08-02

## 2025-08-02 RX ORDER — ESCITALOPRAM OXALATE 20 MG/1
20 TABLET ORAL DAILY
Qty: 90 TABLET | Refills: 1 | Status: SHIPPED | OUTPATIENT
Start: 2025-08-02

## (undated) DEVICE — PLUMEPEN PRO 10FT

## (undated) DEVICE — GLOVE SRG BIOGEL 8

## (undated) DEVICE — 3M™ TEGADERM™ TRANSPARENT FILM DRESSING FRAME STYLE, 1624W, 2-3/8 IN X 2-3/4 IN (6 CM X 7 CM), 100/CT 4CT/CASE: Brand: 3M™ TEGADERM™

## (undated) DEVICE — CHLORAPREP HI-LITE 26ML ORANGE

## (undated) DEVICE — HYDROPHILIC WOUND DRESSING WITH ZINC PLUS VITAMINS A AND B6.: Brand: DERMAGRAN®-B

## (undated) DEVICE — INTENDED FOR TISSUE SEPARATION, AND OTHER PROCEDURES THAT REQUIRE A SHARP SURGICAL BLADE TO PUNCTURE OR CUT.: Brand: BARD-PARKER SAFETY BLADES SIZE 15, STERILE

## (undated) DEVICE — BETHLEHEM UNIVERSAL MINOR GEN: Brand: CARDINAL HEALTH

## (undated) DEVICE — GLOVE SRG BIOGEL 6.5

## (undated) DEVICE — MEDI-VAC YANKAUER SUCTION HANDLE W/BULBOUS AND CONTROL VENT: Brand: CARDINAL HEALTH

## (undated) DEVICE — TUBING SUCTION 5MM X 12 FT

## (undated) DEVICE — GLOVE INDICATOR PI UNDERGLOVE SZ 8.5 BLUE

## (undated) DEVICE — SUT VICRYL 0 CT-1 36 IN J946H

## (undated) DEVICE — SCD SEQUENTIAL COMPRESSION COMFORT SLEEVE MEDIUM KNEE LENGTH: Brand: KENDALL SCD

## (undated) DEVICE — BINDER ABDOMINAL 46-62 IN

## (undated) DEVICE — GLOVE SRG BIOGEL ECLIPSE 7.5

## (undated) DEVICE — GLOVE INDICATOR PI UNDERGLOVE SZ 6.5 BLUE

## (undated) DEVICE — DRAPE LAPAROTOMY W/POUCHES

## (undated) DEVICE — DRAPE EQUIPMENT RF WAND

## (undated) DEVICE — NEEDLE 25G X 1 1/2

## (undated) DEVICE — SUT PROLENE 2-0 MO-6 30 IN 8417H

## (undated) DEVICE — SUT VICRYL 2-0 SH 27 IN UNDYED J417H

## (undated) DEVICE — 2000CC GUARDIAN II: Brand: GUARDIAN

## (undated) DEVICE — ADHESIVE SKIN HIGH VISCOSITY EXOFIN 1ML

## (undated) DEVICE — SUT VICRYL 3-0 SH 27 IN J416H

## (undated) DEVICE — GLOVE PI ULTRA TOUCH SZ.7.5

## (undated) DEVICE — ELECTRODE BLADE MOD E-Z CLEAN 2.5IN 6.4CM -0012M